# Patient Record
Sex: MALE | Race: ASIAN | NOT HISPANIC OR LATINO | URBAN - METROPOLITAN AREA
[De-identification: names, ages, dates, MRNs, and addresses within clinical notes are randomized per-mention and may not be internally consistent; named-entity substitution may affect disease eponyms.]

---

## 2022-02-19 ENCOUNTER — INPATIENT (INPATIENT)
Facility: HOSPITAL | Age: 72
LOS: 4 days | Discharge: HOME | DRG: 637 | End: 2022-02-24
Attending: INTERNAL MEDICINE | Admitting: STUDENT IN AN ORGANIZED HEALTH CARE EDUCATION/TRAINING PROGRAM
Payer: MEDICARE

## 2022-02-19 VITALS
RESPIRATION RATE: 20 BRPM | HEIGHT: 67 IN | HEART RATE: 91 BPM | SYSTOLIC BLOOD PRESSURE: 155 MMHG | TEMPERATURE: 98 F | DIASTOLIC BLOOD PRESSURE: 96 MMHG | WEIGHT: 145.06 LBS | OXYGEN SATURATION: 99 %

## 2022-02-19 LAB
ALBUMIN SERPL ELPH-MCNC: 4.3 G/DL — SIGNIFICANT CHANGE UP (ref 3.3–5)
ALP SERPL-CCNC: 88 U/L — SIGNIFICANT CHANGE UP (ref 40–120)
ALT FLD-CCNC: 20 U/L — SIGNIFICANT CHANGE UP (ref 10–45)
ANION GAP SERPL CALC-SCNC: 13 MMOL/L — SIGNIFICANT CHANGE UP (ref 5–17)
ANION GAP SERPL CALC-SCNC: 14 MMOL/L — SIGNIFICANT CHANGE UP (ref 5–17)
APPEARANCE UR: CLEAR — SIGNIFICANT CHANGE UP
APTT BLD: 43.1 SEC — HIGH (ref 27.5–35.5)
AST SERPL-CCNC: 18 U/L — SIGNIFICANT CHANGE UP (ref 10–40)
B-OH-BUTYR SERPL-SCNC: 0.8 MMOL/L — HIGH
B-OH-BUTYR SERPL-SCNC: 0.9 MMOL/L — HIGH
BASE EXCESS BLDV CALC-SCNC: 7.2 MMOL/L — HIGH (ref -2–2)
BASOPHILS # BLD AUTO: 0.05 K/UL — SIGNIFICANT CHANGE UP (ref 0–0.2)
BASOPHILS NFR BLD AUTO: 0.6 % — SIGNIFICANT CHANGE UP (ref 0–2)
BILIRUB SERPL-MCNC: 0.9 MG/DL — SIGNIFICANT CHANGE UP (ref 0.2–1.2)
BILIRUB UR-MCNC: NEGATIVE — SIGNIFICANT CHANGE UP
BUN SERPL-MCNC: 14 MG/DL — SIGNIFICANT CHANGE UP (ref 7–23)
BUN SERPL-MCNC: 14 MG/DL — SIGNIFICANT CHANGE UP (ref 7–23)
CA-I SERPL-SCNC: 1.2 MMOL/L — SIGNIFICANT CHANGE UP (ref 1.15–1.33)
CALCIUM SERPL-MCNC: 9.2 MG/DL — SIGNIFICANT CHANGE UP (ref 8.4–10.5)
CALCIUM SERPL-MCNC: 9.9 MG/DL — SIGNIFICANT CHANGE UP (ref 8.4–10.5)
CHLORIDE BLDV-SCNC: 107 MMOL/L — SIGNIFICANT CHANGE UP (ref 96–108)
CHLORIDE SERPL-SCNC: 108 MMOL/L — SIGNIFICANT CHANGE UP (ref 96–108)
CHLORIDE SERPL-SCNC: 110 MMOL/L — HIGH (ref 96–108)
CO2 BLDV-SCNC: 34 MMOL/L — HIGH (ref 22–26)
CO2 SERPL-SCNC: 26 MMOL/L — SIGNIFICANT CHANGE UP (ref 22–31)
CO2 SERPL-SCNC: 27 MMOL/L — SIGNIFICANT CHANGE UP (ref 22–31)
COLOR SPEC: SIGNIFICANT CHANGE UP
CREAT SERPL-MCNC: 0.99 MG/DL — SIGNIFICANT CHANGE UP (ref 0.5–1.3)
CREAT SERPL-MCNC: 1.14 MG/DL — SIGNIFICANT CHANGE UP (ref 0.5–1.3)
DIFF PNL FLD: NEGATIVE — SIGNIFICANT CHANGE UP
EOSINOPHIL # BLD AUTO: 0.06 K/UL — SIGNIFICANT CHANGE UP (ref 0–0.5)
EOSINOPHIL NFR BLD AUTO: 0.8 % — SIGNIFICANT CHANGE UP (ref 0–6)
GAS PNL BLDV: 145 MMOL/L — SIGNIFICANT CHANGE UP (ref 136–145)
GAS PNL BLDV: SIGNIFICANT CHANGE UP
GAS PNL BLDV: SIGNIFICANT CHANGE UP
GLUCOSE BLDV-MCNC: 239 MG/DL — HIGH (ref 70–99)
GLUCOSE SERPL-MCNC: 232 MG/DL — HIGH (ref 70–99)
GLUCOSE SERPL-MCNC: 234 MG/DL — HIGH (ref 70–99)
GLUCOSE UR QL: ABNORMAL
HCO3 BLDV-SCNC: 33 MMOL/L — HIGH (ref 22–29)
HCT VFR BLD CALC: 51.8 % — HIGH (ref 39–50)
HCT VFR BLDA CALC: 51 % — SIGNIFICANT CHANGE UP (ref 39–51)
HGB BLD CALC-MCNC: 16.9 G/DL — SIGNIFICANT CHANGE UP (ref 12.6–17.4)
HGB BLD-MCNC: 16.5 G/DL — SIGNIFICANT CHANGE UP (ref 13–17)
IMM GRANULOCYTES NFR BLD AUTO: 0.4 % — SIGNIFICANT CHANGE UP (ref 0–1.5)
INR BLD: 1.05 RATIO — SIGNIFICANT CHANGE UP (ref 0.88–1.16)
KETONES UR-MCNC: ABNORMAL
LACTATE BLDV-MCNC: 1.6 MMOL/L — SIGNIFICANT CHANGE UP (ref 0.7–2)
LEUKOCYTE ESTERASE UR-ACNC: NEGATIVE — SIGNIFICANT CHANGE UP
LYMPHOCYTES # BLD AUTO: 2.05 K/UL — SIGNIFICANT CHANGE UP (ref 1–3.3)
LYMPHOCYTES # BLD AUTO: 25.9 % — SIGNIFICANT CHANGE UP (ref 13–44)
MAGNESIUM SERPL-MCNC: 2.2 MG/DL — SIGNIFICANT CHANGE UP (ref 1.6–2.6)
MCHC RBC-ENTMCNC: 28.3 PG — SIGNIFICANT CHANGE UP (ref 27–34)
MCHC RBC-ENTMCNC: 31.9 GM/DL — LOW (ref 32–36)
MCV RBC AUTO: 88.7 FL — SIGNIFICANT CHANGE UP (ref 80–100)
MONOCYTES # BLD AUTO: 0.62 K/UL — SIGNIFICANT CHANGE UP (ref 0–0.9)
MONOCYTES NFR BLD AUTO: 7.8 % — SIGNIFICANT CHANGE UP (ref 2–14)
NEUTROPHILS # BLD AUTO: 5.12 K/UL — SIGNIFICANT CHANGE UP (ref 1.8–7.4)
NEUTROPHILS NFR BLD AUTO: 64.5 % — SIGNIFICANT CHANGE UP (ref 43–77)
NITRITE UR-MCNC: NEGATIVE — SIGNIFICANT CHANGE UP
NRBC # BLD: 0 /100 WBCS — SIGNIFICANT CHANGE UP (ref 0–0)
PCO2 BLDV: 47 MMHG — SIGNIFICANT CHANGE UP (ref 42–55)
PH BLDV: 7.45 — HIGH (ref 7.32–7.43)
PH UR: 6 — SIGNIFICANT CHANGE UP (ref 5–8)
PHOSPHATE SERPL-MCNC: 3.2 MG/DL — SIGNIFICANT CHANGE UP (ref 2.5–4.5)
PLATELET # BLD AUTO: 201 K/UL — SIGNIFICANT CHANGE UP (ref 150–400)
PO2 BLDV: 32 MMHG — SIGNIFICANT CHANGE UP (ref 25–45)
POTASSIUM BLDV-SCNC: 3.2 MMOL/L — LOW (ref 3.5–5.1)
POTASSIUM SERPL-MCNC: 2.9 MMOL/L — CRITICAL LOW (ref 3.5–5.3)
POTASSIUM SERPL-MCNC: 3.4 MMOL/L — LOW (ref 3.5–5.3)
POTASSIUM SERPL-SCNC: 2.9 MMOL/L — CRITICAL LOW (ref 3.5–5.3)
POTASSIUM SERPL-SCNC: 3.4 MMOL/L — LOW (ref 3.5–5.3)
PROT SERPL-MCNC: 7.7 G/DL — SIGNIFICANT CHANGE UP (ref 6–8.3)
PROT UR-MCNC: NEGATIVE — SIGNIFICANT CHANGE UP
PROTHROM AB SERPL-ACNC: 12.6 SEC — SIGNIFICANT CHANGE UP (ref 10.6–13.6)
RBC # BLD: 5.84 M/UL — HIGH (ref 4.2–5.8)
RBC # FLD: 12.7 % — SIGNIFICANT CHANGE UP (ref 10.3–14.5)
SAO2 % BLDV: 56.9 % — LOW (ref 67–88)
SODIUM SERPL-SCNC: 149 MMOL/L — HIGH (ref 135–145)
SODIUM SERPL-SCNC: 149 MMOL/L — HIGH (ref 135–145)
SP GR SPEC: 1.01 — SIGNIFICANT CHANGE UP (ref 1.01–1.02)
TROPONIN T, HIGH SENSITIVITY RESULT: 19 NG/L — SIGNIFICANT CHANGE UP (ref 0–51)
TROPONIN T, HIGH SENSITIVITY RESULT: 19 NG/L — SIGNIFICANT CHANGE UP (ref 0–51)
UROBILINOGEN FLD QL: NEGATIVE — SIGNIFICANT CHANGE UP
WBC # BLD: 7.93 K/UL — SIGNIFICANT CHANGE UP (ref 3.8–10.5)
WBC # FLD AUTO: 7.93 K/UL — SIGNIFICANT CHANGE UP (ref 3.8–10.5)

## 2022-02-19 PROCEDURE — 93010 ELECTROCARDIOGRAM REPORT: CPT

## 2022-02-19 PROCEDURE — 99285 EMERGENCY DEPT VISIT HI MDM: CPT

## 2022-02-19 PROCEDURE — 71045 X-RAY EXAM CHEST 1 VIEW: CPT | Mod: 26

## 2022-02-19 RX ORDER — SODIUM CHLORIDE 9 MG/ML
1000 INJECTION, SOLUTION INTRAVENOUS ONCE
Refills: 0 | Status: COMPLETED | OUTPATIENT
Start: 2022-02-19 | End: 2022-02-19

## 2022-02-19 RX ADMIN — SODIUM CHLORIDE 1000 MILLILITER(S): 9 INJECTION, SOLUTION INTRAVENOUS at 21:18

## 2022-02-19 NOTE — ED PROVIDER NOTE - ATTENDING CONTRIBUTION TO CARE
I performed a history and physical exam of the patient and discussed their management with the resident and /or advanced care provider. I reviewed the resident and /or ACP's note and agree with the documented findings and plan of care except where otherwise noted. My medical decision making and observations are found below     72 yo M no PMH with 1 week lethargy, weakness, fatugue, polyuria, polydipsia. Went to PMD who diagnosed with htn and DM. Rx'd him with medication which he verdugo snot know name of and has not started yet. Symptoms worsened today so sent here.  . No abdominal pain, no fever, no chest pain, no sob. Endorses chills, transient blurred vision yesterday and chronic b/l LE paresthesias. COVID vaccinated    ddx includes but not limited to new onset DM, less likley HHS vs DKA. will eval for underlying ischemic. infectious or metabolic etiology, although low concern based on history. Labs, xray, UA, IVF to help decrease sugar and reassess for dispo I performed a history and physical exam of the patient and discussed their management with the resident and /or advanced care provider. I reviewed the resident and /or ACP's note and agree with the documented findings and plan of care except where otherwise noted. My medical decision making and observations are found below     72 yo M no PMH with 1 week lethargy, weakness, fatugue, polyuria, polydipsia. Went to PMD who diagnosed with htn and DM. Rx'd him with medication which he verdugo snot know name of and has not started yet. Symptoms worsened today so sent here.  . No abdominal pain, no fever, no chest pain, no sob. Endorses chills, transient blurred vision yesterday and chronic b/l LE paresthesias. COVID vaccinated    Exam nonfocal. hemodynamically stable. awake, alert, ocnversant. respirations nonlabored. abdomen soft, nontender    ddx includes but not limited to new onset DM, less likley HHS vs DKA. will eval for underlying ischemic. infectious or metabolic etiology, although low concern based on history. Labs, xray, UA, IVF to help decrease sugar and reassess for dispo    EKG: NSR 64, otherwise normal intervals, no overt ischemic changes Home

## 2022-02-19 NOTE — ED PROVIDER NOTE - WR ORDER ID 1
[id number] Star Wedge Flap Text: The defect edges were debeveled with a #15 scalpel blade.  Given the location of the defect, shape of the defect and the proximity to free margins a star wedge flap was deemed most appropriate.  Using a sterile surgical marker, an appropriate rotation flap was drawn incorporating the defect and placing the expected incisions within the relaxed skin tension lines where possible. The area thus outlined was incised deep to adipose tissue with a #15 scalpel blade.  The skin margins were undermined to an appropriate distance in all directions utilizing iris scissors.

## 2022-02-19 NOTE — ED ADULT NURSE NOTE - OBJECTIVE STATEMENT
70 yo male presents to ED c/o lethargy, weakness, polyuria, and polydipsia x 1 week. Pt reports he went to PMD regarding symptoms and was told he has DM and HTN, but reports no medication started yet. Pt also endorsing b/l "shocking tingles" to LE, chills, transient blurred vision, and decreased "taste." Pt reports symptoms worsened today so he came for further evaluation. Pt denies cp, sob, palpitations, h/a, dizziness, lightheadedness, difficulty ambulating, abdominal pain, n/v/d, burning/pain with urination, fevers. Upon assessment, pt a&ox3, nad, breathing spontaneous and nonlabored, able to move all extremities and follow commands, skin warm and appropriate color, neuro intact. Pt NSR on CM. MD at bedside. Pt safety and comfort provided.

## 2022-02-19 NOTE — ED PROVIDER NOTE - CLINICAL SUMMARY MEDICAL DECISION MAKING FREE TEXT BOX
72 yo M new dx of DM has not taken meds p/w 3 days of polyuria polydipsia weakness and fatigue. VSS.  at triage. a+o x3. no abd pain. Concern for hyperglycemia. lower concern for hhs dka infection. Will get labs ua coags vbg trop bhb ekg covid give ivf and r/a

## 2022-02-19 NOTE — ED PROVIDER NOTE - CARE PLAN
Principal Discharge DX:	Hypokalemia   1 Principal Discharge DX:	Hypokalemia  Secondary Diagnosis:	Hyperglycemia due to diabetes mellitus

## 2022-02-19 NOTE — ED PROVIDER NOTE - OBJECTIVE STATEMENT
70 yo M no PMH with 1 week lethargy, weakness, fatigue, polyuria, polydipsia. Went to PMD who diagnosed with htn and DM. Rx'd him with medication which  not started yet. Symptoms worsened today so sent here.  . No abdominal pain, no fever, no chest pain, no sob no dysuria. Endorses chills, transient blurred vision yesterday and chronic b/l LE paresthesias. COVID vaccinated no sick contacts. spoke to daughter who corroborated story.

## 2022-02-20 DIAGNOSIS — E78.5 HYPERLIPIDEMIA, UNSPECIFIED: ICD-10-CM

## 2022-02-20 DIAGNOSIS — E87.6 HYPOKALEMIA: ICD-10-CM

## 2022-02-20 DIAGNOSIS — E11.9 TYPE 2 DIABETES MELLITUS WITHOUT COMPLICATIONS: ICD-10-CM

## 2022-02-20 DIAGNOSIS — I10 ESSENTIAL (PRIMARY) HYPERTENSION: ICD-10-CM

## 2022-02-20 LAB
ANION GAP SERPL CALC-SCNC: 15 MMOL/L — SIGNIFICANT CHANGE UP (ref 5–17)
ANION GAP SERPL CALC-SCNC: 15 MMOL/L — SIGNIFICANT CHANGE UP (ref 5–17)
BUN SERPL-MCNC: 14 MG/DL — SIGNIFICANT CHANGE UP (ref 7–23)
BUN SERPL-MCNC: 14 MG/DL — SIGNIFICANT CHANGE UP (ref 7–23)
CALCIUM SERPL-MCNC: 9.1 MG/DL — SIGNIFICANT CHANGE UP (ref 8.4–10.5)
CALCIUM SERPL-MCNC: 9.3 MG/DL — SIGNIFICANT CHANGE UP (ref 8.4–10.5)
CHLORIDE SERPL-SCNC: 109 MMOL/L — HIGH (ref 96–108)
CHLORIDE SERPL-SCNC: 113 MMOL/L — HIGH (ref 96–108)
CO2 SERPL-SCNC: 23 MMOL/L — SIGNIFICANT CHANGE UP (ref 22–31)
CO2 SERPL-SCNC: 26 MMOL/L — SIGNIFICANT CHANGE UP (ref 22–31)
CREAT SERPL-MCNC: 1.01 MG/DL — SIGNIFICANT CHANGE UP (ref 0.5–1.3)
CREAT SERPL-MCNC: 1.07 MG/DL — SIGNIFICANT CHANGE UP (ref 0.5–1.3)
GLUCOSE BLDC GLUCOMTR-MCNC: 141 MG/DL — HIGH (ref 70–99)
GLUCOSE BLDC GLUCOMTR-MCNC: 200 MG/DL — HIGH (ref 70–99)
GLUCOSE BLDC GLUCOMTR-MCNC: 240 MG/DL — HIGH (ref 70–99)
GLUCOSE SERPL-MCNC: 222 MG/DL — HIGH (ref 70–99)
GLUCOSE SERPL-MCNC: 236 MG/DL — HIGH (ref 70–99)
HCT VFR BLD CALC: 46.7 % — SIGNIFICANT CHANGE UP (ref 39–50)
HGB BLD-MCNC: 15.3 G/DL — SIGNIFICANT CHANGE UP (ref 13–17)
MAGNESIUM SERPL-MCNC: 2.1 MG/DL — SIGNIFICANT CHANGE UP (ref 1.6–2.6)
MCHC RBC-ENTMCNC: 28.8 PG — SIGNIFICANT CHANGE UP (ref 27–34)
MCHC RBC-ENTMCNC: 32.8 GM/DL — SIGNIFICANT CHANGE UP (ref 32–36)
MCV RBC AUTO: 87.9 FL — SIGNIFICANT CHANGE UP (ref 80–100)
NRBC # BLD: 0 /100 WBCS — SIGNIFICANT CHANGE UP (ref 0–0)
PHOSPHATE SERPL-MCNC: 2.8 MG/DL — SIGNIFICANT CHANGE UP (ref 2.5–4.5)
PLATELET # BLD AUTO: 183 K/UL — SIGNIFICANT CHANGE UP (ref 150–400)
POTASSIUM SERPL-MCNC: 3 MMOL/L — LOW (ref 3.5–5.3)
POTASSIUM SERPL-MCNC: 3.6 MMOL/L — SIGNIFICANT CHANGE UP (ref 3.5–5.3)
POTASSIUM SERPL-SCNC: 3 MMOL/L — LOW (ref 3.5–5.3)
POTASSIUM SERPL-SCNC: 3.6 MMOL/L — SIGNIFICANT CHANGE UP (ref 3.5–5.3)
RBC # BLD: 5.31 M/UL — SIGNIFICANT CHANGE UP (ref 4.2–5.8)
RBC # FLD: 12.6 % — SIGNIFICANT CHANGE UP (ref 10.3–14.5)
SARS-COV-2 RNA SPEC QL NAA+PROBE: SIGNIFICANT CHANGE UP
SODIUM SERPL-SCNC: 150 MMOL/L — HIGH (ref 135–145)
SODIUM SERPL-SCNC: 151 MMOL/L — HIGH (ref 135–145)
WBC # BLD: 7.84 K/UL — SIGNIFICANT CHANGE UP (ref 3.8–10.5)
WBC # FLD AUTO: 7.84 K/UL — SIGNIFICANT CHANGE UP (ref 3.8–10.5)

## 2022-02-20 RX ORDER — HYDRALAZINE HCL 50 MG
5 TABLET ORAL ONCE
Refills: 0 | Status: COMPLETED | OUTPATIENT
Start: 2022-02-20 | End: 2022-02-20

## 2022-02-20 RX ORDER — DEXTROSE 50 % IN WATER 50 %
15 SYRINGE (ML) INTRAVENOUS ONCE
Refills: 0 | Status: DISCONTINUED | OUTPATIENT
Start: 2022-02-20 | End: 2022-02-24

## 2022-02-20 RX ORDER — ASPIRIN/CALCIUM CARB/MAGNESIUM 324 MG
81 TABLET ORAL DAILY
Refills: 0 | Status: DISCONTINUED | OUTPATIENT
Start: 2022-02-20 | End: 2022-02-24

## 2022-02-20 RX ORDER — DEXTROSE 50 % IN WATER 50 %
12.5 SYRINGE (ML) INTRAVENOUS ONCE
Refills: 0 | Status: DISCONTINUED | OUTPATIENT
Start: 2022-02-20 | End: 2022-02-24

## 2022-02-20 RX ORDER — SODIUM CHLORIDE 9 MG/ML
1000 INJECTION, SOLUTION INTRAVENOUS
Refills: 0 | Status: DISCONTINUED | OUTPATIENT
Start: 2022-02-20 | End: 2022-02-20

## 2022-02-20 RX ORDER — DEXTROSE 50 % IN WATER 50 %
25 SYRINGE (ML) INTRAVENOUS ONCE
Refills: 0 | Status: DISCONTINUED | OUTPATIENT
Start: 2022-02-20 | End: 2022-02-24

## 2022-02-20 RX ORDER — INSULIN LISPRO 100/ML
4 VIAL (ML) SUBCUTANEOUS
Refills: 0 | Status: DISCONTINUED | OUTPATIENT
Start: 2022-02-20 | End: 2022-02-24

## 2022-02-20 RX ORDER — INSULIN GLARGINE 100 [IU]/ML
14 INJECTION, SOLUTION SUBCUTANEOUS AT BEDTIME
Refills: 0 | Status: DISCONTINUED | OUTPATIENT
Start: 2022-02-20 | End: 2022-02-21

## 2022-02-20 RX ORDER — LOSARTAN POTASSIUM 100 MG/1
25 TABLET, FILM COATED ORAL EVERY 12 HOURS
Refills: 0 | Status: DISCONTINUED | OUTPATIENT
Start: 2022-02-20 | End: 2022-02-21

## 2022-02-20 RX ORDER — HEPARIN SODIUM 5000 [USP'U]/ML
5000 INJECTION INTRAVENOUS; SUBCUTANEOUS EVERY 12 HOURS
Refills: 0 | Status: DISCONTINUED | OUTPATIENT
Start: 2022-02-20 | End: 2022-02-24

## 2022-02-20 RX ORDER — POTASSIUM CHLORIDE 20 MEQ
40 PACKET (EA) ORAL ONCE
Refills: 0 | Status: COMPLETED | OUTPATIENT
Start: 2022-02-20 | End: 2022-02-20

## 2022-02-20 RX ORDER — ATORVASTATIN CALCIUM 80 MG/1
10 TABLET, FILM COATED ORAL AT BEDTIME
Refills: 0 | Status: DISCONTINUED | OUTPATIENT
Start: 2022-02-20 | End: 2022-02-24

## 2022-02-20 RX ORDER — INSULIN LISPRO 100/ML
VIAL (ML) SUBCUTANEOUS
Refills: 0 | Status: DISCONTINUED | OUTPATIENT
Start: 2022-02-20 | End: 2022-02-24

## 2022-02-20 RX ORDER — HYDRALAZINE HCL 50 MG
25 TABLET ORAL ONCE
Refills: 0 | Status: COMPLETED | OUTPATIENT
Start: 2022-02-20 | End: 2022-02-20

## 2022-02-20 RX ORDER — SODIUM CHLORIDE 9 MG/ML
1000 INJECTION, SOLUTION INTRAVENOUS
Refills: 0 | Status: DISCONTINUED | OUTPATIENT
Start: 2022-02-20 | End: 2022-02-24

## 2022-02-20 RX ORDER — GLUCAGON INJECTION, SOLUTION 0.5 MG/.1ML
1 INJECTION, SOLUTION SUBCUTANEOUS ONCE
Refills: 0 | Status: DISCONTINUED | OUTPATIENT
Start: 2022-02-20 | End: 2022-02-24

## 2022-02-20 RX ORDER — INSULIN LISPRO 100/ML
VIAL (ML) SUBCUTANEOUS AT BEDTIME
Refills: 0 | Status: DISCONTINUED | OUTPATIENT
Start: 2022-02-20 | End: 2022-02-24

## 2022-02-20 RX ORDER — POTASSIUM CHLORIDE 20 MEQ
10 PACKET (EA) ORAL
Refills: 0 | Status: COMPLETED | OUTPATIENT
Start: 2022-02-20 | End: 2022-02-20

## 2022-02-20 RX ORDER — INSULIN GLARGINE 100 [IU]/ML
8 INJECTION, SOLUTION SUBCUTANEOUS AT BEDTIME
Refills: 0 | Status: DISCONTINUED | OUTPATIENT
Start: 2022-02-20 | End: 2022-02-20

## 2022-02-20 RX ORDER — SODIUM CHLORIDE 9 MG/ML
1000 INJECTION, SOLUTION INTRAVENOUS
Refills: 0 | Status: DISCONTINUED | OUTPATIENT
Start: 2022-02-20 | End: 2022-02-23

## 2022-02-20 RX ADMIN — Medication 100 MILLIEQUIVALENT(S): at 03:02

## 2022-02-20 RX ADMIN — Medication 25 MILLIGRAM(S): at 09:11

## 2022-02-20 RX ADMIN — Medication 100 MILLIEQUIVALENT(S): at 01:55

## 2022-02-20 RX ADMIN — SODIUM CHLORIDE 70 MILLILITER(S): 9 INJECTION, SOLUTION INTRAVENOUS at 13:20

## 2022-02-20 RX ADMIN — Medication 100 MILLIEQUIVALENT(S): at 04:14

## 2022-02-20 RX ADMIN — ATORVASTATIN CALCIUM 10 MILLIGRAM(S): 80 TABLET, FILM COATED ORAL at 21:47

## 2022-02-20 RX ADMIN — Medication 2: at 10:23

## 2022-02-20 RX ADMIN — Medication 4 UNIT(S): at 17:13

## 2022-02-20 RX ADMIN — SODIUM CHLORIDE 100 MILLILITER(S): 9 INJECTION, SOLUTION INTRAVENOUS at 06:19

## 2022-02-20 RX ADMIN — Medication 1: at 17:13

## 2022-02-20 RX ADMIN — INSULIN GLARGINE 14 UNIT(S): 100 INJECTION, SOLUTION SUBCUTANEOUS at 21:48

## 2022-02-20 RX ADMIN — LOSARTAN POTASSIUM 25 MILLIGRAM(S): 100 TABLET, FILM COATED ORAL at 17:12

## 2022-02-20 RX ADMIN — Medication 81 MILLIGRAM(S): at 13:21

## 2022-02-20 RX ADMIN — HEPARIN SODIUM 5000 UNIT(S): 5000 INJECTION INTRAVENOUS; SUBCUTANEOUS at 17:14

## 2022-02-20 RX ADMIN — Medication 40 MILLIEQUIVALENT(S): at 01:55

## 2022-02-20 RX ADMIN — Medication 5 MILLIGRAM(S): at 15:14

## 2022-02-20 NOTE — H&P ADULT - NSHPPHYSICALEXAM_GEN_ALL_CORE
PHYSICAL EXAMINATION:  Vital Signs Last 24 Hrs  T(C): 36.6 (20 Feb 2022 08:53), Max: 36.7 (19 Feb 2022 16:43)  T(F): 97.8 (20 Feb 2022 08:53), Max: 98.1 (19 Feb 2022 16:43)  HR: 63 (20 Feb 2022 09:46) (61 - 91)  BP: 155/84 (20 Feb 2022 09:46) (150/88 - 171/94)  BP(mean): 112 (20 Feb 2022 08:53) (112 - 112)  RR: 18 (20 Feb 2022 08:53) (14 - 20)  SpO2: 100% (20 Feb 2022 08:53) (98% - 100%)  CAPILLARY BLOOD GLUCOSE      POCT Blood Glucose.: 240 mg/dL (20 Feb 2022 10:17)  POCT Blood Glucose.: 226 mg/dL (19 Feb 2022 16:44)        GENERAL: NAD, well-groomed,  HEAD:  atraumatic, normocephalic  EYES: sclera anicteric  ENMT: mucous membranes moist  NECK: supple, No JVD  CHEST/LUNG: clear to auscultation bilaterally;    no      rales   ,   no rhonchi,   HEART: normal S1, S2  ABDOMEN: BS+, soft, ND, NT   EXTREMITIES:    no    edema    b/l LEs  NEURO: awake, ,     moves all extremities  SKIN: no     rash

## 2022-02-20 NOTE — H&P ADULT - ASSESSMENT
70 yo M   no PMH with 1 week lethargy, weakness, fatigue, polyuria, polydipsia.   Went to PMD who diagnosed with htn and DM. Rx'd him with medication which  not started yet.   Symptoms worsened today so sent here.  .   No abdominal pain, no fever, no chest pain, no sob no dysuria.   Endorses chills, transient blurred vision yesterday and chronic b/l LE paresthesias.   COVID vaccinated no sick contacts.      pt with weakness,  polyuria  DM,  follow  fs  on lantus/    endo d r hkan   on iv fluids on  dvt ppx  echo pending             Go for blood tests as directed. Your doctor will do lab tests at regular visits to monitor the effects of this medicine. Please follow up with your doctor and keep your health care provider appointments.

## 2022-02-20 NOTE — CONSULT NOTE ADULT - SUBJECTIVE AND OBJECTIVE BOX
CHIEF COMPLAINT:Patient is a 71y old  Male who presents with a chief complaint of weakness (20 Feb 2022 12:14)      HPI:  72 yo M no PMH with 1 week lethargy, weakness, fatigue, polyuria, polydipsia.Went to PMD who diagnosed with htn and DM. Rx'd him with medication which  not started yet.   Symptoms worsened today so sent here.  .   No abdominal pain, no fever, no chest pain, no sob no dysuria. Endorses chills, transient blurred vision yesterday and chronic b/l LE paresthesias.         PAST MEDICAL & SURGICAL HISTORY:      MEDICATIONS  (STANDING):  aspirin enteric coated 81 milliGRAM(s) Oral daily  dextrose 40% Gel 15 Gram(s) Oral once  dextrose 5% + sodium chloride 0.45%. 1000 milliLiter(s) (70 mL/Hr) IV Continuous <Continuous>  dextrose 5%. 1000 milliLiter(s) (100 mL/Hr) IV Continuous <Continuous>  dextrose 5%. 1000 milliLiter(s) (50 mL/Hr) IV Continuous <Continuous>  dextrose 50% Injectable 25 Gram(s) IV Push once  dextrose 50% Injectable 12.5 Gram(s) IV Push once  dextrose 50% Injectable 25 Gram(s) IV Push once  glucagon  Injectable 1 milliGRAM(s) IntraMuscular once  heparin   Injectable 5000 Unit(s) SubCutaneous every 12 hours  insulin glargine Injectable (LANTUS) 8 Unit(s) SubCutaneous at bedtime  insulin lispro (ADMELOG) corrective regimen sliding scale   SubCutaneous three times a day before meals  insulin lispro (ADMELOG) corrective regimen sliding scale   SubCutaneous at bedtime    MEDICATIONS  (PRN):      FAMILY HISTORY:      SOCIAL HISTORY:    [x ] Non-smoker  [ ] Smoker  [ ] Alcohol    Allergies    No Known Allergies    Intolerances    	    REVIEW OF SYSTEMS:  CONSTITUTIONAL: No fever, weight loss, or fatigue  EYES: No eye pain, visual disturbances, or discharge, +blurry vision  ENT:  No difficulty hearing, tinnitus, vertigo; No sinus or throat pain  NECK: No pain or stiffness  RESPIRATORY: No cough, wheezing, chills or hemoptysis; No Shortness of Breath  CARDIOVASCULAR: No chest pain, palpitations, passing out, dizziness, or leg swelling  GASTROINTESTINAL: No abdominal or epigastric pain. No nausea, vomiting, or hematemesis; No diarrhea or constipation. No melena or hematochezia.  GENITOURINARY: No dysuria, frequency, hematuria, or incontinence  NEUROLOGICAL: No headaches, memory loss, loss of strength, numbness, or tremors  SKIN: No itching, burning, rashes, or lesions   LYMPH Nodes: No enlarged glands  ENDOCRINE: No heat or cold intolerance; No hair loss  MUSCULOSKELETAL: No joint pain or swelling; No muscle, back, or extremity pain  PSYCHIATRIC: No depression, anxiety, mood swings, or difficulty sleeping  HEME/LYMPH: No easy bruising, or bleeding gums  ALLERGY AND IMMUNOLOGIC: No hives or eczema	    [ ] All others negative	  [ ] Unable to obtain    PHYSICAL EXAM:  T(C): 36.6 (02-20-22 @ 08:53), Max: 36.7 (02-19-22 @ 16:43)  HR: 63 (02-20-22 @ 09:46) (61 - 91)  BP: 155/84 (02-20-22 @ 09:46) (150/88 - 171/94)  RR: 18 (02-20-22 @ 08:53) (14 - 20)  SpO2: 100% (02-20-22 @ 08:53) (98% - 100%)  Wt(kg): --  I&O's Summary      Appearance: Normal	  HEENT:   Normal oral mucosa, PERRL, EOMI	  Lymphatic: No lymphadenopathy  Cardiovascular: Normal S1 S2, No JVD, + murmurs, No edema  Respiratory: Lungs clear to auscultation	  Psychiatry: A & O x 3, Mood & affect appropriate  Gastrointestinal:  Soft, Non-tender, + BS	  Skin: No rashes, No ecchymoses, No cyanosis	  Neurologic: Non-focal  Extremities: Normal range of motion, No clubbing, cyanosis or edema  Vascular: Peripheral pulses palpable 2+ bilaterally    TELEMETRY: 	    ECG:  	  RADIOLOGY:  OTHER: 	  	  LABS:	 	    CARDIAC MARKERS:                              15.3   7.84  )-----------( 183      ( 20 Feb 2022 05:11 )             46.7     02-20    151<H>  |  113<H>  |  14  ----------------------------<  222<H>  3.6   |  23  |  1.07    Ca    9.1      20 Feb 2022 05:11  Phos  2.8     02-20  Mg     2.1     02-20    TPro  7.7  /  Alb  4.3  /  TBili  0.9  /  DBili  x   /  AST  18  /  ALT  20  /  AlkPhos  88  02-19    proBNP:   Lipid Profile:   HgA1c:   TSH:   PT/INR - ( 19 Feb 2022 20:44 )   PT: 12.6 sec;   INR: 1.05 ratio         PTT - ( 19 Feb 2022 20:44 )  PTT:43.1 sec    PREVIOUS DIAGNOSTIC TESTING:    < from: Xray Chest 1 View AP/PA (02.19.22 @ 21:47) >  The lungs are clear.  No pleural effusion or pneumothorax.  Heart size is within normal limits.  No acute abnormality within visible osseous structures.      IMPRESSION:    Clear lungs.    Troponin T, High Sensitivity (02.19.22 @ 21:40)    Troponin T, High Sensitivity Result: 19: Specimen not hemolyzed  *  *  Rapid upward or downward changes in high-sensitivity troponin levels  suggest acute myocardial injury. Renal impairment may cause sustained  troponin elevations.  Normal: <6 - 14 ng/L  Indeterminate: 15-51 ng/L  Elevated: > 51 ng/L  See [email or web address]/[email or web address]/[email or web address] on the [facility] for more  information ng/L    Troponin T, High Sensitivity (02.19.22 @ 20:44)    Troponin T, High Sensitivity Result: 19: Specimen not hemolyzed  *  *  Rapid upward or downward changes in high-sensitivity troponin levels  suggest acute myocardial injury. Renal impairment may cause sustained  troponin elevations.  Normal: <6 - 14 ng/L  Indeterminate: 15-51 ng/L  Elevated: > 51 ng/L  See [email or web address] on the [facility] [place] for more  information ng/L            
      HPI:  72 yo M   no PMH with 1 week lethargy, weakness, fatigue, polyuria, polydipsia.   Went to PMD who diagnosed with htn and DM. Rx'd him with medication which  not started yet.   Symptoms worsened today so sent here.  .   No abdominal pain, no fever, no chest pain, no sob no dysuria. Endorses chills, transient blurred vision yesterday and chronic b/l LE paresthesias.   COVID vaccinated no sick contacts. (20 Feb 2022 12:14)    Patient with newly diagnosed diabetes, A1C pending, was not taking any hypoglycemic agents, had polyuria polydipsia. Patient follows up with PCP for health management.  Endo was consulted for glycemic control.    PAST MEDICAL & SURGICAL HISTORY:      FAMILY HISTORY:      Social History:    Outpatient Medications:    MEDICATIONS  (STANDING):  aspirin enteric coated 81 milliGRAM(s) Oral daily  atorvastatin 10 milliGRAM(s) Oral at bedtime  dextrose 40% Gel 15 Gram(s) Oral once  dextrose 5% + sodium chloride 0.45%. 1000 milliLiter(s) (70 mL/Hr) IV Continuous <Continuous>  dextrose 5%. 1000 milliLiter(s) (100 mL/Hr) IV Continuous <Continuous>  dextrose 5%. 1000 milliLiter(s) (50 mL/Hr) IV Continuous <Continuous>  dextrose 50% Injectable 25 Gram(s) IV Push once  dextrose 50% Injectable 12.5 Gram(s) IV Push once  dextrose 50% Injectable 25 Gram(s) IV Push once  glucagon  Injectable 1 milliGRAM(s) IntraMuscular once  heparin   Injectable 5000 Unit(s) SubCutaneous every 12 hours  insulin glargine Injectable (LANTUS) 8 Unit(s) SubCutaneous at bedtime  insulin lispro (ADMELOG) corrective regimen sliding scale   SubCutaneous three times a day before meals  insulin lispro (ADMELOG) corrective regimen sliding scale   SubCutaneous at bedtime  losartan 25 milliGRAM(s) Oral every 12 hours    MEDICATIONS  (PRN):      Allergies    No Known Allergies    Intolerances      Review of Systems:  Constitutional: No fever, no chills  Eyes: No blurry vision  Neuro: No tremors  HEENT: No pain, no neck swelling  Cardiovascular: No chest pain, no palpitations  Respiratory: Has SOB, no cough  GI: No nausea, vomiting, abdominal pain  : No dysuria  Skin: no rash  MSK: Has leg swelling.  Psych: no depression  Endocrine: no polyuria, polydipsia    ALL OTHER SYSTEMS REVIEWED AND NEGATIVE    UNABLE TO OBTAIN    PHYSICAL EXAM:  VITALS: T(C): 36.8 (02-20-22 @ 15:46)  T(F): 98.2 (02-20-22 @ 15:46), Max: 98.3 (02-20-22 @ 15:07)  HR: 78 (02-20-22 @ 15:46) (61 - 91)  BP: 169/87 (02-20-22 @ 15:46) (150/88 - 175/83)  RR:  (14 - 20)  SpO2:  (98% - 100%)  Wt(kg): --  GENERAL: NAD, well-groomed, well-developed  EYES: No proptosis, no lid lag  HEENT:  Atraumatic, Normocephalic  THYROID: Normal size, no palpable nodules  RESPIRATORY: Clear to auscultation bilaterally; No rales, rhonchi, wheezing  CARDIOVASCULAR: Si S2, No murmurs;  GI: Soft, non distended, normal bowel sounds  SKIN: Dry, intact, No rashes or lesions  MUSCULOSKELETAL: Has BL lower extremity edema.  NEURO:  no tremor, sensation decreased in feet BL,    POCT Blood Glucose.: 240 mg/dL (02-20-22 @ 10:17)  POCT Blood Glucose.: 226 mg/dL (02-19-22 @ 16:44)                            15.3   7.84  )-----------( 183      ( 20 Feb 2022 05:11 )             46.7       02-20    151<H>  |  113<H>  |  14  ----------------------------<  222<H>  3.6   |  23  |  1.07    EGFR if : 80  EGFR if non : 69    Ca    9.1      02-20  Mg     2.1     02-20  Phos  2.8     02-20    TPro  7.7  /  Alb  4.3  /  TBili  0.9  /  DBili  x   /  AST  18  /  ALT  20  /  AlkPhos  88  02-19      Thyroid Function Tests:              Radiology:

## 2022-02-20 NOTE — PATIENT PROFILE ADULT - FALL HARM RISK - FALLEN IN PAST
Pt attempted to contact his spouse 3 time while I was in the  Room with no success     Ludwig Hardin  02/10/20 1621     No

## 2022-02-20 NOTE — PATIENT PROFILE ADULT - FALL HARM RISK - HARM RISK INTERVENTIONS

## 2022-02-20 NOTE — PATIENT PROFILE ADULT - INTERPRETATION SERVICES DECLINED
Pt states he feels comfortable communicating in english at this time but when discussing medical information would like to use /Patient/Caregiver requests family/friend to interpret.

## 2022-02-20 NOTE — H&P ADULT - HISTORY OF PRESENT ILLNESS
72 yo M   no PMH with 1 week lethargy, weakness, fatigue, polyuria, polydipsia.   Went to PMD who diagnosed with htn and DM. Rx'd him with medication which  not started yet.   Symptoms worsened today so sent here.  .   No abdominal pain, no fever, no chest pain, no sob no dysuria. Endorses chills, transient blurred vision yesterday and chronic b/l LE paresthesias.   COVID vaccinated no sick contacts.

## 2022-02-20 NOTE — CONSULT NOTE ADULT - ASSESSMENT
Assessment  DMT2: 71y Male with newly dx diabetes with hyperglycemia, A1C pending, was not taking any hypoglycemic agents, admitted with lethargy/weakness/hyperglycemia and elevated bhb, started on basal insulin and coverage (first dose Lantus tonight), blood sugars running high and not at target, no hypoglycemic episodes, eating meals, NAD.  HTN: Controlled,  on antihypertensive medications.  HLD: On statin        Nini Carpenter MD  Cell: [phone]  Office: [phone]     Assessment  DMT2: 71y Male with newly dx diabetes with hyperglycemia, A1C pending, was not taking any hypoglycemic agents, admitted with lethargy/weakness/hyperglycemia and elevated bhb, started on basal insulin and coverage (first dose Lantus tonight), blood sugars running high and not at target, no hypoglycemic episodes, eating meals, NAD.  HTN: Controlled,  on antihypertensive medications.  HLD: On statin        feng rowley MD  [phone]

## 2022-02-20 NOTE — CONSULT NOTE ADULT - ASSESSMENT
72 yo M no PMH with 1 week lethargy, weakness, fatigue, polyuria, polydipsia.Went to PMD who diagnosed with htn and DM. Rx'd him with medication which  not started yet.   Symptoms worsened today so sent here.  .   No abdominal pain, no fever, no chest pain, no sob no dysuria. Endorses chills, transient blurred vision yesterday and chronic b/l LE paresthesias.   pt with new onset of DM with trop of 19, ?cause of sudden increase blood sugar  check cultures  fu cardiac enzymes  awaiting ecg  agree with echo  BL parasthenia ,cs spine x ray  hypernatremia, hydration  check lipid panel  increase bp/ dm add ace or ARB

## 2022-02-20 NOTE — CONSULT NOTE ADULT - PROBLEM SELECTOR RECOMMENDATION 9
Suggest to continue IV hydration as tolerated.  Will increase Lantus to 14u at bedtime.  Will start Admelog 4u before each meal and continue Admelog correction scale coverage. Will continue monitoring FS and FU. Will FU DC recommendations.  Patient counseled for compliance with consistent low carb diet and exercise as tolerated outpatient.

## 2022-02-20 NOTE — H&P ADULT - NSHPLABSRESULTS_GEN_ALL_CORE
LABS:                        15.3   7.84  )-----------( 183      ( 2022 05:11 )             46.7     -    151<H>  |  113<H>  |  14  ----------------------------<  222<H>  3.6   |  23  |  1.07    Ca    9.1      2022 05:11  Phos  2.8       Mg     2.1         TPro  7.7  /  Alb  4.3  /  TBili  0.9  /  DBili  x   /  AST  18  /  ALT  20  /  AlkPhos  88      PT/INR - ( 2022 20:44 )   PT: 12.6 sec;   INR: 1.05 ratio         PTT - ( 2022 20:44 )  PTT:43.1 sec      Urinalysis Basic - ( 2022 21:40 )    Color: Light Yellow / Appearance: Clear / S.011 / pH: x  Gluc: x / Ketone: Small  / Bili: Negative / Urobili: Negative   Blood: x / Protein: Negative / Nitrite: Negative   Leuk Esterase: Negative / RBC: x / WBC x   Sq Epi: x / Non Sq Epi: x / Bacteria: x           @ 20:43  3.2  32

## 2022-02-21 LAB
A1C WITH ESTIMATED AVERAGE GLUCOSE RESULT: 11.5 % — HIGH (ref 4–5.6)
ANION GAP SERPL CALC-SCNC: 15 MMOL/L — SIGNIFICANT CHANGE UP (ref 5–17)
BUN SERPL-MCNC: 13 MG/DL — SIGNIFICANT CHANGE UP (ref 7–23)
CALCIUM SERPL-MCNC: 9.5 MG/DL — SIGNIFICANT CHANGE UP (ref 8.4–10.5)
CHLORIDE SERPL-SCNC: 110 MMOL/L — HIGH (ref 96–108)
CHOLEST SERPL-MCNC: 217 MG/DL — HIGH
CO2 SERPL-SCNC: 25 MMOL/L — SIGNIFICANT CHANGE UP (ref 22–31)
CREAT SERPL-MCNC: 0.95 MG/DL — SIGNIFICANT CHANGE UP (ref 0.5–1.3)
ESTIMATED AVERAGE GLUCOSE: 283 MG/DL — HIGH (ref 68–114)
GLUCOSE BLDC GLUCOMTR-MCNC: 108 MG/DL — HIGH (ref 70–99)
GLUCOSE BLDC GLUCOMTR-MCNC: 130 MG/DL — HIGH (ref 70–99)
GLUCOSE BLDC GLUCOMTR-MCNC: 190 MG/DL — HIGH (ref 70–99)
GLUCOSE BLDC GLUCOMTR-MCNC: 193 MG/DL — HIGH (ref 70–99)
GLUCOSE SERPL-MCNC: 209 MG/DL — HIGH (ref 70–99)
HCT VFR BLD CALC: 51.5 % — HIGH (ref 39–50)
HCV AB S/CO SERPL IA: 0.12 S/CO — SIGNIFICANT CHANGE UP (ref 0–0.99)
HCV AB SERPL-IMP: SIGNIFICANT CHANGE UP
HDLC SERPL-MCNC: 76 MG/DL — SIGNIFICANT CHANGE UP
HGB BLD-MCNC: 16.6 G/DL — SIGNIFICANT CHANGE UP (ref 13–17)
LIPID PNL WITH DIRECT LDL SERPL: 123 MG/DL — HIGH
MCHC RBC-ENTMCNC: 28.1 PG — SIGNIFICANT CHANGE UP (ref 27–34)
MCHC RBC-ENTMCNC: 32.2 GM/DL — SIGNIFICANT CHANGE UP (ref 32–36)
MCV RBC AUTO: 87.3 FL — SIGNIFICANT CHANGE UP (ref 80–100)
NON HDL CHOLESTEROL: 141 MG/DL — HIGH
NRBC # BLD: 0 /100 WBCS — SIGNIFICANT CHANGE UP (ref 0–0)
PLATELET # BLD AUTO: 203 K/UL — SIGNIFICANT CHANGE UP (ref 150–400)
POTASSIUM SERPL-MCNC: 3.1 MMOL/L — LOW (ref 3.5–5.3)
POTASSIUM SERPL-SCNC: 3.1 MMOL/L — LOW (ref 3.5–5.3)
RBC # BLD: 5.9 M/UL — HIGH (ref 4.2–5.8)
RBC # FLD: 12.7 % — SIGNIFICANT CHANGE UP (ref 10.3–14.5)
SODIUM SERPL-SCNC: 150 MMOL/L — HIGH (ref 135–145)
TRIGL SERPL-MCNC: 92 MG/DL — SIGNIFICANT CHANGE UP
TSH SERPL-MCNC: 0.76 UIU/ML — SIGNIFICANT CHANGE UP (ref 0.27–4.2)
WBC # BLD: 7.51 K/UL — SIGNIFICANT CHANGE UP (ref 3.8–10.5)
WBC # FLD AUTO: 7.51 K/UL — SIGNIFICANT CHANGE UP (ref 3.8–10.5)

## 2022-02-21 PROCEDURE — 93010 ELECTROCARDIOGRAM REPORT: CPT

## 2022-02-21 RX ORDER — LANOLIN ALCOHOL/MO/W.PET/CERES
3 CREAM (GRAM) TOPICAL AT BEDTIME
Refills: 0 | Status: DISCONTINUED | OUTPATIENT
Start: 2022-02-21 | End: 2022-02-24

## 2022-02-21 RX ORDER — ACETAMINOPHEN 500 MG
650 TABLET ORAL EVERY 6 HOURS
Refills: 0 | Status: DISCONTINUED | OUTPATIENT
Start: 2022-02-21 | End: 2022-02-24

## 2022-02-21 RX ORDER — POTASSIUM CHLORIDE 20 MEQ
40 PACKET (EA) ORAL EVERY 4 HOURS
Refills: 0 | Status: COMPLETED | OUTPATIENT
Start: 2022-02-21 | End: 2022-02-21

## 2022-02-21 RX ORDER — INSULIN GLARGINE 100 [IU]/ML
18 INJECTION, SOLUTION SUBCUTANEOUS AT BEDTIME
Refills: 0 | Status: DISCONTINUED | OUTPATIENT
Start: 2022-02-21 | End: 2022-02-24

## 2022-02-21 RX ORDER — LOSARTAN POTASSIUM 100 MG/1
50 TABLET, FILM COATED ORAL DAILY
Refills: 0 | Status: DISCONTINUED | OUTPATIENT
Start: 2022-02-21 | End: 2022-02-24

## 2022-02-21 RX ORDER — LOSARTAN POTASSIUM 100 MG/1
25 TABLET, FILM COATED ORAL ONCE
Refills: 0 | Status: COMPLETED | OUTPATIENT
Start: 2022-02-21 | End: 2022-02-21

## 2022-02-21 RX ADMIN — LOSARTAN POTASSIUM 50 MILLIGRAM(S): 100 TABLET, FILM COATED ORAL at 11:58

## 2022-02-21 RX ADMIN — HEPARIN SODIUM 5000 UNIT(S): 5000 INJECTION INTRAVENOUS; SUBCUTANEOUS at 05:59

## 2022-02-21 RX ADMIN — Medication 4 UNIT(S): at 08:06

## 2022-02-21 RX ADMIN — Medication 4 UNIT(S): at 16:46

## 2022-02-21 RX ADMIN — INSULIN GLARGINE 18 UNIT(S): 100 INJECTION, SOLUTION SUBCUTANEOUS at 21:26

## 2022-02-21 RX ADMIN — Medication 4 UNIT(S): at 11:58

## 2022-02-21 RX ADMIN — Medication 40 MILLIEQUIVALENT(S): at 09:31

## 2022-02-21 RX ADMIN — Medication 81 MILLIGRAM(S): at 11:56

## 2022-02-21 RX ADMIN — Medication 1: at 16:45

## 2022-02-21 RX ADMIN — LOSARTAN POTASSIUM 25 MILLIGRAM(S): 100 TABLET, FILM COATED ORAL at 01:57

## 2022-02-21 RX ADMIN — LOSARTAN POTASSIUM 25 MILLIGRAM(S): 100 TABLET, FILM COATED ORAL at 05:55

## 2022-02-21 RX ADMIN — ATORVASTATIN CALCIUM 10 MILLIGRAM(S): 80 TABLET, FILM COATED ORAL at 21:26

## 2022-02-21 RX ADMIN — Medication 40 MILLIEQUIVALENT(S): at 16:21

## 2022-02-21 RX ADMIN — HEPARIN SODIUM 5000 UNIT(S): 5000 INJECTION INTRAVENOUS; SUBCUTANEOUS at 17:23

## 2022-02-21 RX ADMIN — Medication 1: at 08:06

## 2022-02-21 NOTE — PROVIDER CONTACT NOTE (OTHER) - BACKGROUND
Pt is a 71 year old male who presnted with 1 week of lethargy, weakness, fatigue, polyuria and polydipsia with newly diagnosed HTN and diabetes found to hypokalemia

## 2022-02-21 NOTE — PROGRESS NOTE ADULT - PROBLEM SELECTOR PLAN 1
Will increase Lantus to 18u at bedtime.  Will continue Admelog 4u before each meal and Admelog correction scale coverage. Will continue monitoring FS and FU.  Patient needs glucometer teaching, will FU DC recommendations.  Patient counseled for compliance with consistent low carb diet and exercise as tolerated outpatient.  Suggest to have RD eval for additional recommendations.

## 2022-02-22 LAB
ANION GAP SERPL CALC-SCNC: 12 MMOL/L — SIGNIFICANT CHANGE UP (ref 5–17)
BUN SERPL-MCNC: 13 MG/DL — SIGNIFICANT CHANGE UP (ref 7–23)
CALCIUM SERPL-MCNC: 9.3 MG/DL — SIGNIFICANT CHANGE UP (ref 8.4–10.5)
CHLORIDE SERPL-SCNC: 110 MMOL/L — HIGH (ref 96–108)
CHOLEST SERPL-MCNC: 196 MG/DL — SIGNIFICANT CHANGE UP
CO2 SERPL-SCNC: 25 MMOL/L — SIGNIFICANT CHANGE UP (ref 22–31)
CREAT SERPL-MCNC: 1.07 MG/DL — SIGNIFICANT CHANGE UP (ref 0.5–1.3)
GLUCOSE BLDC GLUCOMTR-MCNC: 132 MG/DL — HIGH (ref 70–99)
GLUCOSE BLDC GLUCOMTR-MCNC: 133 MG/DL — HIGH (ref 70–99)
GLUCOSE BLDC GLUCOMTR-MCNC: 140 MG/DL — HIGH (ref 70–99)
GLUCOSE BLDC GLUCOMTR-MCNC: 87 MG/DL — SIGNIFICANT CHANGE UP (ref 70–99)
GLUCOSE SERPL-MCNC: 126 MG/DL — HIGH (ref 70–99)
HDLC SERPL-MCNC: 69 MG/DL — SIGNIFICANT CHANGE UP
LIPID PNL WITH DIRECT LDL SERPL: 111 MG/DL — HIGH
NON HDL CHOLESTEROL: 127 MG/DL — SIGNIFICANT CHANGE UP
POTASSIUM SERPL-MCNC: 3.4 MMOL/L — LOW (ref 3.5–5.3)
POTASSIUM SERPL-SCNC: 3.4 MMOL/L — LOW (ref 3.5–5.3)
SODIUM SERPL-SCNC: 147 MMOL/L — HIGH (ref 135–145)
TRIGL SERPL-MCNC: 84 MG/DL — SIGNIFICANT CHANGE UP

## 2022-02-22 PROCEDURE — 93306 TTE W/DOPPLER COMPLETE: CPT | Mod: 26

## 2022-02-22 RX ORDER — POTASSIUM CHLORIDE 20 MEQ
20 PACKET (EA) ORAL ONCE
Refills: 0 | Status: COMPLETED | OUTPATIENT
Start: 2022-02-22 | End: 2022-02-22

## 2022-02-22 RX ADMIN — HEPARIN SODIUM 5000 UNIT(S): 5000 INJECTION INTRAVENOUS; SUBCUTANEOUS at 17:08

## 2022-02-22 RX ADMIN — Medication 20 MILLIEQUIVALENT(S): at 11:51

## 2022-02-22 RX ADMIN — Medication 81 MILLIGRAM(S): at 11:50

## 2022-02-22 RX ADMIN — ATORVASTATIN CALCIUM 10 MILLIGRAM(S): 80 TABLET, FILM COATED ORAL at 22:02

## 2022-02-22 RX ADMIN — INSULIN GLARGINE 18 UNIT(S): 100 INJECTION, SOLUTION SUBCUTANEOUS at 22:02

## 2022-02-22 RX ADMIN — SODIUM CHLORIDE 70 MILLILITER(S): 9 INJECTION, SOLUTION INTRAVENOUS at 18:40

## 2022-02-22 RX ADMIN — Medication 4 UNIT(S): at 09:05

## 2022-02-22 RX ADMIN — LOSARTAN POTASSIUM 50 MILLIGRAM(S): 100 TABLET, FILM COATED ORAL at 05:19

## 2022-02-22 RX ADMIN — Medication 4 UNIT(S): at 11:51

## 2022-02-22 RX ADMIN — HEPARIN SODIUM 5000 UNIT(S): 5000 INJECTION INTRAVENOUS; SUBCUTANEOUS at 05:08

## 2022-02-22 NOTE — DISCHARGE NOTE PROVIDER - NSDCCPCAREPLAN_GEN_ALL_CORE_FT
PRINCIPAL DISCHARGE DIAGNOSIS  Diagnosis: Hypokalemia  Assessment and Plan of Treatment:       SECONDARY DISCHARGE DIAGNOSES  Diagnosis: Hyperglycemia due to diabetes mellitus  Assessment and Plan of Treatment: Follow up with Dr Carpenter within 1 week for continued Diabetes management. Call office for appointment.  HgA1C this admission 11.5  Make sure you get your HgA1c checked every three months.  If you take oral diabetes medications, check your blood glucose two times a day.  If you take insulin, check your blood glucose before meals and at bedtime.  It's important not to skip any meals.  Keep a log of your blood glucose results and always take it with you to your doctor appointments.  Keep a list of your current medications including injectables and over the counter medications and bring this medication list with you to all your doctor appointments.  If you have not seen your ophthalmologist this year call for appointment.  Check your feet daily for redness, sores, or openings. Do not self treat. If no improvement in two days call your primary care physician for an appointment.  Low blood sugar (hypoglycemia) is a blood sugar below 70mg/dl. Check your blood sugar if you feel signs/symptoms of hypoglycemia. If your blood sugar is below 70 take 15 grams of carbohydrates (ex 4 oz of apple juice, 3-4 glucose tablets, or 4-6 oz of regular soda) wait 15 minutes and repeat blood sugar to make sure it comes up above 70.  If your blood sugar is above 70 and you are due for a meal, have a meal.  If you are not due for a meal have a snack.  This snack helps keeps your blood sugar at a safe range.       PRINCIPAL DISCHARGE DIAGNOSIS  Diagnosis: Hypokalemia  Assessment and Plan of Treatment: supplemented.   stable.      SECONDARY DISCHARGE DIAGNOSES  Diagnosis: Hyperglycemia due to diabetes mellitus  Assessment and Plan of Treatment: Follow up with Dr Carpenter within 1 week for continued Diabetes management. Call office for appointment.  HgA1C this admission 11.5  Make sure you get your HgA1c checked every three months.  If you take oral diabetes medications, check your blood glucose two times a day.  If you take insulin, check your blood glucose before meals and at bedtime.  It's important not to skip any meals.  Keep a log of your blood glucose results and always take it with you to your doctor appointments.  Keep a list of your current medications including injectables and over the counter medications and bring this medication list with you to all your doctor appointments.  If you have not seen your ophthalmologist this year call for appointment.  Check your feet daily for redness, sores, or openings. Do not self treat. If no improvement in two days call your primary care physician for an appointment.  Low blood sugar (hypoglycemia) is a blood sugar below 70mg/dl. Check your blood sugar if you feel signs/symptoms of hypoglycemia. If your blood sugar is below 70 take 15 grams of carbohydrates (ex 4 oz of apple juice, 3-4 glucose tablets, or 4-6 oz of regular soda) wait 15 minutes and repeat blood sugar to make sure it comes up above 70.  If your blood sugar is above 70 and you are due for a meal, have a meal.  If you are not due for a meal have a snack.  This snack helps keeps your blood sugar at a safe range.      Diagnosis: HTN (hypertension)  Assessment and Plan of Treatment: Low salt diet  Activity as tolerated.  Take all medication as prescribed.  Follow up with your medical doctor for routine blood pressure monitoring at your next visit.  Notify your doctor if you have any of the following symptoms:   Dizziness, Lightheadedness, Blurry vision, Headache, Chest pain, Shortness of breath      Diagnosis: Hypernatremia  Assessment and Plan of Treatment: s/p iv fluid.   improving.    Diagnosis: Polyuria  Assessment and Plan of Treatment: and weakness.   likely from newly diagnosed DM2.  please continue insulin as ordered.     PRINCIPAL DISCHARGE DIAGNOSIS  Diagnosis: Hypokalemia  Assessment and Plan of Treatment: supplemented.   stable.      SECONDARY DISCHARGE DIAGNOSES  Diagnosis: Hyperglycemia due to diabetes mellitus  Assessment and Plan of Treatment: Follow up with Dr Carpenter within 1 week for continued Diabetes management. Call office for appointment.  HgA1C this admission 11.5  Make sure you get your HgA1c checked every three months.  If you take oral diabetes medications, check your blood glucose two times a day.  If you take insulin, check your blood glucose before meals and at bedtime.  It's important not to skip any meals.  Keep a log of your blood glucose results and always take it with you to your doctor appointments.  Keep a list of your current medications including injectables and over the counter medications and bring this medication list with you to all your doctor appointments.  If you have not seen your ophthalmologist this year call for appointment.  Check your feet daily for redness, sores, or openings. Do not self treat. If no improvement in two days call your primary care physician for an appointment.  Low blood sugar (hypoglycemia) is a blood sugar below 70mg/dl. Check your blood sugar if you feel signs/symptoms of hypoglycemia. If your blood sugar is below 70 take 15 grams of carbohydrates (ex 4 oz of apple juice, 3-4 glucose tablets, or 4-6 oz of regular soda) wait 15 minutes and repeat blood sugar to make sure it comes up above 70.  If your blood sugar is above 70 and you are due for a meal, have a meal.  If you are not due for a meal have a snack.  This snack helps keeps your blood sugar at a safe range.    Diagnosis: HLD (hyperlipidemia)  Assessment and Plan of Treatment: Continue your Lipitor.  Eat a heart healthy diet that is low in saturated fats and salt, and includes whole grains, fruits, vegetables and lean protein; exercise regularly (consult with your physician or cardiologist first); maintain a heart healthy weight. Continue to follow with your primary physician or cardiologist.  Seek medical help for dizziness, Lightheadedness, Blurry vision, Headache, Chest pain, Shortness of breath    Diagnosis: HTN (hypertension)  Assessment and Plan of Treatment: Low salt diet.  Please continue your Losartan medication.  Activity as tolerated.  Take all medication as prescribed.  Follow up with your medical doctor for routine blood pressure monitoring at your next visit.  Notify your doctor if you have any of the following symptoms:   Dizziness, Lightheadedness, Blurry vision, Headache, Chest pain, Shortness of breath.    Diagnosis: Hypernatremia  Assessment and Plan of Treatment: s/p iv fluid.   improving.    Diagnosis: Polyuria  Assessment and Plan of Treatment: and weakness.   likely from newly diagnosed DM2.  please continue insulin as ordered.

## 2022-02-22 NOTE — DISCHARGE NOTE PROVIDER - HOSPITAL COURSE
72 yo M with no PMH with 1 week lethargy, weakness, fatigue, polyuria, polydipsia. Went to PMD who diagnosed with htn and DM. Rx'd him with medication which  not started yet.   Symptoms worsened today so sent here.  . No abdominal pain, no fever, no chest pain, no sob no dysuria. Endorses chills, transient blurred vision yesterday and chronic b/l LE paresthesias.   COVID vaccinated no sick contacts.    pt with weakness,  polyuria   had  no prior h/o dm  DM,  follow  fs    rx plan per sher littlejohn   on iv fluids on  dvt ppx  echo pending  Hypernatremia, defer fluids to endo  sodium has improved , is  147   needs   diabetic teaching  education/  per  rn, wife  was  educated  on asa, lipitor, cozaar/ lantus  and  admelog  sodium still at 147. on iv fluids  start   d/c  planning,  when cleaved    by  sher       72 yo M no PMHx with 1 week lethargy, weakness, fatigue, polyuria, polydipsia. Went to PMD who diagnosed with htn and DM. Rx'd him with medication which not started yet. . No abdominal pain, no fever, no chest pain, no sob no dysuria. Endorsed chills, transient blurred vision yesterday and chronic b/l LE paresthesias. COVID vaccinated no sick contacts.  pt with weakness,  polyuria   had  no prior h/o dm  DM, rx plan per endo dr littlejohn s/p IVF  Hypernatremia, deferred fluids to endo. Sodium was up to 151. 145 on day of discharge.  Diabetic teaching education provided. Per RN, wife  was  educated  on asa, lipitor, cozaar/ lantus  and  admelog. Endo recs upon discharge: Lantus 18 U qhs, Metformin 1000 mg BID, and diabetes supplies sent.     Pt medically cleared for discharge home with diabetes teaching and education. Discharge discussed with medical attending.

## 2022-02-22 NOTE — DISCHARGE NOTE PROVIDER - NSDCFUADDAPPT_GEN_ALL_CORE_FT
APPTS ARE READY TO BE MADE: [x ] YES    Best Family or Patient Contact (if needed):    Additional Information about above appointments (if needed):    1:   2:   3:     Other comments or requests:    APPTS ARE READY TO BE MADE: [X] YES    Best Family or Patient Contact (if needed):    Additional Information about above appointments (if needed):    1: Primary care  2: Endo     Other comments or requests:    APPTS ARE READY TO BE MADE: [X] YES    Best Family or Patient Contact (if needed):    Additional Information about above appointments (if needed):    1: Primary care  2: Endo     Other comments or requests:     Left 3 voicemails for pt. Awaiting callback if they need scheduling assistance.

## 2022-02-22 NOTE — DISCHARGE NOTE PROVIDER - NSDCMRMEDTOKEN_GEN_ALL_CORE_FT
alcohol swabs : Apply topically to affected area 4 times a day   aspirin 81 mg oral delayed release tablet: 1 tab(s) orally once a day  atorvastatin 20 mg oral tablet: 1 tab(s) orally once a day (at bedtime)  glucometer (per patient&#x27;s insurance): Test blood sugars four times a day. Dispense #1 glucometer.  Insulin Pen Needles, 4mm: 1 application subcutaneously 4 times a day. ** Use with insulin pen **   lancets: 1 application subcutaneously 4 times a day   Lantus Solostar Pen 100 units/mL subcutaneous solution: 18 unit(s) subcutaneous once a day   losartan 50 mg oral tablet: 1 tab(s) orally once a day  metFORMIN 1000 mg oral tablet: 1 tab(s) orally 2 times a day    alcohol swabs : Apply topically to affected area 4 times a day   aspirin 81 mg oral delayed release tablet: 1 tab(s) orally once a day  atorvastatin 20 mg oral tablet: 1 tab(s) orally once a day (at bedtime)  Basaglar KwikPen 100 units/mL subcutaneous solution: 18 unit(s) subcutaneous once a day (at bedtime)   glucometer (per patient&#x27;s insurance): Test blood sugars four times a day. Dispense #1 glucometer.  Insulin Pen Needles, 4mm: 1 application subcutaneously 4 times a day. ** Use with insulin pen **   lancets: 1 application subcutaneously 4 times a day   losartan 50 mg oral tablet: 1 tab(s) orally once a day  metFORMIN 1000 mg oral tablet: 1 tab(s) orally 2 times a day

## 2022-02-22 NOTE — DIETITIAN INITIAL EVALUATION ADULT. - ADD RECOMMEND
1) Continue with consistent carbohydrate diet w/snacks. 2) Recommended Glucerna 1 x daily to optimize protein and energy intake. 3) Will continue to monitor PO intake, diet, weight, GI status, and labs. 4) provided education on the DM/ well balanced meals, and nutrition dense snacks between meals. 5) Made aware RD and Dietetic Intern remain available. 6) Malnutrition sticker placed in chart

## 2022-02-22 NOTE — PROGRESS NOTE ADULT - PROBLEM SELECTOR PLAN 1
Will continue current insulin regimen for now. Will continue monitoring FS and FU.  Patient needs glucometer teaching, will continue monitoring and FU DC recommendations.  Patient counseled for compliance with consistent low carb diet and exercise as tolerated outpatient.  Suggest to have RD ariel for additional recommendations.

## 2022-02-22 NOTE — DIETITIAN INITIAL EVALUATION ADULT. - OTHER INFO
This admission: pt reports good appetite, but poor PO intake in the setting of dislike of hospital food. Pt states consuming 35% of all meals.    Pt confirms NKFA. Denies N/V/D/C, and swallowing and chewing difficulty.    Pt reports weight change PTA, reports UBW 154lbs. Weight as per flow sheet/ dosing weight 145lbs(2/20) indicates 9lbs in 1 x week.     Education: reviewed education on DM/ well balanced meals with healthy nutrient dense snacks between meals to optimize PO intake. Verbally explained the importance/ benefits of consistent carbohydrate intake/potion sizes. Encouraged to implement protein, carb, and vegetables in each meal. Pt advised to avoid concentrated sweets/beverages. This admission: pt reports good appetite, but poor PO intake in the setting of dislike of hospital food. Pt states consuming 35% of all meals.    Pt confirms NKFA. Denies N/V/D/C, and swallowing and chewing difficulty.    Pt reports weight change PTA, reports UBW 154lbs. Weight as per flow sheet/ dosing weight 145lbs(2/20) indicates 9lbs weight loss in 1 x week.     Education: reviewed education on DM/ well balanced meals with healthy nutrient dense snacks between meals to optimize PO intake. Verbally explained the importance/ benefits of consistent carbohydrate intake/potion sizes. Encouraged to implement protein, and non-starchy vegetables in each meal. Pt advised to avoid concentrated sweets/beverages. Provided written material. This admission: pt reports good appetite, but poor PO intake in the setting of dislike of hospital food. Pt states consuming 35% of all meals. Pt is amenable to Glucerna 1 x daily (220 kcal and 10 gram of protein)    Pt confirms NKFA. Denies N/V/D/C, and swallowing and chewing difficulty. Last BM unclear at this time.    Pt reports weight change PTA, reports UBW 154lbs. Weight as per flow sheet/ dosing weight 145lbs(2/20) indicates 9lbs weight loss in 1 x week.     Education: reviewed education on DM/ well balanced meals with healthy nutrient dense snacks between meals to optimize PO intake. Verbally explained the importance/ benefits of consistent carbohydrate intake/potion sizes. Encouraged to implement protein, and non-starchy vegetables in each meal. Pt advised to avoid concentrated sweets/beverages. Provided written material.

## 2022-02-22 NOTE — DISCHARGE NOTE PROVIDER - DISCHARGE DIET
Consistent Carbohydrate Diabetic Diets Consistent Carbohydrate Diabetic Diets/Other Diet Instructions

## 2022-02-22 NOTE — DISCHARGE NOTE PROVIDER - CARE PROVIDER_API CALL
richard guevara  [address] [address]  Phone: [phone]  Fax: (   )    -  Scheduled Appointment: 03/03/2022 01:00 PM   richard guevara  [address] [place] [address]  Phone: [phone]  Fax: (   )    -  Scheduled Appointment: 03/03/2022 01:00 PM    Nini Carpenter)  EndocrinologyMetabDiabetes; Internal Medicine  [address] [place] [address]  Phone: [phone]  Fax: [phone]  Follow Up Time: 1 week

## 2022-02-22 NOTE — DIETITIAN INITIAL EVALUATION ADULT. - CHIEF COMPLAINT
Per chart note, patient is a 71y Male with PMHx: HTN, and HLD. Pt presented with lethargy/weakness/hyperglycemia, and admitted for DM, with A1C 11.5%, started on basal bolus insulin.

## 2022-02-22 NOTE — DIETITIAN INITIAL EVALUATION ADULT. - LITERATURE/VIDEOS GIVEN
Diabetes nutrition therapy by [employer] Carbohydrate Counting for People with Diabetes by [employer]

## 2022-02-22 NOTE — DIETITIAN INITIAL EVALUATION ADULT. - ORAL INTAKE PTA/DIET HISTORY
Pt is alert and oriented. Pt reports fair appetite and PO intake PTA, follows regular diet, eats 1-2 meals/day. Per pt, has tried to eat more food in the last 2 x weeks, due to feeling weak and believes that eating more will help. Pt reports daughter does the cooking/ shopping. Pt denies taking any supplements and vitamins. Pt newly diagnosed with MD, HbA1c:11.5%(2/21) indicates poor control. Pt is amenable to Glucerna 1 x daily ( 220 kcal and 9.9 gram of protein) Pt is alert and oriented. Pt reports fair appetite and PO intake PTA, follows regular diet, eats 1-2 meals/day. Per pt, has tried to eat more food in the last 2 x weeks, due to feeling weak and believes that eating more will help. Pt reports daughter does the cooking/ shopping. Pt denies taking any supplements and vitamins. Pt newly diagnosed with DM, HbA1c: 11.5%(2/21) indicates poor control.

## 2022-02-22 NOTE — DIETITIAN INITIAL EVALUATION ADULT. - SIGNS/SYMPTOMS
decreased PO intake <50% for >5 days, 5.8% weight loss 1 xweek 24 hours recall, HbA1C 11.5% decreased PO intake <50% for >5 days, 5.8% weight loss 1x week 24 hours diet recall, pt with new DM diagnosis with HbA1C 11.5%

## 2022-02-22 NOTE — DISCHARGE NOTE PROVIDER - PROVIDER TOKENS
FREE:[LAST:[paola],FIRST:[richard],PHONE:[phone]],FAX:[(   )    -],ADDRESS:[[address] [place] [address]],SCHEDULEDAPPT:[03/03/2022],SCHEDULEDAPPTTIME:[01:00 PM]] FREE:[LAST:[paola],FIRST:[richard],PHONE:[phone]],FAX:[(   )    -],ADDRESS:[[address] [place] [address]],SCHEDULEDAPPT:[03/03/2022],SCHEDULEDAPPTTIME:[01:00 PM]],PROVIDER:[TOKEN:[[id number]],FOLLOWUP:[1 week]]

## 2022-02-23 DIAGNOSIS — E87.0 HYPEROSMOLALITY AND HYPERNATREMIA: ICD-10-CM

## 2022-02-23 LAB
ANION GAP SERPL CALC-SCNC: 13 MMOL/L — SIGNIFICANT CHANGE UP (ref 5–17)
BUN SERPL-MCNC: 13 MG/DL — SIGNIFICANT CHANGE UP (ref 7–23)
CALCIUM SERPL-MCNC: 9.1 MG/DL — SIGNIFICANT CHANGE UP (ref 8.4–10.5)
CHLORIDE SERPL-SCNC: 109 MMOL/L — HIGH (ref 96–108)
CO2 SERPL-SCNC: 25 MMOL/L — SIGNIFICANT CHANGE UP (ref 22–31)
CREAT SERPL-MCNC: 1.05 MG/DL — SIGNIFICANT CHANGE UP (ref 0.5–1.3)
GLUCOSE BLDC GLUCOMTR-MCNC: 103 MG/DL — HIGH (ref 70–99)
GLUCOSE BLDC GLUCOMTR-MCNC: 106 MG/DL — HIGH (ref 70–99)
GLUCOSE BLDC GLUCOMTR-MCNC: 218 MG/DL — HIGH (ref 70–99)
GLUCOSE BLDC GLUCOMTR-MCNC: 87 MG/DL — SIGNIFICANT CHANGE UP (ref 70–99)
GLUCOSE SERPL-MCNC: 136 MG/DL — HIGH (ref 70–99)
POTASSIUM SERPL-MCNC: 3.2 MMOL/L — LOW (ref 3.5–5.3)
POTASSIUM SERPL-SCNC: 3.2 MMOL/L — LOW (ref 3.5–5.3)
SODIUM SERPL-SCNC: 147 MMOL/L — HIGH (ref 135–145)

## 2022-02-23 RX ORDER — POTASSIUM CHLORIDE 20 MEQ
40 PACKET (EA) ORAL EVERY 4 HOURS
Refills: 0 | Status: COMPLETED | OUTPATIENT
Start: 2022-02-23 | End: 2022-02-23

## 2022-02-23 RX ORDER — SODIUM CHLORIDE 9 MG/ML
1000 INJECTION, SOLUTION INTRAVENOUS
Refills: 0 | Status: DISCONTINUED | OUTPATIENT
Start: 2022-02-23 | End: 2022-02-24

## 2022-02-23 RX ADMIN — HEPARIN SODIUM 5000 UNIT(S): 5000 INJECTION INTRAVENOUS; SUBCUTANEOUS at 17:06

## 2022-02-23 RX ADMIN — Medication 4 UNIT(S): at 12:08

## 2022-02-23 RX ADMIN — LOSARTAN POTASSIUM 50 MILLIGRAM(S): 100 TABLET, FILM COATED ORAL at 05:14

## 2022-02-23 RX ADMIN — Medication 81 MILLIGRAM(S): at 12:08

## 2022-02-23 RX ADMIN — HEPARIN SODIUM 5000 UNIT(S): 5000 INJECTION INTRAVENOUS; SUBCUTANEOUS at 05:15

## 2022-02-23 RX ADMIN — Medication 2: at 08:34

## 2022-02-23 RX ADMIN — Medication 4 UNIT(S): at 08:35

## 2022-02-23 RX ADMIN — SODIUM CHLORIDE 70 MILLILITER(S): 9 INJECTION, SOLUTION INTRAVENOUS at 05:14

## 2022-02-23 RX ADMIN — Medication 4 UNIT(S): at 17:06

## 2022-02-23 RX ADMIN — ATORVASTATIN CALCIUM 10 MILLIGRAM(S): 80 TABLET, FILM COATED ORAL at 22:44

## 2022-02-23 RX ADMIN — SODIUM CHLORIDE 60 MILLILITER(S): 9 INJECTION, SOLUTION INTRAVENOUS at 17:11

## 2022-02-23 RX ADMIN — Medication 40 MILLIEQUIVALENT(S): at 14:27

## 2022-02-23 RX ADMIN — Medication 40 MILLIEQUIVALENT(S): at 10:44

## 2022-02-23 NOTE — PROGRESS NOTE ADULT - PROBLEM SELECTOR PLAN 1
Will repeat bhb tomorrow.  Will continue current insulin regimen for now. Will continue monitoring FS and FU.  Insulin and glucometer teaching completed; Suggest DC on the following DM regimen:  -Lantus 18u at bedtime  -Metformin 1000mg BID  -Needs glucometer and supplies  -FU endo 4 weeks  Discussed plan with patient. Counseled on adjusting insulin dose based on blood sugars, counseled for compliance with consistent low carb diet and exercise as tolerated outpatient.  S/P RD eval for additional recommendations. Will repeat BMp tomorrow.  Will continue current insulin regimen for now. Will continue monitoring FS and FU.  Insulin and glucometer teaching completed; Suggest DC on the following DM regimen:  -Lantus 18u at bedtime  -Metformin 1000mg BID  -Needs glucometer and supplies  -FU endo 4 weeks  Discussed plan with patient. Counseled on adjusting insulin dose based on blood sugars, counseled for compliance with consistent low carb diet and exercise as tolerated outpatient.  S/P RD eval for additional recommendations.

## 2022-02-23 NOTE — PROGRESS NOTE ADULT - NUTRITIONAL ASSESSMENT
This patient has been assessed with a concern for Malnutrition and has been determined to have a diagnosis/diagnoses of Severe protein-calorie malnutrition.    This patient is being managed with:   Diet Consistent Carbohydrate w/Evening Snack-  Supplement Feeding Modality:  Oral  Glucerna Shake Cans or Servings Per Day:  1       Frequency:  Daily  Entered: Feb 22 2022  9:04PM

## 2022-02-23 NOTE — PROVIDER CONTACT NOTE (OTHER) - RECOMMENDATIONS
clarify [facility] admin. for BS 87 at bedtime clarify lantus admin. for BS 87 at bedtime

## 2022-02-23 NOTE — PROVIDER CONTACT NOTE (OTHER) - REASON
Pt wi\th BS 87 symptomatic  18u lantus due Pt reports poor p.o. intake at dinner Pt with BS 87 asymptomatic - 18u lantus due at bedtime . Pt reports poor p.o. intake at dinner

## 2022-02-23 NOTE — PROVIDER CONTACT NOTE (OTHER) - BACKGROUND
Pt wi\th BS 87 symptomatic  18u lantus due Pt reports poor p.o. intake at dinner Pt with BS 87 symptomatic  -18u lantus due.  Pt reports poor p.o. intake at dinner

## 2022-02-24 VITALS
TEMPERATURE: 98 F | SYSTOLIC BLOOD PRESSURE: 142 MMHG | OXYGEN SATURATION: 99 % | DIASTOLIC BLOOD PRESSURE: 79 MMHG | HEART RATE: 67 BPM | RESPIRATION RATE: 18 BRPM

## 2022-02-24 LAB
ANION GAP SERPL CALC-SCNC: 13 MMOL/L — SIGNIFICANT CHANGE UP (ref 5–17)
BUN SERPL-MCNC: 12 MG/DL — SIGNIFICANT CHANGE UP (ref 7–23)
CALCIUM SERPL-MCNC: 9.2 MG/DL — SIGNIFICANT CHANGE UP (ref 8.4–10.5)
CHLORIDE SERPL-SCNC: 108 MMOL/L — SIGNIFICANT CHANGE UP (ref 96–108)
CO2 SERPL-SCNC: 24 MMOL/L — SIGNIFICANT CHANGE UP (ref 22–31)
CREAT SERPL-MCNC: 1.08 MG/DL — SIGNIFICANT CHANGE UP (ref 0.5–1.3)
GLUCOSE BLDC GLUCOMTR-MCNC: 127 MG/DL — HIGH (ref 70–99)
GLUCOSE BLDC GLUCOMTR-MCNC: 128 MG/DL — HIGH (ref 70–99)
GLUCOSE BLDC GLUCOMTR-MCNC: 153 MG/DL — HIGH (ref 70–99)
GLUCOSE BLDC GLUCOMTR-MCNC: 173 MG/DL — HIGH (ref 70–99)
GLUCOSE BLDC GLUCOMTR-MCNC: 78 MG/DL — SIGNIFICANT CHANGE UP (ref 70–99)
GLUCOSE SERPL-MCNC: 193 MG/DL — HIGH (ref 70–99)
POTASSIUM SERPL-MCNC: 3.6 MMOL/L — SIGNIFICANT CHANGE UP (ref 3.5–5.3)
POTASSIUM SERPL-SCNC: 3.6 MMOL/L — SIGNIFICANT CHANGE UP (ref 3.5–5.3)
SODIUM SERPL-SCNC: 145 MMOL/L — SIGNIFICANT CHANGE UP (ref 135–145)

## 2022-02-24 PROCEDURE — 82435 ASSAY OF BLOOD CHLORIDE: CPT

## 2022-02-24 PROCEDURE — U0003: CPT

## 2022-02-24 PROCEDURE — 82010 KETONE BODYS QUAN: CPT

## 2022-02-24 PROCEDURE — 80061 LIPID PANEL: CPT

## 2022-02-24 PROCEDURE — 71045 X-RAY EXAM CHEST 1 VIEW: CPT

## 2022-02-24 PROCEDURE — 84100 ASSAY OF PHOSPHORUS: CPT

## 2022-02-24 PROCEDURE — 84132 ASSAY OF SERUM POTASSIUM: CPT

## 2022-02-24 PROCEDURE — 84484 ASSAY OF TROPONIN QUANT: CPT

## 2022-02-24 PROCEDURE — 80053 COMPREHEN METABOLIC PANEL: CPT

## 2022-02-24 PROCEDURE — 93005 ELECTROCARDIOGRAM TRACING: CPT

## 2022-02-24 PROCEDURE — 85027 COMPLETE CBC AUTOMATED: CPT

## 2022-02-24 PROCEDURE — 36415 COLL VENOUS BLD VENIPUNCTURE: CPT

## 2022-02-24 PROCEDURE — 85610 PROTHROMBIN TIME: CPT

## 2022-02-24 PROCEDURE — 83036 HEMOGLOBIN GLYCOSYLATED A1C: CPT

## 2022-02-24 PROCEDURE — 82330 ASSAY OF CALCIUM: CPT

## 2022-02-24 PROCEDURE — 85014 HEMATOCRIT: CPT

## 2022-02-24 PROCEDURE — 82947 ASSAY GLUCOSE BLOOD QUANT: CPT

## 2022-02-24 PROCEDURE — 99285 EMERGENCY DEPT VISIT HI MDM: CPT

## 2022-02-24 PROCEDURE — 93306 TTE W/DOPPLER COMPLETE: CPT

## 2022-02-24 PROCEDURE — 82962 GLUCOSE BLOOD TEST: CPT

## 2022-02-24 PROCEDURE — 85730 THROMBOPLASTIN TIME PARTIAL: CPT

## 2022-02-24 PROCEDURE — U0005: CPT

## 2022-02-24 PROCEDURE — 85025 COMPLETE CBC W/AUTO DIFF WBC: CPT

## 2022-02-24 PROCEDURE — 86803 HEPATITIS C AB TEST: CPT

## 2022-02-24 PROCEDURE — 83735 ASSAY OF MAGNESIUM: CPT

## 2022-02-24 PROCEDURE — 85018 HEMOGLOBIN: CPT

## 2022-02-24 PROCEDURE — 81003 URINALYSIS AUTO W/O SCOPE: CPT

## 2022-02-24 PROCEDURE — 84443 ASSAY THYROID STIM HORMONE: CPT

## 2022-02-24 PROCEDURE — 83605 ASSAY OF LACTIC ACID: CPT

## 2022-02-24 PROCEDURE — 84295 ASSAY OF SERUM SODIUM: CPT

## 2022-02-24 PROCEDURE — 82803 BLOOD GASES ANY COMBINATION: CPT

## 2022-02-24 PROCEDURE — 80048 BASIC METABOLIC PNL TOTAL CA: CPT

## 2022-02-24 RX ORDER — INSULIN GLARGINE 100 [IU]/ML
14 INJECTION, SOLUTION SUBCUTANEOUS ONCE
Refills: 0 | Status: COMPLETED | OUTPATIENT
Start: 2022-02-24 | End: 2022-02-24

## 2022-02-24 RX ORDER — ATORVASTATIN CALCIUM 80 MG/1
1 TABLET, FILM COATED ORAL
Qty: 30 | Refills: 0
Start: 2022-02-24 | End: 2022-03-25

## 2022-02-24 RX ORDER — LOSARTAN POTASSIUM 100 MG/1
1 TABLET, FILM COATED ORAL
Qty: 30 | Refills: 0
Start: 2022-02-24 | End: 2022-03-25

## 2022-02-24 RX ORDER — ASPIRIN/CALCIUM CARB/MAGNESIUM 324 MG
1 TABLET ORAL
Qty: 30 | Refills: 0
Start: 2022-02-24 | End: 2022-03-25

## 2022-02-24 RX ORDER — INSULIN GLARGINE 100 [IU]/ML
18 INJECTION, SOLUTION SUBCUTANEOUS
Qty: 1 | Refills: 0
Start: 2022-02-24 | End: 2022-03-25

## 2022-02-24 RX ORDER — ATORVASTATIN CALCIUM 80 MG/1
20 TABLET, FILM COATED ORAL AT BEDTIME
Refills: 0 | Status: DISCONTINUED | OUTPATIENT
Start: 2022-02-24 | End: 2022-02-24

## 2022-02-24 RX ORDER — ENOXAPARIN SODIUM 100 MG/ML
18 INJECTION SUBCUTANEOUS
Qty: 1 | Refills: 0
Start: 2022-02-24 | End: 2022-03-25

## 2022-02-24 RX ORDER — ISOPROPYL ALCOHOL, BENZOCAINE .7; .06 ML/ML; ML/ML
1 SWAB TOPICAL
Qty: 100 | Refills: 1
Start: 2022-02-24 | End: 2022-04-14

## 2022-02-24 RX ORDER — METFORMIN HYDROCHLORIDE 850 MG/1
1 TABLET ORAL
Qty: 60 | Refills: 0
Start: 2022-02-24 | End: 2022-03-25

## 2022-02-24 RX ADMIN — Medication 1: at 11:27

## 2022-02-24 RX ADMIN — Medication 4 UNIT(S): at 08:24

## 2022-02-24 RX ADMIN — Medication 1: at 08:21

## 2022-02-24 RX ADMIN — INSULIN GLARGINE 14 UNIT(S): 100 INJECTION, SOLUTION SUBCUTANEOUS at 01:55

## 2022-02-24 RX ADMIN — Medication 81 MILLIGRAM(S): at 11:27

## 2022-02-24 RX ADMIN — Medication 4 UNIT(S): at 11:28

## 2022-02-24 NOTE — PROGRESS NOTE ADULT - PROBLEM SELECTOR PLAN 3
Suggest to continue medications, monitoring, FU primary team recommendations.
Will continue statin, primary team FU.
Will continue statin, primary team FU.
Suggest to continue medications, monitoring, FU primary team recommendations.

## 2022-02-24 NOTE — DISCHARGE NOTE NURSING/CASE MANAGEMENT/SOCIAL WORK - NSDCPEFALRISK_GEN_ALL_CORE
For information on Fall & Injury Prevention, visit: [email or web address] OR  [email or web address] OR  https://www.cdc.gov/steadi/patient.html

## 2022-02-24 NOTE — PROGRESS NOTE ADULT - PROVIDER SPECIALTY LIST ADULT
Cardiology
Internal Medicine
Cardiology
Internal Medicine
Endocrinology

## 2022-02-24 NOTE — PROGRESS NOTE ADULT - ASSESSMENT
Assessment  DMT2: 71y Male with newly dx diabetes with hyperglycemia, A1C 11.5%, was not taking any hypoglycemic agents, admitted with lethargy/weakness/hyperglycemia and elevated bhb, now on basal bolus insulin, received partial-dose Lantus last night resulting in elevated blood sugar this morning, blood sugars in otherwise acceptable range, no hypoglycemic episodes. Patient appears alert and comfortable, eating meals with improved appetite, well-appearing and eager for DC.  Hypernatremia: likely from dehydration, s/p IV fluids and free water 250mL/6hr, Na labs improved now, asymptomatic.  HTN: Controlled,  on antihypertensive medications.  HLD: On statin        feng rowley MD  [phone]  
  Assessment  DMT2: 71y Male with newly dx diabetes with hyperglycemia, A1C 11.5%, was not taking any hypoglycemic agents, admitted with lethargy/weakness/hyperglycemia and elevated bhb, started on basal bolus insulin, blood sugars improved and trending within overall acceptable range, no hypoglycemic episodes. Patient appears alert and comfortable, eating meals with improving appetite, NAD.  Hypernatremia: likely from dehydration, receiving IV fluids and free water 250mL/8hr, Na remains elevated 147 today, asymptomatic.  HTN: Controlled,  on antihypertensive medications.  HLD: On statin        feng rowley MD  [id number]  
  Assessment  DMT2: 71y Male with newly dx diabetes with hyperglycemia, A1C 11.5%, was not taking any hypoglycemic agents, admitted with lethargy/weakness/hyperglycemia and elevated bhb, started on basal bolus insulin, increased dose yesterday, blood sugars improving though still not at target, no hypoglycemic episodes. Patient appears alert and comfortable, eating meals, NAD.  HTN: Controlled,  on antihypertensive medications.  HLD: On statin        feng rowley MD  [phone]  
72 yo M   no PMH with 1 week lethargy, weakness, fatigue, polyuria, polydipsia.   Went to PMD who diagnosed with htn and DM. Rx'd him with medication which  not started yet.   Symptoms worsened today so sent here.  .   No abdominal pain, no fever, no chest pain, no sob no dysuria.   Endorses chills, transient blurred vision yesterday and chronic b/l LE paresthesias.   COVID vaccinated no sick contacts.      pt with weakness,  polyuria   had  no prior h/o dm  DM,  follow  fs    rx plan per sher littlejohn   on iv fluids on  dvt ppx  echo pending  Hypernatremia, defer fluids to endo  sodium has improved , is  147   needs   diabetic teaching  education/  per  rn, wife  was  educated  on asa, lipitor, cozaar/ lantus  and  admelog  sodium still at 147. on iv fluids  start   d/c  planning,  when cleaved    by  sher            
70 yo M   no PMH with 1 week lethargy, weakness, fatigue, polyuria, polydipsia.   Went to PMD who diagnosed with htn and DM. Rx'd him with medication which  not started yet.   Symptoms worsened today so sent here.  .   No abdominal pain, no fever, no chest pain, no sob no dysuria.   Endorses chills, transient blurred vision yesterday and chronic b/l LE paresthesias.   COVID vaccinated no sick contacts.      pt with weakness,  polyuria   had  no prior h/o dm  DM,  follow  fs    rx plan per sher littlejohn   on iv fluids on  dvt ppx  Hypernatremia, defer fluids to endo  sodium has improved , is  147   needs   diabetic teaching  education/  per  rn, wife  was  educated  on asa, lipitor, cozaar/ lantus  and  admelog    bmp  pending,  follow sodium  hopefully   d/c today . on insulin regimen pe r endo            
72 yo M   no PMH with 1 week lethargy, weakness, fatigue, polyuria, polydipsia.   Went to PMD who diagnosed with htn and DM. Rx'd him with medication which  not started yet.   Symptoms worsened today so sent here.  .   No abdominal pain, no fever, no chest pain, no sob no dysuria.   Endorses chills, transient blurred vision yesterday and chronic b/l LE paresthesias.   COVID vaccinated no sick contacts.      pt with weakness,  polyuria   had  noprior h/o dm  DM,  follow  fs  on lantus/    endo dr littlejohn   on iv fluids on  dvt ppx  echo pending  Hypernatremia, defer fluids to endo   team aware   need s  diabetic teaching  education            
  Assessment  DMT2: 71y Male with newly dx diabetes with hyperglycemia, A1C 11.5%, was not taking any hypoglycemic agents, admitted with lethargy/weakness/hyperglycemia and elevated bhb, started on basal bolus insulin, increased Lantus dose yesterday, blood sugars improving and trending within acceptable range today, no hypoglycemic episodes. Patient appears alert and comfortable, eating partial meals, NAD.  HTN: Controlled,  on antihypertensive medications.  HLD: On statin        feng rowley MD  [phone]  
70 yo M   no PMH with 1 week lethargy, weakness, fatigue, polyuria, polydipsia.   Went to PMD who diagnosed with htn and DM. Rx'd him with medication which  not started yet.   Symptoms worsened today so sent here.  .   No abdominal pain, no fever, no chest pain, no sob no dysuria.   Endorses chills, transient blurred vision yesterday and chronic b/l LE paresthesias.   COVID vaccinated no sick contacts.      pt with weakness,  polyuria   had  no prior h/o dm  DM,  follow  fs    rx plan per sher littlejohn   on iv fluids on  dvt ppx  echo pending  Hypernatremia, defer fluids to endo  sodium has improved , is  147   needs   diabetic teaching  education/  per  rn, wife  was  educated  bmp in am  on asa, lipitor, cozaar/ lantus  and  admelog

## 2022-02-24 NOTE — PROGRESS NOTE ADULT - PROBLEM SELECTOR PLAN 1
Will continue current insulin regimen for now. Will continue monitoring FS and FU.  Insulin and glucometer teaching completed; Suggest DC on the following DM regimen:  -Lantus 18u at bedtime  -Metformin 1000mg BID  -Needs glucometer and supplies  -FU endo 4 weeks  Discussed plan with patient. Counseled on adjusting insulin dose based on blood sugars, counseled for compliance with consistent low carb diet and exercise as tolerated outpatient.  S/P RD eval for additional recommendations.

## 2022-02-24 NOTE — DISCHARGE NOTE NURSING/CASE MANAGEMENT/SOCIAL WORK - PATIENT PORTAL LINK FT
You can access the FollowMyHealth Patient Portal offered by [facility] by registering at the following website: [email or web address]. By joining [email or web address]’s FollowMyHealth portal, you will also be able to view your health information using other applications (apps) compatible with our system.

## 2022-02-24 NOTE — PROGRESS NOTE ADULT - PROBLEM SELECTOR PLAN 2
Suggest to continue medications, monitoring, FU primary team recommendations.
Suggest to continue fluids, will increase free water to 250mL q6.  Will continue monitoring labs and FU.
Suggest to continue medications, monitoring, FU primary team recommendations.
Improved with fluids;   Will continue monitoring labs and FU.

## 2022-02-24 NOTE — PROVIDER CONTACT NOTE (OTHER) - ACTION/TREATMENT ORDERED:
As per NP repeat BS now then provide pt with snack and repeat BS post 15min call provider to make aware . Pt initially refused meds/snack  and fingerstick provider made aware .
Losartan 25mg oral to be given stat.
As per NP no new orders at this time

## 2022-02-24 NOTE — PROGRESS NOTE ADULT - SUBJECTIVE AND OBJECTIVE BOX
CARDIOLOGY     PROGRESS  NOTE   ________________________________________________    CHIEF COMPLAINT:Patient is a 71y old  Male who presents with a chief complaint of weakness (22 Feb 2022 21:07)  no complain.  	  REVIEW OF SYSTEMS:  CONSTITUTIONAL: No fever, weight loss, or fatigue  EYES: No eye pain, visual disturbances, or discharge  ENT:  No difficulty hearing, tinnitus, vertigo; No sinus or throat pain  NECK: No pain or stiffness  RESPIRATORY: No cough, wheezing, chills or hemoptysis; No Shortness of Breath  CARDIOVASCULAR: No chest pain, palpitations, passing out, dizziness, or leg swelling  GASTROINTESTINAL: No abdominal or epigastric pain. No nausea, vomiting, or hematemesis; No diarrhea or constipation. No melena or hematochezia.  GENITOURINARY: No dysuria, frequency, hematuria, or incontinence  NEUROLOGICAL: No headaches, memory loss, loss of strength, numbness, or tremors  SKIN: No itching, burning, rashes, or lesions   LYMPH Nodes: No enlarged glands  ENDOCRINE: No heat or cold intolerance; No hair loss  MUSCULOSKELETAL: No joint pain or swelling; No muscle, back, or extremity pain  PSYCHIATRIC: No depression, anxiety, mood swings, or difficulty sleeping  HEME/LYMPH: No easy bruising, or bleeding gums  ALLERGY AND IMMUNOLOGIC: No hives or eczema	    [ ] All others negative	  [ ] Unable to obtain    PHYSICAL EXAM:  T(C): 36.4 (02-23-22 @ 04:34), Max: 36.9 (02-22-22 @ 19:50)  HR: 57 (02-23-22 @ 04:34) (57 - 69)  BP: 150/85 (02-23-22 @ 04:34) (129/85 - 163/93)  RR: 18 (02-23-22 @ 04:34) (18 - 18)  SpO2: 99% (02-23-22 @ 04:34) (98% - 99%)  Wt(kg): --  I&O's Summary    22 Feb 2022 07:01  -  23 Feb 2022 07:00  --------------------------------------------------------  IN: 1560 mL / OUT: 1925 mL / NET: -365 mL        Appearance: Normal	  HEENT:   Normal oral mucosa, PERRL, EOMI	  Lymphatic: No lymphadenopathy  Cardiovascular: Normal S1 S2, No JVD, + murmurs, No edema  Respiratory: Lungs clear to auscultation	  Psychiatry: A & O x 3, Mood & affect appropriate  Gastrointestinal:  Soft, Non-tender, + BS	  Skin: No rashes, No ecchymoses, No cyanosis	  Neurologic: Non-focal  Extremities: Normal range of motion, No clubbing, cyanosis or edema  Vascular: Peripheral pulses palpable 2+ bilaterally    MEDICATIONS  (STANDING):  aspirin enteric coated 81 milliGRAM(s) Oral daily  atorvastatin 10 milliGRAM(s) Oral at bedtime  dextrose 40% Gel 15 Gram(s) Oral once  dextrose 5% + sodium chloride 0.45%. 1000 milliLiter(s) (70 mL/Hr) IV Continuous <Continuous>  dextrose 5%. 1000 milliLiter(s) (100 mL/Hr) IV Continuous <Continuous>  dextrose 5%. 1000 milliLiter(s) (50 mL/Hr) IV Continuous <Continuous>  dextrose 50% Injectable 25 Gram(s) IV Push once  dextrose 50% Injectable 12.5 Gram(s) IV Push once  dextrose 50% Injectable 25 Gram(s) IV Push once  glucagon  Injectable 1 milliGRAM(s) IntraMuscular once  heparin   Injectable 5000 Unit(s) SubCutaneous every 12 hours  insulin glargine Injectable (LANTUS) 18 Unit(s) SubCutaneous at bedtime  insulin lispro (ADMELOG) corrective regimen sliding scale   SubCutaneous three times a day before meals  insulin lispro (ADMELOG) corrective regimen sliding scale   SubCutaneous at bedtime  insulin lispro Injectable (ADMELOG) 4 Unit(s) SubCutaneous three times a day before meals  losartan 50 milliGRAM(s) Oral daily  potassium chloride    Tablet ER 40 milliEquivalent(s) Oral every 4 hours      TELEMETRY: 	    ECG:  	  RADIOLOGY:  OTHER: 	  	  LABS:	 	    CARDIAC MARKERS:            02-23    147<H>  |  109<H>  |  13  ----------------------------<  136<H>  3.2<L>   |  25  |  1.05    Ca    9.1      23 Feb 2022 05:47      proBNP:   Lipid Profile: Cholesterol 196  LDL --  HDL 69  TG 84  Cholesterol 217  LDL --  HDL 76  TG 92    HgA1c:   TSH: Thyroid Stimulating Hormone, Serum: 0.76 uIU/mL (02-21 @ 09:06)    < from: Transthoracic Echocardiogram (02.22.22 @ 06:55) >  Mitral Valve: Normal mitral valve.There is redundant  chordae. Minimal mitral regurgitation.  Aortic Valve/Aorta: Normal trileaflet aortic valve. No  aortic valve regurgitation seen.  Normal aortic root size. (Ao: 3.3 cm at the sinuses of  Valsalva).  Left Atrium: Normal left atrium.  LA volume index = 13  cc/m2.  Left Ventricle: Normal left ventricular systolic function.  LVEF using biplane Marroquin's is 71%. No segmental wall  motion abnormalities. There is a sigmoid septum measuring  1.7cm. Normal left ventricular internal dimensions and wall  thicknesses. Normal diastolic function  Right Heart: Normal right atrium. Normal right ventricular  size and function. Normal tricuspid valve. Mild tricuspid  regurgitation. Normal pulmonic valve. Minimal pulmonic  regurgitation.  Pericardium/Pleura: No pericardial effusion seen.  Hemodynamic: Estimated right ventricular systolic pressure  equals 28 mm Hg, assuming right atrial pressure equals 3 mm  Hg, consistent with normal pulmonary pressures.  ------------------------------------------------------------------------  Conclusions:  1. Normal left ventricular systolic function. LVEF using  biplane Marroquin's is 71%. No segmental wall motion  abnormalities. There is a sigmoid septum measuring  1.7cm.Normal diastolic function  2. Normal right atrium. Normal right ventricular size and  function.Normal tricuspid valve. Mild tricuspid  regurgitation.Estimated pulmonary artery systolic pressure  equals 28 mm Hg, assuming right atrial pressure equals 3  mm Hg, consistent with normal pulmonary pressures.  3. No pericardial effusion seen.        < from: 12 Lead ECG (02.21.22 @ 09:09) >  Diagnosis Line NORMAL SINUS RHYTHM  NONSPECIFIC ST AND T WAVE ABNORMALITY  ABNORMAL ECG  WHEN COMPARED WITH ECG OF 19-FEB-2022 20:06, (UNCONFIRMED)  NO SIGNIFICANT CHANGE WAS FOUND        Assessment and plan  ---------------------------  72 yo M no PMH with 1 week lethargy, weakness, fatigue, polyuria, polydipsia.Went to PMD who diagnosed with htn and DM. Rx'd him with medication which  not started yet.   Symptoms worsened today so sent here.  .   No abdominal pain, no fever, no chest pain, no sob no dysuria. Endorses chills, transient blurred vision yesterday and chronic b/l LE paresthesias.   pt with new onset of DM with trop of 19, ?cause of sudden increase blood sugar  check cultures  fu cardiac enzymes  ecg noted no sig changes  agree with echo  BL parasthenia ,cs spine x ray  hypernatremia, hydration  check lipid panel  increase bp/ dm add ace or ARB, change losartan to 100 mg daily if bp remains elevated  echo results noted  dvt prophylaxis  plan for  stress test as out pt    	        
    afberile  REVIEW OF SYSTEMS:  GEN: no fever,    no chills  RESP: no SOB,   no cough  CVS: no chest pain,   no palpitations  GI: no abdominal pain,   no nausea,   no vomiting,   no constipation,   no diarrhea  : no dysuria,   no frequency  NEURO: no headache,   no dizziness  PSYCH: no depression,   not anxious  Derm : no rash    MEDICATIONS  (STANDING):  aspirin enteric coated 81 milliGRAM(s) Oral daily  atorvastatin 10 milliGRAM(s) Oral at bedtime  dextrose 40% Gel 15 Gram(s) Oral once  dextrose 5% + sodium chloride 0.45%. 1000 milliLiter(s) (70 mL/Hr) IV Continuous <Continuous>  dextrose 5%. 1000 milliLiter(s) (100 mL/Hr) IV Continuous <Continuous>  dextrose 5%. 1000 milliLiter(s) (50 mL/Hr) IV Continuous <Continuous>  dextrose 50% Injectable 25 Gram(s) IV Push once  dextrose 50% Injectable 12.5 Gram(s) IV Push once  dextrose 50% Injectable 25 Gram(s) IV Push once  glucagon  Injectable 1 milliGRAM(s) IntraMuscular once  heparin   Injectable 5000 Unit(s) SubCutaneous every 12 hours  insulin glargine Injectable (LANTUS) 18 Unit(s) SubCutaneous at bedtime  insulin lispro (ADMELOG) corrective regimen sliding scale   SubCutaneous three times a day before meals  insulin lispro (ADMELOG) corrective regimen sliding scale   SubCutaneous at bedtime  insulin lispro Injectable (ADMELOG) 4 Unit(s) SubCutaneous three times a day before meals  losartan 50 milliGRAM(s) Oral daily  potassium chloride    Tablet ER 40 milliEquivalent(s) Oral every 4 hours    MEDICATIONS  (PRN):  acetaminophen     Tablet .. 650 milliGRAM(s) Oral every 6 hours PRN Temp greater or equal to 38C (100.4F), Mild Pain (1 - 3)  melatonin 3 milliGRAM(s) Oral at bedtime PRN Insomnia      Vital Signs Last 24 Hrs  T(C): 36.6 (2022 08:19), Max: 36.8 (2022 15:07)  T(F): 97.8 (2022 08:19), Max: 98.3 (2022 15:07)  HR: 70 (2022 08:19) (61 - 78)  BP: 161/93 (2022 08:19) (154/84 - 178/92)  BP(mean): --  RR: 18 (2022 08:19) (18 - 19)  SpO2: 98% (2022 08:19) (98% - 100%)  CAPILLARY BLOOD GLUCOSE      POCT Blood Glucose.: 193 mg/dL (2022 07:56)  POCT Blood Glucose.: 141 mg/dL (2022 21:06)  POCT Blood Glucose.: 200 mg/dL (2022 17:05)    I&O's Summary    2022 07:01  -  2022 07:00  --------------------------------------------------------  IN: 150 mL / OUT: 1250 mL / NET: -1100 mL    2022 07:01  -  2022 11:21  --------------------------------------------------------  IN: 0 mL / OUT: 300 mL / NET: -300 mL        PHYSICAL EXAM:  HEAD:  Atraumatic, Normocephalic  NECK: Supple, No   JVD  CHEST/LUNG:   no     rales,     no,    rhonchi  HEART: Regular rate and rhythm;         murmur  ABDOMEN: Soft, Nontender, ;   EXTREMITIES:   no     edema  NEUROLOGY:  alert    LABS:                        16.6   7.51  )-----------( 203      ( 2022 06:23 )             51.5     02-21    150<H>  |  110<H>  |  13  ----------------------------<  209<H>  3.1<L>   |  25  |  0.95    Ca    9.5      2022 06:21  Phos  2.8     02-20  Mg     2.1     02-20    TPro  7.7  /  Alb  4.3  /  TBili  0.9  /  DBili  x   /  AST  18  /  ALT  20  /  AlkPhos  88      PT/INR - ( 2022 20:44 )   PT: 12.6 sec;   INR: 1.05 ratio         PTT - ( 2022 20:44 )  PTT:43.1 sec      Urinalysis Basic - ( 2022 21:40 )    Color: Light Yellow / Appearance: Clear / S.011 / pH: x  Gluc: x / Ketone: Small  / Bili: Negative / Urobili: Negative   Blood: x / Protein: Negative / Nitrite: Negative   Leuk Esterase: Negative / RBC: x / WBC x   Sq Epi: x / Non Sq Epi: x / Bacteria: x           @ 20:43  3.2  32      Thyroid Stimulating Hormone, Serum: 0.76 uIU/mL ( @ 09:06)          Consultant(s) Notes Reviewed:      Care Discussed with Consultants/Other Providers:    
           CARDIOLOGY     PROGRESS  NOTE   ________________________________________________    CHIEF COMPLAINT:Patient is a 71y old  Male who presents with a chief complaint of weakness (24 Feb 2022 08:02)  doing well.  	  REVIEW OF SYSTEMS:  CONSTITUTIONAL: No fever, weight loss, or fatigue  EYES: No eye pain, visual disturbances, or discharge  ENT:  No difficulty hearing, tinnitus, vertigo; No sinus or throat pain  NECK: No pain or stiffness  RESPIRATORY: No cough, wheezing, chills or hemoptysis; No Shortness of Breath  CARDIOVASCULAR: No chest pain, palpitations, passing out, dizziness, or leg swelling  GASTROINTESTINAL: No abdominal or epigastric pain. No nausea, vomiting, or hematemesis; No diarrhea or constipation. No melena or hematochezia.  GENITOURINARY: No dysuria, frequency, hematuria, or incontinence  NEUROLOGICAL: No headaches, memory loss, loss of strength, numbness, or tremors  SKIN: No itching, burning, rashes, or lesions   LYMPH Nodes: No enlarged glands  ENDOCRINE: No heat or cold intolerance; No hair loss  MUSCULOSKELETAL: No joint pain or swelling; No muscle, back, or extremity pain  PSYCHIATRIC: No depression, anxiety, mood swings, or difficulty sleeping  HEME/LYMPH: No easy bruising, or bleeding gums  ALLERGY AND IMMUNOLOGIC: No hives or eczema	    [ ] All others negative	  [ ] Unable to obtain    PHYSICAL EXAM:  T(C): 36.6 (02-24-22 @ 08:47), Max: 36.7 (02-23-22 @ 23:24)  HR: 60 (02-24-22 @ 08:47) (59 - 62)  BP: 147/85 (02-24-22 @ 08:47) (125/71 - 158/85)  RR: 18 (02-24-22 @ 08:47) (18 - 18)  SpO2: 97% (02-24-22 @ 08:47) (97% - 98%)  Wt(kg): --  I&O's Summary    23 Feb 2022 07:01  -  24 Feb 2022 07:00  --------------------------------------------------------  IN: 0 mL / OUT: 600 mL / NET: -600 mL        Appearance: Normal	  HEENT:   Normal oral mucosa, PERRL, EOMI	  Lymphatic: No lymphadenopathy  Cardiovascular: Normal S1 S2, No JVD, + murmurs, No edema  Respiratory: Lungs clear to auscultation	  Psychiatry: A & O x 3, Mood & affect appropriate  Gastrointestinal:  Soft, Non-tender, + BS	  Skin: No rashes, No ecchymoses, No cyanosis	  Neurologic: Non-focal  Extremities: Normal range of motion, No clubbing, cyanosis or edema  Vascular: Peripheral pulses palpable 2+ bilaterally    MEDICATIONS  (STANDING):  aspirin enteric coated 81 milliGRAM(s) Oral daily  atorvastatin 10 milliGRAM(s) Oral at bedtime  dextrose 40% Gel 15 Gram(s) Oral once  dextrose 5%. 1000 milliLiter(s) (100 mL/Hr) IV Continuous <Continuous>  dextrose 5%. 1000 milliLiter(s) (50 mL/Hr) IV Continuous <Continuous>  dextrose 5%. 1000 milliLiter(s) (60 mL/Hr) IV Continuous <Continuous>  dextrose 50% Injectable 25 Gram(s) IV Push once  dextrose 50% Injectable 12.5 Gram(s) IV Push once  dextrose 50% Injectable 25 Gram(s) IV Push once  glucagon  Injectable 1 milliGRAM(s) IntraMuscular once  heparin   Injectable 5000 Unit(s) SubCutaneous every 12 hours  insulin glargine Injectable (LANTUS) 18 Unit(s) SubCutaneous at bedtime  insulin lispro (ADMELOG) corrective regimen sliding scale   SubCutaneous three times a day before meals  insulin lispro (ADMELOG) corrective regimen sliding scale   SubCutaneous at bedtime  insulin lispro Injectable (ADMELOG) 4 Unit(s) SubCutaneous three times a day before meals  losartan 50 milliGRAM(s) Oral daily      TELEMETRY: 	    ECG:  	  RADIOLOGY:  OTHER: 	  	  LABS:	 	    CARDIAC MARKERS:            02-23    147<H>  |  109<H>  |  13  ----------------------------<  136<H>  3.2<L>   |  25  |  1.05    Ca    9.1      23 Feb 2022 05:47      proBNP:   Lipid Profile: Cholesterol 196  LDL --  HDL 69  TG 84  Cholesterol 217  LDL --  HDL 76  TG 92    HgA1c:   TSH: Thyroid Stimulating Hormone, Serum: 0.76 uIU/mL (02-21 @ 09:06)          Assessment and plan  ---------------------------  70 yo M no PMH with 1 week lethargy, weakness, fatigue, polyuria, polydipsia.Went to PMD who diagnosed with htn and DM. Rx'd him with medication which  not started yet.   Symptoms worsened today so sent here.  .   No abdominal pain, no fever, no chest pain, no sob no dysuria. Endorses chills, transient blurred vision yesterday and chronic b/l LE paresthesias.   pt with new onset of DM with trop of 19, ?cause of sudden increase blood sugar  check cultures  fu cardiac enzymes  ecg noted no sig changes  agree with echo  BL parasthenia ,cs spine x ray  hypernatremia, hydration  check lipid panel  increase bp/ dm add ace or ARB, change losartan to 100 mg daily if bp remains elevated  echo results noted  dvt prophylaxis  plan for  stress test as out pt  will increase losartan if bp elevated  increase lipitor to 20 mg qhs, fu lft    	        
           CARDIOLOGY     PROGRESS  NOTE   ________________________________________________    CHIEF COMPLAINT:Patient is a 71y old  Male who presents with a chief complaint of weakness (20 Feb 2022 15:49)  doing bettter.  	  REVIEW OF SYSTEMS:  CONSTITUTIONAL: No fever, weight loss, or fatigue  EYES: No eye pain, visual disturbances, or discharge  ENT:  No difficulty hearing, tinnitus, vertigo; No sinus or throat pain  NECK: No pain or stiffness  RESPIRATORY: No cough, wheezing, chills or hemoptysis; No Shortness of Breath  CARDIOVASCULAR: No chest pain, palpitations, passing out, dizziness, or leg swelling  GASTROINTESTINAL: No abdominal or epigastric pain. No nausea, vomiting, or hematemesis; No diarrhea or constipation. No melena or hematochezia.  GENITOURINARY: No dysuria, frequency, hematuria, or incontinence  NEUROLOGICAL: No headaches, memory loss, loss of strength, numbness, or tremors  SKIN: No itching, burning, rashes, or lesions   LYMPH Nodes: No enlarged glands  ENDOCRINE: No heat or cold intolerance; No hair loss  MUSCULOSKELETAL: No joint pain or swelling; No muscle, back, or extremity pain  PSYCHIATRIC: No depression, anxiety, mood swings, or difficulty sleeping  HEME/LYMPH: No easy bruising, or bleeding gums  ALLERGY AND IMMUNOLOGIC: No hives or eczema	    [ ] All others negative	  [x ] Unable to obtain    PHYSICAL EXAM:  T(C): 36.6 (02-21-22 @ 08:19), Max: 36.8 (02-20-22 @ 15:07)  HR: 70 (02-21-22 @ 08:19) (61 - 78)  BP: 161/93 (02-21-22 @ 08:19) (154/84 - 178/92)  RR: 18 (02-21-22 @ 08:19) (18 - 19)  SpO2: 98% (02-21-22 @ 08:19) (98% - 100%)  Wt(kg): --  I&O's Summary    20 Feb 2022 07:01  -  21 Feb 2022 07:00  --------------------------------------------------------  IN: 150 mL / OUT: 1250 mL / NET: -1100 mL        Appearance: Normal	  HEENT:   Normal oral mucosa, PERRL, EOMI	  Lymphatic: No lymphadenopathy  Cardiovascular: Normal S1 S2, No JVD, + murmurs, No edema  Respiratory: rhonchi  Gastrointestinal:  Soft, Non-tender, + BS	  Skin: No rashes, No ecchymoses, No cyanosis	  Neurologic: Non-focal  Extremities: Normal range of motion, No clubbing, cyanosis or edema  Vascular: Peripheral pulses palpable 2+ bilaterally    MEDICATIONS  (STANDING):  aspirin enteric coated 81 milliGRAM(s) Oral daily  atorvastatin 10 milliGRAM(s) Oral at bedtime  dextrose 40% Gel 15 Gram(s) Oral once  dextrose 5% + sodium chloride 0.45%. 1000 milliLiter(s) (70 mL/Hr) IV Continuous <Continuous>  dextrose 5%. 1000 milliLiter(s) (100 mL/Hr) IV Continuous <Continuous>  dextrose 5%. 1000 milliLiter(s) (50 mL/Hr) IV Continuous <Continuous>  dextrose 50% Injectable 25 Gram(s) IV Push once  dextrose 50% Injectable 12.5 Gram(s) IV Push once  dextrose 50% Injectable 25 Gram(s) IV Push once  glucagon  Injectable 1 milliGRAM(s) IntraMuscular once  heparin   Injectable 5000 Unit(s) SubCutaneous every 12 hours  insulin glargine Injectable (LANTUS) 14 Unit(s) SubCutaneous at bedtime  insulin lispro (ADMELOG) corrective regimen sliding scale   SubCutaneous three times a day before meals  insulin lispro (ADMELOG) corrective regimen sliding scale   SubCutaneous at bedtime  insulin lispro Injectable (ADMELOG) 4 Unit(s) SubCutaneous three times a day before meals  losartan 25 milliGRAM(s) Oral every 12 hours  potassium chloride    Tablet ER 40 milliEquivalent(s) Oral every 4 hours      TELEMETRY: 	    ECG:  	  RADIOLOGY:  OTHER: 	  	  LABS:	 	    CARDIAC MARKERS:                                16.6   7.51  )-----------( 203      ( 21 Feb 2022 06:23 )             51.5     02-21    150<H>  |  110<H>  |  13  ----------------------------<  209<H>  3.1<L>   |  25  |  0.95    Ca    9.5      21 Feb 2022 06:21  Phos  2.8     02-20  Mg     2.1     02-20    TPro  7.7  /  Alb  4.3  /  TBili  0.9  /  DBili  x   /  AST  18  /  ALT  20  /  AlkPhos  88  02-19    proBNP:   Lipid Profile:   HgA1c:   TSH:   PT/INR - ( 19 Feb 2022 20:44 )   PT: 12.6 sec;   INR: 1.05 ratio         PTT - ( 19 Feb 2022 20:44 )  PTT:43.1 sec  Will increase Lantus to 14u at bedtime.  Will start Admelog 4u before each meal and continue Admelog correction scale coverage. Will continue monitoring FS and FU. Will FU DC recommendations.  Patient counseled for compliance with consistent low carb diet and exercise as tolerated outpatient.    Assessment and plan  ---------------------------  72 yo M no PMH with 1 week lethargy, weakness, fatigue, polyuria, polydipsia.Went to PMD who diagnosed with htn and DM. Rx'd him with medication which  not started yet.   Symptoms worsened today so sent here.  .   No abdominal pain, no fever, no chest pain, no sob no dysuria. Endorses chills, transient blurred vision yesterday and chronic b/l LE paresthesias.   pt with new onset of DM with trop of 19, ?cause of sudden increase blood sugar  check cultures  fu cardiac enzymes  awaiting ecg  agree with echo  BL parasthenia ,cs spine x ray  hypernatremia, hydration  check lipid panel  increase bp/ dm add ace or ARB, change losartan to 100 mg daily, sig elevated bp  echo  dvt prophylaxis    	        
           CARDIOLOGY     PROGRESS  NOTE   ________________________________________________    CHIEF COMPLAINT:Patient is a 71y old  Male who presents with a chief complaint of weakness (21 Feb 2022 15:23)  no complai+  	  REVIEW OF SYSTEMS:  CONSTITUTIONAL: No fever, weight loss, or fatigue  EYES: No eye pain, visual disturbances, or discharge  ENT:  No difficulty hearing, tinnitus, vertigo; No sinus or throat pain  NECK: No pain or stiffness  RESPIRATORY: No cough, wheezing, chills or hemoptysis; No Shortness of Breath  CARDIOVASCULAR: No chest pain, palpitations, passing out, dizziness, or leg swelling  GASTROINTESTINAL: No abdominal or epigastric pain. No nausea, vomiting, or hematemesis; No diarrhea or constipation. No melena or hematochezia.  GENITOURINARY: No dysuria, frequency, hematuria, or incontinence  NEUROLOGICAL: No headaches, memory loss, loss of strength, numbness, or tremors  SKIN: No itching, burning, rashes, or lesions   LYMPH Nodes: No enlarged glands  ENDOCRINE: No heat or cold intolerance; No hair loss  MUSCULOSKELETAL: No joint pain or swelling; No muscle, back, or extremity pain  PSYCHIATRIC: No depression, anxiety, mood swings, or difficulty sleeping  HEME/LYMPH: No easy bruising, or bleeding gums  ALLERGY AND IMMUNOLOGIC: No hives or eczema	    [ ] All others negative	  [ ] Unable to obtain    PHYSICAL EXAM:  T(C): 36.8 (02-22-22 @ 07:56), Max: 36.9 (02-21-22 @ 20:13)  HR: 64 (02-22-22 @ 07:56) (59 - 74)  BP: 133/80 (02-22-22 @ 07:56) (133/80 - 161/90)  RR: 18 (02-22-22 @ 07:56) (18 - 18)  SpO2: 99% (02-22-22 @ 07:56) (96% - 100%)  Wt(kg): --  I&O's Summary    21 Feb 2022 07:01  -  22 Feb 2022 07:00  --------------------------------------------------------  IN: 1160 mL / OUT: 2225 mL / NET: -1065 mL    22 Feb 2022 07:01  -  22 Feb 2022 08:49  --------------------------------------------------------  IN: 0 mL / OUT: 200 mL / NET: -200 mL        Appearance: Normal	  HEENT:   Normal oral mucosa, PERRL, EOMI	  Lymphatic: No lymphadenopathy  Cardiovascular: Normal S1 S2, No JVD, + murmurs, No edema  Respiratory: rhonchi  Psychiatry: A & O x 3, Mood & affect appropriate  Gastrointestinal:  Soft, Non-tender, + BS	  Skin: No rashes, No ecchymoses, No cyanosis	  Neurologic: Non-focal  Extremities: Normal range of motion, No clubbing, cyanosis or edema  Vascular: Peripheral pulses palpable 2+ bilaterally    MEDICATIONS  (STANDING):  aspirin enteric coated 81 milliGRAM(s) Oral daily  atorvastatin 10 milliGRAM(s) Oral at bedtime  dextrose 40% Gel 15 Gram(s) Oral once  dextrose 5% + sodium chloride 0.45%. 1000 milliLiter(s) (70 mL/Hr) IV Continuous <Continuous>  dextrose 5%. 1000 milliLiter(s) (100 mL/Hr) IV Continuous <Continuous>  dextrose 5%. 1000 milliLiter(s) (50 mL/Hr) IV Continuous <Continuous>  dextrose 50% Injectable 25 Gram(s) IV Push once  dextrose 50% Injectable 12.5 Gram(s) IV Push once  dextrose 50% Injectable 25 Gram(s) IV Push once  glucagon  Injectable 1 milliGRAM(s) IntraMuscular once  heparin   Injectable 5000 Unit(s) SubCutaneous every 12 hours  insulin glargine Injectable (LANTUS) 18 Unit(s) SubCutaneous at bedtime  insulin lispro (ADMELOG) corrective regimen sliding scale   SubCutaneous three times a day before meals  insulin lispro (ADMELOG) corrective regimen sliding scale   SubCutaneous at bedtime  insulin lispro Injectable (ADMELOG) 4 Unit(s) SubCutaneous three times a day before meals  losartan 50 milliGRAM(s) Oral daily      TELEMETRY: 	    ECG:  	  RADIOLOGY:  OTHER: 	  	  LABS:	 	    CARDIAC MARKERS:                                16.6   7.51  )-----------( 203      ( 21 Feb 2022 06:23 )             51.5     02-22    147<H>  |  110<H>  |  13  ----------------------------<  126<H>  3.4<L>   |  25  |  1.07    Ca    9.3      22 Feb 2022 06:06      proBNP:   Lipid Profile: Cholesterol 217  LDL --  HDL 76  TG 92    HgA1c:   TSH: Thyroid Stimulating Hormone, Serum: 0.76 uIU/mL (02-21 @ 09:06)    < from: 12 Lead ECG (02.21.22 @ 09:09) >  Diagnosis Line NORMAL SINUS RHYTHM  NONSPECIFIC ST AND T WAVE ABNORMALITY  ABNORMAL ECG  WHEN COMPARED WITH ECG OF 19-FEB-2022 20:06, (UNCONFIRMED)  NO SIGNIFICANT CHANGE WAS FOUND      Assessment and plan  ---------------------------  70 yo M no PMH with 1 week lethargy, weakness, fatigue, polyuria, polydipsia.Went to PMD who diagnosed with htn and DM. Rx'd him with medication which  not started yet.   Symptoms worsened today so sent here.  .   No abdominal pain, no fever, no chest pain, no sob no dysuria. Endorses chills, transient blurred vision yesterday and chronic b/l LE paresthesias.   pt with new onset of DM with trop of 19, ?cause of sudden increase blood sugar  check cultures  fu cardiac enzymes  ecg noted no sig changes  agree with echo  BL parasthenia ,cs spine x ray  hypernatremia, hydration  check lipid panel  increase bp/ dm add ace or ARB, change losartan to 100 mg daily if bp remains elevated  echo  dvt prophylaxis    	        
    afberile  REVIEW OF SYSTEMS:  GEN: no fever,    no chills  RESP: no SOB,   no cough  CVS: no chest pain,   no palpitations  GI: no abdominal pain,   no nausea,   no vomiting,   no constipation,   no diarrhea  : no dysuria,   no frequency  NEURO: no headache,   no dizziness  PSYCH: no depression,   not anxious  Derm : no rash    MEDICATIONS  (STANDING):  aspirin enteric coated 81 milliGRAM(s) Oral daily  atorvastatin 10 milliGRAM(s) Oral at bedtime  dextrose 40% Gel 15 Gram(s) Oral once  dextrose 5% + sodium chloride 0.45%. 1000 milliLiter(s) (70 mL/Hr) IV Continuous <Continuous>  dextrose 5%. 1000 milliLiter(s) (100 mL/Hr) IV Continuous <Continuous>  dextrose 5%. 1000 milliLiter(s) (50 mL/Hr) IV Continuous <Continuous>  dextrose 50% Injectable 25 Gram(s) IV Push once  dextrose 50% Injectable 12.5 Gram(s) IV Push once  dextrose 50% Injectable 25 Gram(s) IV Push once  glucagon  Injectable 1 milliGRAM(s) IntraMuscular once  heparin   Injectable 5000 Unit(s) SubCutaneous every 12 hours  insulin glargine Injectable (LANTUS) 18 Unit(s) SubCutaneous at bedtime  insulin lispro (ADMELOG) corrective regimen sliding scale   SubCutaneous three times a day before meals  insulin lispro (ADMELOG) corrective regimen sliding scale   SubCutaneous at bedtime  insulin lispro Injectable (ADMELOG) 4 Unit(s) SubCutaneous three times a day before meals  losartan 50 milliGRAM(s) Oral daily  potassium chloride    Tablet ER 20 milliEquivalent(s) Oral once    MEDICATIONS  (PRN):  acetaminophen     Tablet .. 650 milliGRAM(s) Oral every 6 hours PRN Temp greater or equal to 38C (100.4F), Mild Pain (1 - 3)  melatonin 3 milliGRAM(s) Oral at bedtime PRN Insomnia      Vital Signs Last 24 Hrs  T(C): 36.8 (22 Feb 2022 07:56), Max: 36.9 (21 Feb 2022 20:13)  T(F): 98.2 (22 Feb 2022 07:56), Max: 98.4 (21 Feb 2022 20:13)  HR: 64 (22 Feb 2022 07:56) (59 - 74)  BP: 133/80 (22 Feb 2022 07:56) (133/80 - 161/90)  BP(mean): --  RR: 18 (22 Feb 2022 07:56) (18 - 18)  SpO2: 99% (22 Feb 2022 07:56) (96% - 100%)  CAPILLARY BLOOD GLUCOSE      POCT Blood Glucose.: 132 mg/dL (22 Feb 2022 08:58)  POCT Blood Glucose.: 108 mg/dL (21 Feb 2022 21:21)  POCT Blood Glucose.: 190 mg/dL (21 Feb 2022 16:18)  POCT Blood Glucose.: 130 mg/dL (21 Feb 2022 11:27)    I&O's Summary    21 Feb 2022 07:01  -  22 Feb 2022 07:00  --------------------------------------------------------  IN: 1160 mL / OUT: 2225 mL / NET: -1065 mL    22 Feb 2022 07:01  -  22 Feb 2022 10:25  --------------------------------------------------------  IN: 0 mL / OUT: 200 mL / NET: -200 mL        PHYSICAL EXAM:  HEAD:  Atraumatic, Normocephalic  NECK: Supple, No   JVD  CHEST/LUNG:   no     rales,     no,    rhonchi  HEART: Regular rate and rhythm;         murmur  ABDOMEN: Soft, Nontender, ;   EXTREMITIES:     no   edema  NEUROLOGY:  alert    LABS:                        16.6   7.51  )-----------( 203      ( 21 Feb 2022 06:23 )             51.5     02-22    147<H>  |  110<H>  |  13  ----------------------------<  126<H>  3.4<L>   |  25  |  1.07    Ca    9.3      22 Feb 2022 06:06                      Thyroid Stimulating Hormone, Serum: 0.76 uIU/mL (02-21 @ 09:06)          Consultant(s) Notes Reviewed:      Care Discussed with Consultants/Other Providers:    
  afebriel    REVIEW OF SYSTEMS:  GEN: no fever,    no chills  RESP: no SOB,   no cough  CVS: no chest pain,   no palpitations  GI: no abdominal pain,   no nausea,   no vomiting,   no constipation,   no diarrhea  : no dysuria,   no frequency  NEURO: no headache,   no dizziness  PSYCH: no depression,   not anxious  Derm : no rash    MEDICATIONS  (STANDING):  aspirin enteric coated 81 milliGRAM(s) Oral daily  atorvastatin 10 milliGRAM(s) Oral at bedtime  dextrose 40% Gel 15 Gram(s) Oral once  dextrose 5%. 1000 milliLiter(s) (100 mL/Hr) IV Continuous <Continuous>  dextrose 5%. 1000 milliLiter(s) (50 mL/Hr) IV Continuous <Continuous>  dextrose 5%. 1000 milliLiter(s) (60 mL/Hr) IV Continuous <Continuous>  dextrose 50% Injectable 25 Gram(s) IV Push once  dextrose 50% Injectable 12.5 Gram(s) IV Push once  dextrose 50% Injectable 25 Gram(s) IV Push once  glucagon  Injectable 1 milliGRAM(s) IntraMuscular once  heparin   Injectable 5000 Unit(s) SubCutaneous every 12 hours  insulin glargine Injectable (LANTUS) 18 Unit(s) SubCutaneous at bedtime  insulin lispro (ADMELOG) corrective regimen sliding scale   SubCutaneous three times a day before meals  insulin lispro (ADMELOG) corrective regimen sliding scale   SubCutaneous at bedtime  insulin lispro Injectable (ADMELOG) 4 Unit(s) SubCutaneous three times a day before meals  losartan 50 milliGRAM(s) Oral daily  potassium chloride    Tablet ER 40 milliEquivalent(s) Oral every 4 hours    MEDICATIONS  (PRN):  acetaminophen     Tablet .. 650 milliGRAM(s) Oral every 6 hours PRN Temp greater or equal to 38C (100.4F), Mild Pain (1 - 3)  melatonin 3 milliGRAM(s) Oral at bedtime PRN Insomnia      Vital Signs Last 24 Hrs  T(C): 36.8 (23 Feb 2022 08:48), Max: 36.9 (22 Feb 2022 19:50)  T(F): 98.3 (23 Feb 2022 08:48), Max: 98.4 (22 Feb 2022 19:50)  HR: 60 (23 Feb 2022 08:48) (57 - 69)  BP: 142/75 (23 Feb 2022 08:48) (129/85 - 163/93)  BP(mean): --  RR: 18 (23 Feb 2022 08:48) (18 - 18)  SpO2: 98% (23 Feb 2022 08:48) (98% - 99%)  CAPILLARY BLOOD GLUCOSE      POCT Blood Glucose.: 218 mg/dL (23 Feb 2022 07:57)  POCT Blood Glucose.: 133 mg/dL (22 Feb 2022 21:44)  POCT Blood Glucose.: 87 mg/dL (22 Feb 2022 16:17)  POCT Blood Glucose.: 140 mg/dL (22 Feb 2022 11:41)    I&O's Summary    22 Feb 2022 07:01  -  23 Feb 2022 07:00  --------------------------------------------------------  IN: 1560 mL / OUT: 1925 mL / NET: -365 mL        PHYSICAL EXAM:  HEAD:  Atraumatic, Normocephalic  NECK: Supple, No   JVD  CHEST/LUNG:   no     rales,     no,    rhonchi  HEART: Regular rate and rhythm;         murmur  ABDOMEN: Soft, Nontender, ;   EXTREMITIES:   no     edema  NEUROLOGY:  alert    LABS:    02-23    147<H>  |  109<H>  |  13  ----------------------------<  136<H>  3.2<L>   |  25  |  1.05    Ca    9.1      23 Feb 2022 05:47                      Thyroid Stimulating Hormone, Serum: 0.76 uIU/mL (02-21 @ 09:06)          Consultant(s) Notes Reviewed:      Care Discussed with Consultants/Other Providers:    
  afebrile    REVIEW OF SYSTEMS:  GEN: no fever,    no chills  RESP: no SOB,   no cough  CVS: no chest pain,   no palpitations  GI: no abdominal pain,   no nausea,   no vomiting,   no constipation,   no diarrhea  : no dysuria,   no frequency  NEURO: no headache,   no dizziness  PSYCH: no depression,   not anxious  Derm : no rash    MEDICATIONS  (STANDING):  aspirin enteric coated 81 milliGRAM(s) Oral daily  atorvastatin 10 milliGRAM(s) Oral at bedtime  dextrose 40% Gel 15 Gram(s) Oral once  dextrose 5%. 1000 milliLiter(s) (100 mL/Hr) IV Continuous <Continuous>  dextrose 5%. 1000 milliLiter(s) (50 mL/Hr) IV Continuous <Continuous>  dextrose 5%. 1000 milliLiter(s) (60 mL/Hr) IV Continuous <Continuous>  dextrose 50% Injectable 25 Gram(s) IV Push once  dextrose 50% Injectable 12.5 Gram(s) IV Push once  dextrose 50% Injectable 25 Gram(s) IV Push once  glucagon  Injectable 1 milliGRAM(s) IntraMuscular once  heparin   Injectable 5000 Unit(s) SubCutaneous every 12 hours  insulin glargine Injectable (LANTUS) 18 Unit(s) SubCutaneous at bedtime  insulin lispro (ADMELOG) corrective regimen sliding scale   SubCutaneous three times a day before meals  insulin lispro (ADMELOG) corrective regimen sliding scale   SubCutaneous at bedtime  insulin lispro Injectable (ADMELOG) 4 Unit(s) SubCutaneous three times a day before meals  losartan 50 milliGRAM(s) Oral daily    MEDICATIONS  (PRN):  acetaminophen     Tablet .. 650 milliGRAM(s) Oral every 6 hours PRN Temp greater or equal to 38C (100.4F), Mild Pain (1 - 3)  melatonin 3 milliGRAM(s) Oral at bedtime PRN Insomnia      Vital Signs Last 24 Hrs  T(C): 36.7 (23 Feb 2022 23:24), Max: 36.8 (23 Feb 2022 08:48)  T(F): 98.1 (23 Feb 2022 23:24), Max: 98.3 (23 Feb 2022 08:48)  HR: 62 (23 Feb 2022 23:24) (59 - 62)  BP: 125/71 (23 Feb 2022 23:24) (125/71 - 158/85)  BP(mean): --  RR: 18 (23 Feb 2022 23:24) (18 - 18)  SpO2: 97% (23 Feb 2022 23:24) (97% - 98%)  CAPILLARY BLOOD GLUCOSE      POCT Blood Glucose.: 153 mg/dL (24 Feb 2022 07:46)  POCT Blood Glucose.: 127 mg/dL (24 Feb 2022 05:11)  POCT Blood Glucose.: 128 mg/dL (24 Feb 2022 00:26)  POCT Blood Glucose.: 87 mg/dL (23 Feb 2022 22:18)  POCT Blood Glucose.: 103 mg/dL (23 Feb 2022 16:41)  POCT Blood Glucose.: 106 mg/dL (23 Feb 2022 11:34)    I&O's Summary    23 Feb 2022 07:01  -  24 Feb 2022 07:00  --------------------------------------------------------  IN: 0 mL / OUT: 600 mL / NET: -600 mL        PHYSICAL EXAM:  HEAD:  Atraumatic, Normocephalic  NECK: Supple, No   JVD  CHEST/LUNG:   no     rales,     no,    rhonchi  HEART: Regular rate and rhythm;         murmur  ABDOMEN: Soft, Nontender, ;   EXTREMITIES:  no      edema  NEUROLOGY:  alert    LABS:    02-23    147<H>  |  109<H>  |  13  ----------------------------<  136<H>  3.2<L>   |  25  |  1.05    Ca    9.1      23 Feb 2022 05:47                      Thyroid Stimulating Hormone, Serum: 0.76 uIU/mL (02-21 @ 09:06)          Consultant(s) Notes Reviewed:      Care Discussed with Consultants/Other Providers:    
    Chief complaint  Patient is a 71y old  Male who presents with a chief complaint of weakness (21 Feb 2022 11:21)   Review of systems  Patient awake in chair, well-appearing, no hypoglycemic episodes.    Labs and Fingersticks  CAPILLARY BLOOD GLUCOSE      POCT Blood Glucose.: 130 mg/dL (21 Feb 2022 11:27)  POCT Blood Glucose.: 193 mg/dL (21 Feb 2022 07:56)  POCT Blood Glucose.: 141 mg/dL (20 Feb 2022 21:06)  POCT Blood Glucose.: 200 mg/dL (20 Feb 2022 17:05)      Anion Gap, Serum: 15 (02-21 @ 06:21)  Anion Gap, Serum: 15 (02-20 @ 05:11)  Anion Gap, Serum: 15 (02-20 @ 00:37)  Anion Gap, Serum: 13 (02-19 @ 23:12)  Anion Gap, Serum: 14 (02-19 @ 20:44)      Calcium, Total Serum: 9.5 (02-21 @ 06:21)  Calcium, Total Serum: 9.1 (02-20 @ 05:11)  Calcium, Total Serum: 9.3 (02-20 @ 00:37)  Calcium, Total Serum: 9.2 (02-19 @ 23:12)  Calcium, Total Serum: 9.9 (02-19 @ 20:44)  Albumin, Serum: 4.3 (02-19 @ 20:44)    Alanine Aminotransferase (ALT/SGPT): 20 (02-19 @ 20:44)  Alkaline Phosphatase, Serum: 88 (02-19 @ 20:44)  Aspartate Aminotransferase (AST/SGOT): 18 (02-19 @ 20:44)        02-21    150<H>  |  110<H>  |  13  ----------------------------<  209<H>  3.1<L>   |  25  |  0.95    Ca    9.5      21 Feb 2022 06:21  Phos  2.8     02-20  Mg     2.1     02-20    TPro  7.7  /  Alb  4.3  /  TBili  0.9  /  DBili  x   /  AST  18  /  ALT  20  /  AlkPhos  88  02-19                        16.6   7.51  )-----------( 203      ( 21 Feb 2022 06:23 )             51.5     Medications  MEDICATIONS  (STANDING):  aspirin enteric coated 81 milliGRAM(s) Oral daily  atorvastatin 10 milliGRAM(s) Oral at bedtime  dextrose 40% Gel 15 Gram(s) Oral once  dextrose 5% + sodium chloride 0.45%. 1000 milliLiter(s) (70 mL/Hr) IV Continuous <Continuous>  dextrose 5%. 1000 milliLiter(s) (100 mL/Hr) IV Continuous <Continuous>  dextrose 5%. 1000 milliLiter(s) (50 mL/Hr) IV Continuous <Continuous>  dextrose 50% Injectable 25 Gram(s) IV Push once  dextrose 50% Injectable 12.5 Gram(s) IV Push once  dextrose 50% Injectable 25 Gram(s) IV Push once  glucagon  Injectable 1 milliGRAM(s) IntraMuscular once  heparin   Injectable 5000 Unit(s) SubCutaneous every 12 hours  insulin glargine Injectable (LANTUS) 18 Unit(s) SubCutaneous at bedtime  insulin lispro (ADMELOG) corrective regimen sliding scale   SubCutaneous three times a day before meals  insulin lispro (ADMELOG) corrective regimen sliding scale   SubCutaneous at bedtime  insulin lispro Injectable (ADMELOG) 4 Unit(s) SubCutaneous three times a day before meals  losartan 50 milliGRAM(s) Oral daily  potassium chloride    Tablet ER 40 milliEquivalent(s) Oral every 4 hours      Physical Exam  General: Patient comfortable in bed  Vital Signs Last 12 Hrs  T(F): 97.8 (02-21-22 @ 08:19), Max: 97.8 (02-21-22 @ 08:19)  HR: 70 (02-21-22 @ 08:19) (66 - 70)  BP: 161/93 (02-21-22 @ 08:19) (161/93 - 172/88)  BP(mean): --  RR: 18 (02-21-22 @ 08:19) (18 - 18)  SpO2: 98% (02-21-22 @ 08:19) (98% - 98%)  Neck: No palpable thyroid nodules.  CVS: S1S2, No murmurs  Respiratory: No wheezing, no crepitations  GI: Abdomen soft, bowel sounds positive  Musculoskeletal:  edema lower extremities.   Skin: No skin rashes, no ecchymosis    Diagnostics    A1C with Estimated Average Glucose: Routine (02-20 @ 15:52)        
    Chief complaint  Patient is a 71y old  Male who presents with a chief complaint of weakness (22 Feb 2022 12:33)   Review of systems  Patient in bed, looks comfortable, no hypoglycemic episodes.    Labs and Fingersticks  CAPILLARY BLOOD GLUCOSE      POCT Blood Glucose.: 140 mg/dL (22 Feb 2022 11:41)  POCT Blood Glucose.: 132 mg/dL (22 Feb 2022 08:58)  POCT Blood Glucose.: 108 mg/dL (21 Feb 2022 21:21)  POCT Blood Glucose.: 190 mg/dL (21 Feb 2022 16:18)      Anion Gap, Serum: 12 (02-22 @ 06:06)  Anion Gap, Serum: 15 (02-21 @ 06:21)      Calcium, Total Serum: 9.3 (02-22 @ 06:06)  Calcium, Total Serum: 9.5 (02-21 @ 06:21)          02-22    147<H>  |  110<H>  |  13  ----------------------------<  126<H>  3.4<L>   |  25  |  1.07    Ca    9.3      22 Feb 2022 06:06                          16.6   7.51  )-----------( 203      ( 21 Feb 2022 06:23 )             51.5     Medications  MEDICATIONS  (STANDING):  aspirin enteric coated 81 milliGRAM(s) Oral daily  atorvastatin 10 milliGRAM(s) Oral at bedtime  dextrose 40% Gel 15 Gram(s) Oral once  dextrose 5% + sodium chloride 0.45%. 1000 milliLiter(s) (70 mL/Hr) IV Continuous <Continuous>  dextrose 5%. 1000 milliLiter(s) (100 mL/Hr) IV Continuous <Continuous>  dextrose 5%. 1000 milliLiter(s) (50 mL/Hr) IV Continuous <Continuous>  dextrose 50% Injectable 25 Gram(s) IV Push once  dextrose 50% Injectable 12.5 Gram(s) IV Push once  dextrose 50% Injectable 25 Gram(s) IV Push once  glucagon  Injectable 1 milliGRAM(s) IntraMuscular once  heparin   Injectable 5000 Unit(s) SubCutaneous every 12 hours  insulin glargine Injectable (LANTUS) 18 Unit(s) SubCutaneous at bedtime  insulin lispro (ADMELOG) corrective regimen sliding scale   SubCutaneous three times a day before meals  insulin lispro (ADMELOG) corrective regimen sliding scale   SubCutaneous at bedtime  insulin lispro Injectable (ADMELOG) 4 Unit(s) SubCutaneous three times a day before meals  losartan 50 milliGRAM(s) Oral daily      Physical Exam  General: Patient comfortable in bed  Vital Signs Last 12 Hrs  T(F): 98.1 (02-22-22 @ 12:23), Max: 98.2 (02-22-22 @ 07:56)  HR: 59 (02-22-22 @ 12:23) (59 - 64)  BP: 153/79 (02-22-22 @ 12:23) (133/80 - 161/90)  BP(mean): --  RR: 18 (02-22-22 @ 12:23) (18 - 18)  SpO2: 98% (02-22-22 @ 12:23) (98% - 99%)  Neck: No palpable thyroid nodules.  CVS: S1S2, No murmurs  Respiratory: No wheezing, no crepitations  GI: Abdomen soft, bowel sounds positive  Musculoskeletal:  edema lower extremities.   Skin: No skin rashes, no ecchymosis    Diagnostics    A1C with Estimated Average Glucose: Routine (02-20 @ 15:52)        
    Chief complaint  Patient is a 71y old  Male who presents with a chief complaint of weakness (23 Feb 2022 09:33)   Review of systems  Patient awake in bed, well-appearing, no hypoglycemic episodes.    Labs and Fingersticks  CAPILLARY BLOOD GLUCOSE      POCT Blood Glucose.: 106 mg/dL (23 Feb 2022 11:34)  POCT Blood Glucose.: 218 mg/dL (23 Feb 2022 07:57)  POCT Blood Glucose.: 133 mg/dL (22 Feb 2022 21:44)  POCT Blood Glucose.: 87 mg/dL (22 Feb 2022 16:17)      Anion Gap, Serum: 13 (02-23 @ 05:47)  Anion Gap, Serum: 12 (02-22 @ 06:06)      Calcium, Total Serum: 9.1 (02-23 @ 05:47)  Calcium, Total Serum: 9.3 (02-22 @ 06:06)          02-23    147<H>  |  109<H>  |  13  ----------------------------<  136<H>  3.2<L>   |  25  |  1.05    Ca    9.1      23 Feb 2022 05:47      Medications  MEDICATIONS  (STANDING):  aspirin enteric coated 81 milliGRAM(s) Oral daily  atorvastatin 10 milliGRAM(s) Oral at bedtime  dextrose 40% Gel 15 Gram(s) Oral once  dextrose 5%. 1000 milliLiter(s) (100 mL/Hr) IV Continuous <Continuous>  dextrose 5%. 1000 milliLiter(s) (50 mL/Hr) IV Continuous <Continuous>  dextrose 5%. 1000 milliLiter(s) (60 mL/Hr) IV Continuous <Continuous>  dextrose 50% Injectable 25 Gram(s) IV Push once  dextrose 50% Injectable 12.5 Gram(s) IV Push once  dextrose 50% Injectable 25 Gram(s) IV Push once  glucagon  Injectable 1 milliGRAM(s) IntraMuscular once  heparin   Injectable 5000 Unit(s) SubCutaneous every 12 hours  insulin glargine Injectable (LANTUS) 18 Unit(s) SubCutaneous at bedtime  insulin lispro (ADMELOG) corrective regimen sliding scale   SubCutaneous three times a day before meals  insulin lispro (ADMELOG) corrective regimen sliding scale   SubCutaneous at bedtime  insulin lispro Injectable (ADMELOG) 4 Unit(s) SubCutaneous three times a day before meals  losartan 50 milliGRAM(s) Oral daily  potassium chloride    Tablet ER 40 milliEquivalent(s) Oral every 4 hours      Physical Exam  General: Patient comfortable in bed  Vital Signs Last 12 Hrs  T(F): 97.8 (02-23-22 @ 12:39), Max: 98.3 (02-23-22 @ 08:48)  HR: 59 (02-23-22 @ 12:39) (57 - 60)  BP: 158/85 (02-23-22 @ 12:39) (142/75 - 158/85)  BP(mean): --  RR: 18 (02-23-22 @ 12:39) (18 - 18)  SpO2: 98% (02-23-22 @ 12:39) (98% - 99%)  Neck: No palpable thyroid nodules.  CVS: S1S2, No murmurs  Respiratory: No wheezing, no crepitations  GI: Abdomen soft, bowel sounds positive  Musculoskeletal:  edema lower extremities.   Skin: No skin rashes, no ecchymosis    Diagnostics    A1C with Estimated Average Glucose: Routine (02-20 @ 15:52)        
    Chief complaint  Patient is a 71y old  Male who presents with a chief complaint of weakness (24 Feb 2022 08:51)   Review of systems  Patient ambulating in room, well-appearing and eager for DC, no hypoglycemic episodes.    Labs and Fingersticks  CAPILLARY BLOOD GLUCOSE      POCT Blood Glucose.: 173 mg/dL (24 Feb 2022 11:25)  POCT Blood Glucose.: 153 mg/dL (24 Feb 2022 07:46)  POCT Blood Glucose.: 127 mg/dL (24 Feb 2022 05:11)  POCT Blood Glucose.: 128 mg/dL (24 Feb 2022 00:26)  POCT Blood Glucose.: 87 mg/dL (23 Feb 2022 22:18)  POCT Blood Glucose.: 103 mg/dL (23 Feb 2022 16:41)      Anion Gap, Serum: 13 (02-24 @ 08:34)  Anion Gap, Serum: 13 (02-23 @ 05:47)      Calcium, Total Serum: 9.2 (02-24 @ 08:34)  Calcium, Total Serum: 9.1 (02-23 @ 05:47)          02-24    145  |  108  |  12  ----------------------------<  193<H>  3.6   |  24  |  1.08    Ca    9.2      24 Feb 2022 08:34      Medications  MEDICATIONS  (STANDING):  aspirin enteric coated 81 milliGRAM(s) Oral daily  atorvastatin 20 milliGRAM(s) Oral at bedtime  dextrose 40% Gel 15 Gram(s) Oral once  dextrose 5%. 1000 milliLiter(s) (100 mL/Hr) IV Continuous <Continuous>  dextrose 5%. 1000 milliLiter(s) (50 mL/Hr) IV Continuous <Continuous>  dextrose 50% Injectable 25 Gram(s) IV Push once  dextrose 50% Injectable 12.5 Gram(s) IV Push once  dextrose 50% Injectable 25 Gram(s) IV Push once  glucagon  Injectable 1 milliGRAM(s) IntraMuscular once  heparin   Injectable 5000 Unit(s) SubCutaneous every 12 hours  insulin glargine Injectable (LANTUS) 18 Unit(s) SubCutaneous at bedtime  insulin lispro (ADMELOG) corrective regimen sliding scale   SubCutaneous three times a day before meals  insulin lispro (ADMELOG) corrective regimen sliding scale   SubCutaneous at bedtime  insulin lispro Injectable (ADMELOG) 4 Unit(s) SubCutaneous three times a day before meals  losartan 50 milliGRAM(s) Oral daily      Physical Exam  General: Patient comfortable in bed  Vital Signs Last 12 Hrs  T(F): 97.8 (02-24-22 @ 08:47), Max: 97.8 (02-24-22 @ 08:47)  HR: 60 (02-24-22 @ 08:47) (60 - 60)  BP: 147/85 (02-24-22 @ 08:47) (147/85 - 147/85)  BP(mean): --  RR: 18 (02-24-22 @ 08:47) (18 - 18)  SpO2: 97% (02-24-22 @ 08:47) (97% - 97%)  Neck: No palpable thyroid nodules.  CVS: S1S2, No murmurs  Respiratory: No wheezing, no crepitations  GI: Abdomen soft, bowel sounds positive  Musculoskeletal:  edema lower extremities.   Skin: No skin rashes, no ecchymosis    Diagnostics    Beta Hydroxy-Butyrate: AM Sched. Collection: 24-Feb-2022 06:00 (02-23 @ 14:16)  A1C with Estimated Average Glucose: Routine (02-20 @ 15:52)

## 2022-02-24 NOTE — DISCHARGE NOTE NURSING/CASE MANAGEMENT/SOCIAL WORK - NSDCFUADDAPPT_GEN_ALL_CORE_FT
APPTS ARE READY TO BE MADE: [X] YES    Best Family or Patient Contact (if needed):    Additional Information about above appointments (if needed):    1: Primary care  2: Endo     Other comments or requests:

## 2022-02-24 NOTE — PROGRESS NOTE ADULT - ATTENDING COMMENTS
patient plan of care discussed with the ACP.
patient seen and plan of care  discussed with the ACP.
Plan of care discussed with the ACP
Plan of care discussed with the patient

## 2022-02-25 ENCOUNTER — FORM ENCOUNTER (OUTPATIENT)
Age: 72
End: 2022-02-25

## 2022-03-03 ENCOUNTER — INPATIENT (INPATIENT)
Facility: HOSPITAL | Age: 72
LOS: 3 days | Discharge: HOME HEALTH | DRG: 699 | End: 2022-03-07
Attending: INTERNAL MEDICINE | Admitting: INTERNAL MEDICINE
Payer: COMMERCIAL

## 2022-03-03 VITALS
RESPIRATION RATE: 19 BRPM | SYSTOLIC BLOOD PRESSURE: 129 MMHG | OXYGEN SATURATION: 97 % | HEART RATE: 105 BPM | HEIGHT: 67 IN | TEMPERATURE: 97 F | WEIGHT: 145.06 LBS | DIASTOLIC BLOOD PRESSURE: 83 MMHG

## 2022-03-03 DIAGNOSIS — R62.7 ADULT FAILURE TO THRIVE: ICD-10-CM

## 2022-03-03 DIAGNOSIS — N40.1 BENIGN PROSTATIC HYPERPLASIA WITH LOWER URINARY TRACT SYMPTOMS: ICD-10-CM

## 2022-03-03 DIAGNOSIS — N17.9 ACUTE KIDNEY FAILURE, UNSPECIFIED: ICD-10-CM

## 2022-03-03 LAB
ALBUMIN SERPL ELPH-MCNC: 4.5 G/DL — SIGNIFICANT CHANGE UP (ref 3.3–5)
ALP SERPL-CCNC: 73 U/L — SIGNIFICANT CHANGE UP (ref 40–120)
ALT FLD-CCNC: 21 U/L — SIGNIFICANT CHANGE UP (ref 10–45)
ANION GAP SERPL CALC-SCNC: 16 MMOL/L — SIGNIFICANT CHANGE UP (ref 5–17)
APPEARANCE UR: ABNORMAL
APPEARANCE UR: CLEAR — SIGNIFICANT CHANGE UP
AST SERPL-CCNC: 23 U/L — SIGNIFICANT CHANGE UP (ref 10–40)
BACTERIA # UR AUTO: NEGATIVE — SIGNIFICANT CHANGE UP
BACTERIA # UR AUTO: NEGATIVE — SIGNIFICANT CHANGE UP
BASOPHILS # BLD AUTO: 0.03 K/UL — SIGNIFICANT CHANGE UP (ref 0–0.2)
BASOPHILS NFR BLD AUTO: 0.3 % — SIGNIFICANT CHANGE UP (ref 0–2)
BILIRUB SERPL-MCNC: 0.9 MG/DL — SIGNIFICANT CHANGE UP (ref 0.2–1.2)
BILIRUB UR-MCNC: NEGATIVE — SIGNIFICANT CHANGE UP
BILIRUB UR-MCNC: NEGATIVE — SIGNIFICANT CHANGE UP
BUN SERPL-MCNC: 27 MG/DL — HIGH (ref 7–23)
CALCIUM SERPL-MCNC: 9.9 MG/DL — SIGNIFICANT CHANGE UP (ref 8.4–10.5)
CHLORIDE SERPL-SCNC: 103 MMOL/L — SIGNIFICANT CHANGE UP (ref 96–108)
CO2 SERPL-SCNC: 25 MMOL/L — SIGNIFICANT CHANGE UP (ref 22–31)
COLOR SPEC: ABNORMAL
COLOR SPEC: SIGNIFICANT CHANGE UP
CREAT ?TM UR-MCNC: 57 MG/DL — SIGNIFICANT CHANGE UP
CREAT SERPL-MCNC: 1.4 MG/DL — HIGH (ref 0.5–1.3)
DIFF PNL FLD: ABNORMAL
DIFF PNL FLD: NEGATIVE — SIGNIFICANT CHANGE UP
EGFR: 53 ML/MIN/1.73M2 — LOW
EOSINOPHIL # BLD AUTO: 0.04 K/UL — SIGNIFICANT CHANGE UP (ref 0–0.5)
EOSINOPHIL NFR BLD AUTO: 0.5 % — SIGNIFICANT CHANGE UP (ref 0–6)
EPI CELLS # UR: 1 /HPF — SIGNIFICANT CHANGE UP
GLUCOSE BLDC GLUCOMTR-MCNC: 76 MG/DL — SIGNIFICANT CHANGE UP (ref 70–99)
GLUCOSE SERPL-MCNC: 74 MG/DL — SIGNIFICANT CHANGE UP (ref 70–99)
GLUCOSE UR QL: NEGATIVE — SIGNIFICANT CHANGE UP
GLUCOSE UR QL: NEGATIVE — SIGNIFICANT CHANGE UP
HCT VFR BLD CALC: 51.7 % — HIGH (ref 39–50)
HGB BLD-MCNC: 16.7 G/DL — SIGNIFICANT CHANGE UP (ref 13–17)
HYALINE CASTS # UR AUTO: 2 /LPF — SIGNIFICANT CHANGE UP (ref 0–2)
IMM GRANULOCYTES NFR BLD AUTO: 0.5 % — SIGNIFICANT CHANGE UP (ref 0–1.5)
KETONES UR-MCNC: ABNORMAL
KETONES UR-MCNC: ABNORMAL
LEUKOCYTE ESTERASE UR-ACNC: ABNORMAL
LEUKOCYTE ESTERASE UR-ACNC: NEGATIVE — SIGNIFICANT CHANGE UP
LYMPHOCYTES # BLD AUTO: 1.41 K/UL — SIGNIFICANT CHANGE UP (ref 1–3.3)
LYMPHOCYTES # BLD AUTO: 16.2 % — SIGNIFICANT CHANGE UP (ref 13–44)
MAGNESIUM SERPL-MCNC: 2 MG/DL — SIGNIFICANT CHANGE UP (ref 1.6–2.6)
MCHC RBC-ENTMCNC: 28.7 PG — SIGNIFICANT CHANGE UP (ref 27–34)
MCHC RBC-ENTMCNC: 32.3 GM/DL — SIGNIFICANT CHANGE UP (ref 32–36)
MCV RBC AUTO: 88.8 FL — SIGNIFICANT CHANGE UP (ref 80–100)
MONOCYTES # BLD AUTO: 0.7 K/UL — SIGNIFICANT CHANGE UP (ref 0–0.9)
MONOCYTES NFR BLD AUTO: 8 % — SIGNIFICANT CHANGE UP (ref 2–14)
NEUTROPHILS # BLD AUTO: 6.51 K/UL — SIGNIFICANT CHANGE UP (ref 1.8–7.4)
NEUTROPHILS NFR BLD AUTO: 74.5 % — SIGNIFICANT CHANGE UP (ref 43–77)
NITRITE UR-MCNC: NEGATIVE — SIGNIFICANT CHANGE UP
NITRITE UR-MCNC: NEGATIVE — SIGNIFICANT CHANGE UP
NRBC # BLD: 0 /100 WBCS — SIGNIFICANT CHANGE UP (ref 0–0)
PH UR: 5 — SIGNIFICANT CHANGE UP (ref 5–8)
PH UR: 5.5 — SIGNIFICANT CHANGE UP (ref 5–8)
PHOSPHATE SERPL-MCNC: 3.7 MG/DL — SIGNIFICANT CHANGE UP (ref 2.5–4.5)
PLATELET # BLD AUTO: 208 K/UL — SIGNIFICANT CHANGE UP (ref 150–400)
POTASSIUM SERPL-MCNC: 3.7 MMOL/L — SIGNIFICANT CHANGE UP (ref 3.5–5.3)
POTASSIUM SERPL-SCNC: 3.7 MMOL/L — SIGNIFICANT CHANGE UP (ref 3.5–5.3)
PROT ?TM UR-MCNC: 91 MG/DL — HIGH (ref 0–12)
PROT SERPL-MCNC: 7.4 G/DL — SIGNIFICANT CHANGE UP (ref 6–8.3)
PROT UR-MCNC: 100 — SIGNIFICANT CHANGE UP
PROT UR-MCNC: NEGATIVE — SIGNIFICANT CHANGE UP
PROT/CREAT UR-RTO: 1.6 RATIO — HIGH (ref 0–0.2)
RBC # BLD: 5.82 M/UL — HIGH (ref 4.2–5.8)
RBC # FLD: 13.2 % — SIGNIFICANT CHANGE UP (ref 10.3–14.5)
RBC CASTS # UR COMP ASSIST: 1383 /HPF — HIGH (ref 0–4)
RBC CASTS # UR COMP ASSIST: 4 /HPF — SIGNIFICANT CHANGE UP (ref 0–4)
SARS-COV-2 RNA SPEC QL NAA+PROBE: SIGNIFICANT CHANGE UP
SODIUM SERPL-SCNC: 144 MMOL/L — SIGNIFICANT CHANGE UP (ref 135–145)
SP GR SPEC: 1.02 — SIGNIFICANT CHANGE UP (ref 1.01–1.02)
SP GR SPEC: 1.02 — SIGNIFICANT CHANGE UP (ref 1.01–1.02)
T4 FREE SERPL-MCNC: 1.2 NG/DL — SIGNIFICANT CHANGE UP (ref 0.9–1.8)
TSH SERPL-MCNC: 0.94 UIU/ML — SIGNIFICANT CHANGE UP (ref 0.27–4.2)
UROBILINOGEN FLD QL: NEGATIVE — SIGNIFICANT CHANGE UP
UROBILINOGEN FLD QL: NEGATIVE — SIGNIFICANT CHANGE UP
WBC # BLD: 8.73 K/UL — SIGNIFICANT CHANGE UP (ref 3.8–10.5)
WBC # FLD AUTO: 8.73 K/UL — SIGNIFICANT CHANGE UP (ref 3.8–10.5)
WBC UR QL: 4 /HPF — SIGNIFICANT CHANGE UP (ref 0–5)
WBC UR QL: 8 /HPF — HIGH (ref 0–5)

## 2022-03-03 PROCEDURE — 71260 CT THORAX DX C+: CPT | Mod: 26,MA

## 2022-03-03 PROCEDURE — 74177 CT ABD & PELVIS W/CONTRAST: CPT | Mod: 26,MA

## 2022-03-03 PROCEDURE — 70450 CT HEAD/BRAIN W/O DYE: CPT | Mod: 26,MA

## 2022-03-03 PROCEDURE — 93010 ELECTROCARDIOGRAM REPORT: CPT | Mod: NC

## 2022-03-03 PROCEDURE — 99285 EMERGENCY DEPT VISIT HI MDM: CPT

## 2022-03-03 RX ORDER — DEXTROSE MONOHYDRATE, SODIUM CHLORIDE, AND POTASSIUM CHLORIDE 50; .745; 4.5 G/1000ML; G/1000ML; G/1000ML
1000 INJECTION, SOLUTION INTRAVENOUS
Refills: 0 | Status: DISCONTINUED | OUTPATIENT
Start: 2022-03-03 | End: 2022-03-05

## 2022-03-03 RX ORDER — INSULIN GLARGINE 100 [IU]/ML
10 INJECTION, SOLUTION SUBCUTANEOUS AT BEDTIME
Refills: 0 | Status: DISCONTINUED | OUTPATIENT
Start: 2022-03-03 | End: 2022-03-03

## 2022-03-03 RX ORDER — ATORVASTATIN CALCIUM 80 MG/1
20 TABLET, FILM COATED ORAL AT BEDTIME
Refills: 0 | Status: DISCONTINUED | OUTPATIENT
Start: 2022-03-03 | End: 2022-03-07

## 2022-03-03 RX ORDER — GLUCAGON INJECTION, SOLUTION 0.5 MG/.1ML
1 INJECTION, SOLUTION SUBCUTANEOUS ONCE
Refills: 0 | Status: DISCONTINUED | OUTPATIENT
Start: 2022-03-03 | End: 2022-03-07

## 2022-03-03 RX ORDER — SODIUM CHLORIDE 9 MG/ML
1000 INJECTION, SOLUTION INTRAVENOUS
Refills: 0 | Status: DISCONTINUED | OUTPATIENT
Start: 2022-03-03 | End: 2022-03-07

## 2022-03-03 RX ORDER — DEXTROSE 50 % IN WATER 50 %
15 SYRINGE (ML) INTRAVENOUS ONCE
Refills: 0 | Status: DISCONTINUED | OUTPATIENT
Start: 2022-03-03 | End: 2022-03-07

## 2022-03-03 RX ORDER — INSULIN LISPRO 100/ML
VIAL (ML) SUBCUTANEOUS
Refills: 0 | Status: DISCONTINUED | OUTPATIENT
Start: 2022-03-03 | End: 2022-03-07

## 2022-03-03 RX ORDER — DEXTROSE 50 % IN WATER 50 %
25 SYRINGE (ML) INTRAVENOUS ONCE
Refills: 0 | Status: DISCONTINUED | OUTPATIENT
Start: 2022-03-03 | End: 2022-03-07

## 2022-03-03 RX ORDER — INSULIN LISPRO 100/ML
VIAL (ML) SUBCUTANEOUS
Refills: 0 | Status: DISCONTINUED | OUTPATIENT
Start: 2022-03-03 | End: 2022-03-03

## 2022-03-03 RX ORDER — DEXTROSE 50 % IN WATER 50 %
12.5 SYRINGE (ML) INTRAVENOUS ONCE
Refills: 0 | Status: DISCONTINUED | OUTPATIENT
Start: 2022-03-03 | End: 2022-03-07

## 2022-03-03 RX ORDER — ASPIRIN/CALCIUM CARB/MAGNESIUM 324 MG
81 TABLET ORAL DAILY
Refills: 0 | Status: DISCONTINUED | OUTPATIENT
Start: 2022-03-03 | End: 2022-03-07

## 2022-03-03 RX ORDER — RIVAROXABAN 15 MG-20MG
10 KIT ORAL DAILY
Refills: 0 | Status: DISCONTINUED | OUTPATIENT
Start: 2022-03-03 | End: 2022-03-03

## 2022-03-03 RX ORDER — HEPARIN SODIUM 5000 [USP'U]/ML
5000 INJECTION INTRAVENOUS; SUBCUTANEOUS EVERY 12 HOURS
Refills: 0 | Status: DISCONTINUED | OUTPATIENT
Start: 2022-03-03 | End: 2022-03-07

## 2022-03-03 RX ORDER — SODIUM CHLORIDE 9 MG/ML
1000 INJECTION INTRAMUSCULAR; INTRAVENOUS; SUBCUTANEOUS ONCE
Refills: 0 | Status: COMPLETED | OUTPATIENT
Start: 2022-03-03 | End: 2022-03-03

## 2022-03-03 RX ORDER — INSULIN GLARGINE 100 [IU]/ML
4 INJECTION, SOLUTION SUBCUTANEOUS AT BEDTIME
Refills: 0 | Status: DISCONTINUED | OUTPATIENT
Start: 2022-03-03 | End: 2022-03-04

## 2022-03-03 RX ADMIN — DEXTROSE MONOHYDRATE, SODIUM CHLORIDE, AND POTASSIUM CHLORIDE 50 MILLILITER(S): 50; .745; 4.5 INJECTION, SOLUTION INTRAVENOUS at 18:47

## 2022-03-03 RX ADMIN — DEXTROSE MONOHYDRATE, SODIUM CHLORIDE, AND POTASSIUM CHLORIDE 50 MILLILITER(S): 50; .745; 4.5 INJECTION, SOLUTION INTRAVENOUS at 11:47

## 2022-03-03 RX ADMIN — HEPARIN SODIUM 5000 UNIT(S): 5000 INJECTION INTRAVENOUS; SUBCUTANEOUS at 16:56

## 2022-03-03 RX ADMIN — SODIUM CHLORIDE 1000 MILLILITER(S): 9 INJECTION INTRAMUSCULAR; INTRAVENOUS; SUBCUTANEOUS at 04:17

## 2022-03-03 RX ADMIN — Medication 81 MILLIGRAM(S): at 16:55

## 2022-03-03 NOTE — CONSULT NOTE ADULT - SUBJECTIVE AND OBJECTIVE BOX
HPI  Patient denies prior gu history but with moderate irritative obstructive voiding symptoms presented to ns er with co chills, rigors and  nausea.  Cre was elevated to 1.4 and ct revealed enlarged prostate, distended bladder with bilateral hydro cw acute/ chronic retention.   A berry was placed without difficulty with pvr 600cc and he is now comfortable.  He is a 71y male presented to the ED ambulatory from home with c/o chills.  Reports having decreased appetite, loss of smell, a change in taste, nausea, weight loss and generalized fatigue x 2 weeks. Reports being unable to eat due to the change in taste, states anytime he eats he "can't swallow the food" because he gets nauseous and does not like the taste of the food. Patient also reports having intermittent "chills" for the past 2-3 days. Respirations spontaneous, even, and non-labored. Abdomen soft, non-distended and non-tender upon palpation. No swelling noted in bilateral lower extremities. Denies chest pain, SOB, headache, vomiting, diarrhea, abdominal pain, burning upon urination or difficulty urinating, hematuria.  He has experienced nocturia, frequency and incomplete emptying.    PAST MEDICAL & SURGICAL HISTORY:  None    MEDICATIONS  (STANDING):    FAMILY HISTORY: non contributory      Allergies    No Known Allergies    SOCIAL HISTORY: no tobacco etoh    REVIEW OF SYSTEMS: gen chills, margaret,fatigue  heent neg neck neg resp neg  gi nausea, cv neg   gu per hpi msk neg neuro neg  skin neg  endo neg all neg psych neg    Physical Exam  Vital signs  T(C): 36.8 (03-03-22 @ 07:43), Max: 36.8 (03-03-22 @ 07:43)  HR: 83 (03-03-22 @ 07:43)  BP: 130/78 (03-03-22 @ 07:43)  SpO2: 99% (03-03-22 @ 07:43)    Gen:  WDWN male in nad  heent ncat   neck symm supple  cor reg chest clear  abd soft ndnt no cvat  gu 3+ no mass indwelling berry urine clear  psych a a nd 0 x 3   neuro non focal ext no cce    LABS:      03-03 @ 04:07    WBC 8.73  / Hct 51.7  / SCr 1.40     03-03    144  |  103  |  27<H>  ----------------------------<  74  3.7   |  25  |  1.40<H>    Ca    9.9      03 Mar 2022 04:07  Phos  3.7     03-03  Mg     2.0     03-03    TPro  7.4  /  Alb  4.5  /  TBili  0.9  /  DBili  x   /  AST  23  /  ALT  21  /  AlkPhos  73  03-03        RADIOLOGY: moderate bilateral hydro with distended bladder enlarged prostate with large intravesical median lobe

## 2022-03-03 NOTE — PROGRESS NOTE ADULT - SUBJECTIVE AND OBJECTIVE BOX
afberile    REVIEW OF SYSTEMS:  GEN: no fever,    no chills  RESP: no SOB,   no cough  CVS: no chest pain,   no palpitations  GI: no abdominal pain,   no nausea,   no vomiting,   no constipation,   no diarrhea  : no dysuria,   no frequency  NEURO: no headache,   no dizziness  PSYCH: no depression,   not anxious  Derm : no rash    MEDICATIONS  (STANDING):  aspirin enteric coated 81 milliGRAM(s) Oral daily  atorvastatin 20 milliGRAM(s) Oral at bedtime  dextrose 40% Gel 15 Gram(s) Oral once  dextrose 5%. 1000 milliLiter(s) (50 mL/Hr) IV Continuous <Continuous>  dextrose 5%. 1000 milliLiter(s) (100 mL/Hr) IV Continuous <Continuous>  dextrose 50% Injectable 25 Gram(s) IV Push once  dextrose 50% Injectable 12.5 Gram(s) IV Push once  dextrose 50% Injectable 25 Gram(s) IV Push once  glucagon  Injectable 1 milliGRAM(s) IntraMuscular once  heparin   Injectable 5000 Unit(s) SubCutaneous every 12 hours  insulin glargine Injectable (LANTUS) 10 Unit(s) SubCutaneous at bedtime  insulin lispro (ADMELOG) corrective regimen sliding scale   SubCutaneous three times a day before meals  sodium chloride 0.45% with potassium chloride 20 mEq/L 1000 milliLiter(s) (50 mL/Hr) IV Continuous <Continuous>    MEDICATIONS  (PRN):      Vital Signs Last 24 Hrs  T(C): 36.7 (03 Mar 2022 11:33), Max: 36.8 (03 Mar 2022 07:43)  T(F): 98 (03 Mar 2022 11:33), Max: 98.2 (03 Mar 2022 07:43)  HR: 73 (03 Mar 2022 11:33) (73 - 105)  BP: 138/81 (03 Mar 2022 11:33) (129/83 - 138/89)  BP(mean): --  RR: 18 (03 Mar 2022 11:33) (16 - 20)  SpO2: 98% (03 Mar 2022 11:33) (97% - 99%)  CAPILLARY BLOOD GLUCOSE      POCT Blood Glucose.: 72 mg/dL (03 Mar 2022 03:14)    I&O's Summary    03 Mar 2022 07:01  -  03 Mar 2022 17:13  --------------------------------------------------------  IN: 0 mL / OUT: 1600 mL / NET: -1600 mL        PHYSICAL EXAM:  HEAD:  Atraumatic, Normocephalic  NECK: Supple, No   JVD  CHEST/LUNG:   no     rales,     no,    rhonchi  HEART: Regular rate and rhythm;         murmur  ABDOMEN: Soft, Nontender, ;   EXTREMITIES:    no    edema  NEUROLOGY:  alert    LABS:                        16.7   8.73  )-----------( 208      ( 03 Mar 2022 04:07 )             51.7     -    144  |  103  |  27<H>  ----------------------------<  74  3.7   |  25  |  1.40<H>    Ca    9.9      03 Mar 2022 04:07  Phos  3.7     03-03  Mg     2.0     03-03    TPro  7.4  /  Alb  4.5  /  TBili  0.9  /  DBili  x   /  AST  23  /  ALT  21  /  AlkPhos  73  03-03          Urinalysis Basic - ( 03 Mar 2022 16:29 )    Color: Light Orange / Appearance: Turbid / S.024 / pH: x  Gluc: x / Ketone: Small  / Bili: Negative / Urobili: Negative   Blood: x / Protein: 100 / Nitrite: Negative   Leuk Esterase: Negative / RBC: 1383 /hpf / WBC 4 /HPF   Sq Epi: x / Non Sq Epi: x / Bacteria: Negative              Thyroid Stimulating Hormone, Serum: 0.94 uIU/mL ( @ 06:26)          Consultant(s) Notes Reviewed:      Care Discussed with Consultants/Other Providers:

## 2022-03-03 NOTE — ED ADULT NURSE REASSESSMENT NOTE - NS ED NURSE REASSESS COMMENT FT1
Bladder scan performed at bedside showing approximately 500 mL of urine post-residual. MD Umana and Reynaldo made aware. Patient currently refusing placement of Ramírez catheter. MD's to speak with patient regarding results of CT scan and placement of catheter.

## 2022-03-03 NOTE — CONSULT NOTE ADULT - SUBJECTIVE AND OBJECTIVE BOX
CHIEF COMPLAINT:Patient is a 71y old  Male who presents with a chief complaint of Pt c/o "chills, body shakes, nausea, decreased appetite" (03 Mar 2022 08:46)      HPI:  71 year-old man with history of hypertension and type 2 diabetes. He presented overnight to the [facility] ER with loss of appetite for the past 2 weeks. He admits to intermittent vomiting, but denies nausea; he admits to feeling cold all the time. Noted on admission to have GINA with a creatinine of 1.4mg/dL.  pan CT was done in the ED and an urinary bladder outlet obstruction with bilateral [place] was identified.  Ramírez placed in ER with sudden 600cc urine output. He admits to urinary frequency for the past 2 weeks; denies known history of CKD/GINA.  (03 Mar 2022 16:22)      PAST MEDICAL & SURGICAL HISTORY:      MEDICATIONS  (STANDING):  aspirin enteric coated 81 milliGRAM(s) Oral daily  atorvastatin 20 milliGRAM(s) Oral at bedtime  dextrose 40% Gel 15 Gram(s) Oral once  dextrose 5%. 1000 milliLiter(s) (50 mL/Hr) IV Continuous <Continuous>  dextrose 5%. 1000 milliLiter(s) (100 mL/Hr) IV Continuous <Continuous>  dextrose 50% Injectable 25 Gram(s) IV Push once  dextrose 50% Injectable 12.5 Gram(s) IV Push once  dextrose 50% Injectable 25 Gram(s) IV Push once  glucagon  Injectable 1 milliGRAM(s) IntraMuscular once  heparin   Injectable 5000 Unit(s) SubCutaneous every 12 hours  insulin glargine Injectable (LANTUS) 4 Unit(s) SubCutaneous at bedtime  insulin lispro (ADMELOG) corrective regimen sliding scale   SubCutaneous three times a day before meals  sodium chloride 0.45% with potassium chloride 20 mEq/L 1000 milliLiter(s) (50 mL/Hr) IV Continuous <Continuous>    MEDICATIONS  (PRN):      FAMILY HISTORY:      SOCIAL HISTORY:    [ ] Non-smoker  [ ] Smoker  [ ] Alcohol    Allergies    No Known Allergies    Intolerances    	    REVIEW OF SYSTEMS:  CONSTITUTIONAL: No fever, weight loss, or fatigue  EYES: No eye pain, visual disturbances, or discharge  ENT:  No difficulty hearing, tinnitus, vertigo; No sinus or throat pain  NECK: No pain or stiffness  RESPIRATORY: No cough, wheezing, chills or hemoptysis; No Shortness of Breath  CARDIOVASCULAR: No chest pain, palpitations, passing out, dizziness, or leg swelling  GASTROINTESTINAL: No abdominal or epigastric pain. No nausea, vomiting, or hematemesis; No diarrhea or constipation. No melena or hematochezia.  GENITOURINARY: No dysuria, frequency, hematuria, or incontinence  NEUROLOGICAL: No headaches, memory loss, loss of strength, numbness, or tremors  SKIN: No itching, burning, rashes, or lesions   LYMPH Nodes: No enlarged glands  ENDOCRINE: No heat or cold intolerance; No hair loss  MUSCULOSKELETAL: No joint pain or swelling; No muscle, back, or extremity pain  PSYCHIATRIC: No depression, anxiety, mood swings, or difficulty sleeping  HEME/LYMPH: No easy bruising, or bleeding gums  ALLERGY AND IMMUNOLOGIC: No hives or eczema	    [ ] All others negative	  [ ] Unable to obtain    PHYSICAL EXAM:  T(C): 36.3 (03-03-22 @ 18:05), Max: 36.8 (03-03-22 @ 07:43)  HR: 70 (03-03-22 @ 18:05) (70 - 105)  BP: 113/71 (03-03-22 @ 18:05) (113/71 - 138/89)  RR: 18 (03-03-22 @ 18:05) (16 - 20)  SpO2: 98% (03-03-22 @ 18:05) (97% - 99%)  Wt(kg): --  I&O's Summary    03 Mar 2022 07:01  -  03 Mar 2022 18:20  --------------------------------------------------------  IN: 0 mL / OUT: 1600 mL / NET: -1600 mL        Appearance: Normal	  HEENT:   Normal oral mucosa, PERRL, EOMI	  Lymphatic: No lymphadenopathy  Cardiovascular: Normal S1 S2, No JVD, No murmurs, No edema  Respiratory: Lungs clear to auscultation	  Psychiatry: A & O x 3, Mood & affect appropriate  Gastrointestinal:  Soft, Non-tender, + BS	  Skin: No rashes, No ecchymoses, No cyanosis	  Neurologic: Non-focal  Extremities: Normal range of motion, No clubbing, cyanosis or edema  Vascular: Peripheral pulses palpable 2+ bilaterally    TELEMETRY: 	    ECG:  	  RADIOLOGY:  OTHER: 	  	  LABS:	 	    CARDIAC MARKERS:                              16.7   8.73  )-----------( 208      ( 03 Mar 2022 04:07 )             51.7     03-03    144  |  103  |  27<H>  ----------------------------<  74  3.7   |  25  |  1.40<H>    Ca    9.9      03 Mar 2022 04:07  Phos  3.7     03-03  Mg     2.0     03-03    TPro  7.4  /  Alb  4.5  /  TBili  0.9  /  DBili  x   /  AST  23  /  ALT  21  /  AlkPhos  73  03-03    proBNP:   Lipid Profile:   HgA1c:   TSH: Thyroid Stimulating Hormone, Serum: 0.94 uIU/mL (03-03 @ 06:26)        PREVIOUS DIAGNOSTIC TESTING:    [ ] Echocardiogram:  [ ]  Catheterization:  [ ] Stress Test:         CHIEF COMPLAINT:Patient is a 71y old  Male who presents with a chief complaint of Pt c/o "chills, body shakes, nausea, decreased appetite" (03 Mar 2022 08:46)      HPI:  71 year-old man with history of hypertension and type 2 diabetes. He presented overnight to the [facility] ER with loss of appetite for the past 2 weeks. He admits to intermittent vomiting, but denies nausea; he admits to feeling cold all the time. Noted on admission to have GINA with a creatinine of 1.4mg/dL.  pan CT was done in the ED and an urinary bladder outlet obstruction with bilateral [place] was identified.  Ramírez placed in ER with sudden 600cc urine output. He admits to urinary frequency for the past 2 weeks; denies known history of CKD/GINA.   pt was recently admitted sec to uncontrolled dm and is scheduled for stress test this coming Monday.  no chest pain.      PAST MEDICAL & SURGICAL HISTORY:      MEDICATIONS  (STANDING):  aspirin enteric coated 81 milliGRAM(s) Oral daily  atorvastatin 20 milliGRAM(s) Oral at bedtime  dextrose 40% Gel 15 Gram(s) Oral once  dextrose 5%. 1000 milliLiter(s) (50 mL/Hr) IV Continuous <Continuous>  dextrose 5%. 1000 milliLiter(s) (100 mL/Hr) IV Continuous <Continuous>  dextrose 50% Injectable 25 Gram(s) IV Push once  dextrose 50% Injectable 12.5 Gram(s) IV Push once  dextrose 50% Injectable 25 Gram(s) IV Push once  glucagon  Injectable 1 milliGRAM(s) IntraMuscular once  heparin   Injectable 5000 Unit(s) SubCutaneous every 12 hours  insulin glargine Injectable (LANTUS) 4 Unit(s) SubCutaneous at bedtime  insulin lispro (ADMELOG) corrective regimen sliding scale   SubCutaneous three times a day before meals  sodium chloride 0.45% with potassium chloride 20 mEq/L 1000 milliLiter(s) (50 mL/Hr) IV Continuous <Continuous>    MEDICATIONS  (PRN):      FAMILY HISTORY:      SOCIAL HISTORY:    [ ] Non-smoker  [ ] Smoker  [ ] Alcohol    Allergies    No Known Allergies    Intolerances    	    REVIEW OF SYSTEMS:  CONSTITUTIONAL: No fever, weight loss, or fatigue  EYES: No eye pain, visual disturbances, or discharge  ENT:  No difficulty hearing, tinnitus, vertigo; No sinus or throat pain  NECK: No pain or stiffness  RESPIRATORY: No cough, wheezing, chills or hemoptysis; No Shortness of Breath  CARDIOVASCULAR: No chest pain, palpitations, passing out, dizziness, or leg swelling  GASTROINTESTINAL: No abdominal or epigastric pain. No nausea, vomiting, or hematemesis; No diarrhea or constipation. No melena or hematochezia.  GENITOURINARY: No dysuria, frequency, hematuria, or incontinence  NEUROLOGICAL: No headaches, memory loss, loss of strength, numbness, or tremors  SKIN: No itching, burning, rashes, or lesions   LYMPH Nodes: No enlarged glands  ENDOCRINE: No heat or cold intolerance; No hair loss  MUSCULOSKELETAL: No joint pain or swelling; No muscle, back, or extremity pain  PSYCHIATRIC: No depression, anxiety, mood swings, or difficulty sleeping  HEME/LYMPH: No easy bruising, or bleeding gums  ALLERGY AND IMMUNOLOGIC: No hives or eczema	    [ ] All others negative	  [ ] Unable to obtain    PHYSICAL EXAM:  T(C): 36.3 (22 @ 18:05), Max: 36.8 (22 @ 07:43)  HR: 70 (22 @ 18:05) (70 - 105)  BP: 113/71 (22 @ 18:05) (113/71 - 138/89)  RR: 18 (22 @ 18:05) (16 - 20)  SpO2: 98% (22 @ 18:05) (97% - 99%)  Wt(kg): --  I&O's Summary    03 Mar 2022 07:01  -  03 Mar 2022 18:20  --------------------------------------------------------  IN: 0 mL / OUT: 1600 mL / NET: -1600 mL        Appearance: Normal	  HEENT:   Normal oral mucosa, PERRL, EOMI	  Lymphatic: No lymphadenopathy  Cardiovascular: Normal S1 S2, No JVD, No murmurs, No edema  Respiratory: Lungs clear to auscultation	  Psychiatry: A & O x 3, Mood & affect appropriate  Gastrointestinal:  Soft, Non-tender, + BS	  Skin: No rashes, No ecchymoses, No cyanosis	  Neurologic: Non-focal  Extremities: Normal range of motion, No clubbing, cyanosis or edema  Vascular: Peripheral pulses palpable 2+ bilaterally    TELEMETRY: 	    ECG:  	  RADIOLOGY:  OTHER: 	  	  LABS:	 	    CARDIAC MARKERS:                              16.7   8.73  )-----------( 208      ( 03 Mar 2022 04:07 )             51.7     -03    144  |  103  |  27<H>  ----------------------------<  74  3.7   |  25  |  1.40<H>    Ca    9.9      03 Mar 2022 04:07  Phos  3.7     03-03  Mg     2.0     -    TPro  7.4  /  Alb  4.5  /  TBili  0.9  /  DBili  x   /  AST  23  /  ALT  21  /  AlkPhos  73      proBNP:   Lipid Profile:   HgA1c:   TSH: Thyroid Stimulating Hormone, Serum: 0.94 uIU/mL ( @ 06:26)        PREVIOUS DIAGNOSTIC TESTIN yo M no PMH with 1 week lethargy, weakness, fatigue, polyuria, polydipsia.Went to PMD who diagnosed with htn and DM. Rx'd him with medication which  not started yet.   Symptoms worsened today so sent here.  .   No abdominal pain, no fever, no chest pain, no sob no dysuria. Endorses chills, transient blurred vision yesterday and chronic b/l LE paresthesias.   pt with new onset of DM with trop of 19, ?cause of sudden increase blood sugar  check cultures  fu cardiac enzymes  ecg noted no sig changes  agree with echo  BL parasthenia ,cs spine x ray  hypernatremia, hydration  check lipid panel  increase bp/ dm add ace or ARB, change losartan to 100 mg daily if bp remains elevated  echo results noted  dvt prophylaxis  plan for  stress test as out pt  will increase losartan if bp elevated  increase lipitor to 20 mg qhs, fu lft

## 2022-03-03 NOTE — PROGRESS NOTE ADULT - ASSESSMENT
70 yo M   recnetly dx  dm.  was   seen by  sher,  now returns   No abdominal pain, no fever, no chest pain, no sob no dysuria.  COVID vaccinated no sick contacts.      pt  was   d/.c  by  myself on 2/24/22,  and  was  seen by  danya guevara  and  sher littlejohn,  who  will be  f/p  on pt          c/o   with loss of appetite for the past 2 weeks, in associated with impaired small/impaired taste.   He admits to intermittent vomiting, but denies nausea;    Noted on admission to have GINA with a creatinine of 1.4    CT I+ in the ER to have bilateral hydro to level of bladder, consistent with bladder outlet obstruction.    Ramírez placed in ER with sudden 600cc urine output.  seen by urology  on  flomax  DM,  follow  fs   on asa, lipitor   rx plan per sher littlejohn. on lantus   on iv fluids on  dvt ppx

## 2022-03-03 NOTE — ED PROVIDER NOTE - CLINICAL SUMMARY MEDICAL DECISION MAKING FREE TEXT BOX
Pt DM here for failure to thrive. VSS. Exam WNL. Concern for malignancy vs endocrine pathology vs viral illness. Labs, ekg, CTs, prn lyte repletion if needed, covid.

## 2022-03-03 NOTE — ED PROVIDER NOTE - PROGRESS NOTE DETAILS
EFREN Umana (PGY-2) - pt w/ urinary obstruction w/ blateral hydro. Ramírez placed. admit for failure to thrive to dr. lang

## 2022-03-03 NOTE — CONSULT NOTE ADULT - ASSESSMENT
patient with acute/ chronic urinary retention with acute kidney injury  berry to sd and will need to dc with berry to allow recovery  likely will require gu intervention    encourage po fluids and repeat bp to manage for possible post obstructive diuresis   repeat us 24-48 hours to re evaluate hydro  flomax with monitoring of bp and postural changes    outpatient gu fu and management

## 2022-03-03 NOTE — CONSULT NOTE ADULT - SUBJECTIVE AND OBJECTIVE BOX
CHIEF COMPLAINT:    HISTORY OF PRESENT ILLNESS:  71 year-old man with history of hypertension and type 2 diabetes. He presented overnight to the [facility] ER with loss of appetite for the past 2 weeks, in associated with impaired small/impaired taste. He admits to intermittent vomiting, but denies nausea; he admits to feeling cold all the time. Noted on admission to have GINA with a creatinine of 1.4mg/dL; therefore a renal consultation was requested. Noted on CT I+ in the ER to have bilateral hydro to level of bladder, consistent with bladder outlet obstruction. Berry placed in ER with sudden 600cc urine output.  No chest pain, shortness of breath or palpitations.   s/p Berry in ER  No chest pain or shortness of breath       PAST MEDICAL & SURGICAL HISTORY:    None       MEDICATIONS:    None           sodium chloride 0.45% with potassium chloride 20 mEq/L 1000 milliLiter(s) IV Continuous <Continuous>      FAMILY HISTORY:      SOCIAL HISTORY:    [ ] Non-smoker  [ ] Smoker  [ ] Alcohol    Allergies    No Known Allergies    Intolerances    	    REVIEW OF SYSTEMS:  CONSTITUTIONAL: No fever, weight loss, + weight loss and fatigue  EYES: No eye pain, visual disturbances, or discharge  ENMT:  No difficulty hearing, tinnitus, vertigo; No sinus or throat pain  NECK: No pain or stiffness  RESPIRATORY: No cough, wheezing, chills or hemoptysis; No Shortness of Breath  CARDIOVASCULAR: No chest pain, palpitations, passing out, dizziness, or leg swelling  GASTROINTESTINAL: No abdominal or epigastric pain. No nausea, vomiting, or hematemesis; No diarrhea or constipation. No melena or hematochezia.  GENITOURINARY: No dysuria, frequency, hematuria, or incontinence  NEUROLOGICAL: No headaches, memory loss, loss of strength, numbness, or tremors  SKIN: No itching, burning, rashes, or lesions   LYMPH Nodes: No enlarged glands  ENDOCRINE: No heat or cold intolerance; No hair loss  MUSCULOSKELETAL: No joint pain or swelling; No muscle, back, or extremity pain  PSYCHIATRIC: No depression, anxiety, mood swings, or difficulty sleeping  HEME/LYMPH: No easy bruising, or bleeding gums  ALLERY AND IMMUNOLOGIC: No hives or eczema	    [ ] All others negative	  [ ] Unable to obtain    PHYSICAL EXAM:  T(C): 36.8 (03-03-22 @ 07:43), Max: 36.8 (03-03-22 @ 07:43)  HR: 83 (03-03-22 @ 07:43) (78 - 105)  BP: 130/78 (03-03-22 @ 07:43) (129/83 - 138/89)  RR: 16 (03-03-22 @ 07:43) (16 - 20)  SpO2: 99% (03-03-22 @ 07:43) (97% - 99%)  Wt(kg): --  I&O's Summary    03 Mar 2022 07:01  -  03 Mar 2022 10:20  --------------------------------------------------------  IN: 0 mL / OUT: 600 mL / NET: -600 mL        Appearance: NAD  HEENT:  Dry  oral mucosa, PERRL, EOMI	  Lymphatic: No lymphadenopathy  Cardiovascular: Normal S1 S2, No JVD, No murmurs, No edema  Respiratory: Lungs clear to auscultation	  Psychiatry: A & O x 3, Mood & affect appropriate  Gastrointestinal:  Soft, Non-tender, + BS	  Skin: No rashes, No ecchymoses, No cyanosis	  Neurologic: Non-focal  Extremities: Normal range of motion, No clubbing, cyanosis or edema  Vascular: Peripheral pulses palpable 2+ bilaterally  +berry       TELEMETRY: SR 	    ECG:  	NSR non specific stt changes   RADIOLOGY:  < from: CT Abdomen and Pelvis w/ IV Cont (03.03.22 @ 06:09) >    ACC: [id number] EXAM:  CT ABDOMEN AND PELVIS IC                        ACC: [id number] EXAM:  CT CHEST IC                          PROCEDURE DATE:  03/03/2022          INTERPRETATION:  CLINICAL INFORMATION: Dysphagia and weight loss.   Evaluate for malignancy.    COMPARISON: None.    CONTRAST/COMPLICATIONS:  IV Contrast: Omnipaque 350   90 cc administered   10 cc discarded  Oral Contrast: NONE  Complications: None reported at time of study completion    PROCEDURE:  CT of the Chest, Abdomen and Pelvis was performed.  Sagittal and coronal reformats were performed.    FINDINGS:  CHEST:  LUNGS AND LARGE AIRWAYS: Patent central airways. No pulmonary nodules.  PLEURA: No pleural effusion.  VESSELS: Calcification of the aorta.  HEART: Heart size is normal. No pericardial effusion. Coronary artery   calcification.  MEDIASTINUM AND MEERA: Scattered paratracheal and subcarinal lymph nodes.  CHEST WALL AND LOWER NECK: 1.2 cm low-attenuation focus in the left   thyroid lobe.    ABDOMEN AND PELVIS:  LIVER: Within normal limits.  BILE DUCTS: Normal caliber.  GALLBLADDER: Within normal limits.  SPLEEN: Within normal limits.  PANCREAS: Within normal limits.  ADRENALS: Within normal limits.  KIDNEYS/URETERS: There is moderate bilateral hydroureteronephrosis to the   level of the urinary bladder.    BLADDER: Thickened in appearance secondary to chronic outlet obstruction.   Distended with urine.  REPRODUCTIVE ORGANS: Prostate gland is enlarged and exerts mass effect on   the bladder base.    BOWEL: Nobowel obstruction. Appendix is normal.  PERITONEUM: No ascites.  VESSELS: Hepatic veins and main portal vein are patent. Vascular   calcification of the aorta and its branches.  RETROPERITONEUM/LYMPH NODES: No lymphadenopathy.  ABDOMINAL WALL: Withinnormal limits.  BONES: Degenerative changes. Sclerotic focus in the T4 vertebral body,   probably bone island.    IMPRESSION:    No evidence of malignancy in the chest, abdomen and pelvis.    Moderate bilateral hydroureteronephrosis to the level of the urinary   bladder, which is distended with urine likely secondary to bladder outlet   obstruction.    --- End of Report ---      < end of copied text >  < from: CT Head No Cont (03.03.22 @ 05:35) >    ACC: [id number] EXAM:  CT BRAIN                          PROCEDURE DATE:  03/03/2022          INTERPRETATION:  HISTORY: Weight loss.    COMPARISON: None.    TECHNIQUE: Axial noncontrast CT images from the skull base to the vertex   were obtained and submitted for interpretation. Coronal and sagittal   reformatted images were performed. Bone and soft tissue windows were   evaluated.    FINDINGS:    There is no acute intracranial mass-effect, hemorrhage, midline shift, or   abnormal extra-axial fluid collection. Gray-white differentiation is   maintained.    Ventricles, sulci, and cisterns are normal in size for the patient's age   without hydrocephalus. Basal cisterns are patent.    Visualized paranasal sinuses and mastoid air cells are clear.Calvarium   is intact.    IMPRESSION:    No acute intracranial bleeding.    --- End of Report ---    < end of copied text >    OTHER: 	  	  LABS:	 	    CARDIAC MARKERS:                                  16.7   8.73  )-----------( 208      ( 03 Mar 2022 04:07 )             51.7     03-03    144  |  103  |  27<H>  ----------------------------<  74  3.7   |  25  |  1.40<H>    Ca    9.9      03 Mar 2022 04:07  Phos  3.7     03-03  Mg     2.0     03-03    TPro  7.4  /  Alb  4.5  /  TBili  0.9  /  DBili  x   /  AST  23  /  ALT  21  /  AlkPhos  73  03-03    proBNP:   Lipid Profile:   HgA1c:   TSH: Thyroid Stimulating Hormone, Serum: 0.94 uIU/mL (03-03 @ 06:26)

## 2022-03-03 NOTE — PATIENT PROFILE ADULT - FALL HARM RISK - HARM RISK INTERVENTIONS
Assistance with ambulation/Assistance OOB with selected safe patient handling equipment/Communicate Risk of Fall with Harm to all staff/Discuss with provider need for PT consult/Monitor gait and stability/Reinforce activity limits and safety measures with patient and family/Tailored Fall Risk Interventions/Visual Cue: Yellow wristband and red socks/Bed in lowest position, wheels locked, appropriate side rails in place/Call bell, personal items and telephone in reach/Instruct patient to call for assistance before getting out of bed or chair/Non-slip footwear when patient is out of bed/[place] to call system/Physically safe environment - no spills, clutter or unnecessary equipment/Purposeful Proactive Rounding/Room/bathroom lighting operational, light cord in reach

## 2022-03-03 NOTE — H&P ADULT - HISTORY OF PRESENT ILLNESS
71 year-old man with history of hypertension and type 2 diabetes. He presented overnight to the [facility] ER with loss of appetite for the past 2 weeks. He admits to intermittent vomiting, but denies nausea; he admits to feeling cold all the time. Noted on admission to have GINA with a creatinine of 1.4mg/dL.  pan CT was done in the ED and an urinary bladder outlet obstruction with bilateral [place] was identified.  Ramírez placed in ER with sudden 600cc urine output. He admits to urinary frequency for the past 2 weeks; denies known history of CKD/GINA.

## 2022-03-03 NOTE — CONSULT NOTE ADULT - SUBJECTIVE AND OBJECTIVE BOX
HPI: Mr. Carpenter is a 71 year-old man with history of hypertension and type 2 diabetes. He presented overnight to the [facility] ER with loss of appetite for the past 2 weeks, in associated with impaired small/impaired taste. He admits to intermittent vomiting, but denies nausea; he admits to feeling cold all the time. Noted on admission to have GINA with a creatinine of 1.4mg/dL; therefore a renal consultation was requested. Noted on CT I+ in the ER to have bilateral hydro to level of bladder, consistent with bladder outlet obstruction. Berry placed in ER with sudden 600cc urine output.    PAST MEDICAL & SURGICAL HISTORY:  HTN  DM2      Allergies  No Known Allergies    SOCIAL HISTORY:  Denies ETOh,Smoking,     FAMILY HISTORY:  No CKD    REVIEW OF SYSTEMS:  CONSTITUTIONAL: (+)loss of appetite; (+)abnormal taste/abnormal smell  EYES/ENT: No visual changes;  No vertigo or throat pain   NECK: No pain or stiffness  RESPIRATORY: No cough, wheezing, hemoptysis; No shortness of breath  CARDIOVASCULAR: No chest pain or palpitations  GASTROINTESTINAL: (+)vomiting (no nausea)  GENITOURINARY: No dysuria, frequency or hematuria  NEUROLOGICAL: No numbness or weakness  SKIN: No itching, burning, rashes, or lesions   All other review of systems is negative unless indicated above.    VITAL:  T(C): , Max: 36.8 (03-03-22 @ 07:43)  T(F): , Max: 98.2 (03-03-22 @ 07:43)  HR: 83 (03-03-22 @ 07:43)  BP: 130/78 (03-03-22 @ 07:43)  RR: 16 (03-03-22 @ 07:43)  SpO2: 99% (03-03-22 @ 07:43)    PHYSICAL EXAM:  Constitutional: NAD, Alert  HEENT: NCAT, MMM  Neck: Supple, No JVD  Respiratory: CTA-b/l  Cardiovascular: RRR s1s2, no m/r/g  Gastrointestinal: BS+, soft, NT/ND  Extremities: No peripheral edema b/l  Neurological: no focal deficits; strength grossly intact  Back: no CVAT b/l  Skin: No rashes, no nevi    LABS:                        16.7   8.73  )-----------( 208      ( 03 Mar 2022 04:07 )             51.7     Na(144)/K(3.7)/Cl(103)/HCO3(25)/BUN(27)/Cr(1.40)Glu(74)/Ca(9.9)/Mg(2.0)/PO4(3.7)    03-03 @ 04:07      IMAGING:  CT A/P I+ 3/3/22 - mod hydronephrosis/hydroureter to level of bladder      ASSESSMENT:  (1)Renal - GINA - obstructive uropathy - should improve s/p berry placement  (2)Loss of taste and smell - etiology?    RECOMMEND:  (1)Berry to gravity  (2)1/2NS+ 20meq/L KCL, 50cc/h  (3)Strict I/Os  (4)Dose new meds for GFR 40-50ml/min   (5)BMP+Mg daily  (6)Workup of loss of taste and smell per primary team    Thank you for involving [facility] in this patient's care.    With warm regards,    Harjit Sheldon MD   [facility]  Office: [phone]  Cell: [phone]             HPI: Mr. Carpenter is a 71 year-old man with history of hypertension and type 2 diabetes. He presented overnight to the [facility] ER with loss of appetite for the past 2 weeks, in associated with impaired small/impaired taste. He admits to intermittent vomiting, but denies nausea; he admits to feeling cold all the time. Noted on admission to have GINA with a creatinine of 1.4mg/dL; therefore a renal consultation was requested. Noted on CT I+ in the ER to have bilateral hydro to level of bladder, consistent with bladder outlet obstruction. Berry placed in ER with sudden 600cc urine output.    PAST MEDICAL & SURGICAL HISTORY:  HTN  DM2      Allergies  No Known Allergies    SOCIAL HISTORY:  Denies ETOh,Smoking,     FAMILY HISTORY:  No CKD    REVIEW OF SYSTEMS:  CONSTITUTIONAL: (+)loss of appetite; (+)abnormal taste/abnormal smell  EYES/ENT: No visual changes;  No vertigo or throat pain   NECK: No pain or stiffness  RESPIRATORY: No cough, wheezing, hemoptysis; No shortness of breath  CARDIOVASCULAR: No chest pain or palpitations  GASTROINTESTINAL: (+)vomiting (no nausea)  GENITOURINARY: No dysuria, frequency or hematuria  NEUROLOGICAL: No numbness or weakness  SKIN: No itching, burning, rashes, or lesions   All other review of systems is negative unless indicated above.    VITAL:  T(C): , Max: 36.8 (03-03-22 @ 07:43)  T(F): , Max: 98.2 (03-03-22 @ 07:43)  HR: 83 (03-03-22 @ 07:43)  BP: 130/78 (03-03-22 @ 07:43)  RR: 16 (03-03-22 @ 07:43)  SpO2: 99% (03-03-22 @ 07:43)    PHYSICAL EXAM:  Constitutional: NAD, Alert  HEENT: NCAT, MMM  Neck: Supple, No JVD  Respiratory: CTA-b/l  Cardiovascular: RRR s1s2, no m/r/g  Gastrointestinal: BS+, soft, NT/ND  Extremities: No peripheral edema b/l  Neurological: no focal deficits; strength grossly intact  Back: no CVAT b/l  Skin: No rashes, no nevi    LABS:                        16.7   8.73  )-----------( 208      ( 03 Mar 2022 04:07 )             51.7     Na(144)/K(3.7)/Cl(103)/HCO3(25)/BUN(27)/Cr(1.40)Glu(74)/Ca(9.9)/Mg(2.0)/PO4(3.7)    03-03 @ 04:07      IMAGING:  CT A/P I+ 3/3/22 - mod hydronephrosis/hydroureter to level of bladder      ASSESSMENT:  (1)Renal - GINA - obstructive uropathy - should improve s/p berry placement  (2)Loss of taste and smell - etiology?    RECOMMEND:  (1)Berry to gravity  (2)1/2NS+ 20meq/L KCL, 50cc/h  (3)Strict I/Os  (4)Dose new meds for GFR 40-50ml/min   (5)BMP+Mg daily  (6)Urine: UA, protein, creatinine  (7)Workup of loss of taste and smell per primary team    Thank you for involving [facility] in this patient's care.    With warm regards,    Harjit Sheldon MD   [facility]  Office: [phone]  Cell: [phone]             HPI: Mr. Carpenter is a 71 year-old man with history of hypertension and type 2 diabetes. He presented overnight to the [facility] ER with loss of appetite for the past 2 weeks, in associated with impaired small/impaired taste. He admits to intermittent vomiting, but denies nausea; he admits to feeling cold all the time. Noted on admission to have GINA with a creatinine of 1.4mg/dL; therefore a renal consultation was requested. Noted on CT I+ in the ER to have bilateral hydro to level of bladder, consistent with bladder outlet obstruction. Berry placed in ER with sudden 600cc urine output. He admits to urinary frequency for the past 2 weeks; denies known history of CKD/GINA.     PAST MEDICAL & SURGICAL HISTORY:  HTN  DM2      Allergies  No Known Allergies    SOCIAL HISTORY:  Denies ETOh,Smoking,     FAMILY HISTORY:  No CKD    REVIEW OF SYSTEMS:  CONSTITUTIONAL: (+)loss of appetite; (+)abnormal taste/abnormal smell  EYES/ENT: No visual changes;  No vertigo or throat pain   NECK: No pain or stiffness  RESPIRATORY: No cough, wheezing, hemoptysis; No shortness of breath  CARDIOVASCULAR: No chest pain or palpitations  GASTROINTESTINAL: (+)vomiting (no nausea)  GENITOURINARY: (+)frequency  NEUROLOGICAL: No numbness or weakness  SKIN: No itching, burning, rashes, or lesions   All other review of systems is negative unless indicated above.    VITAL:  T(C): , Max: 36.8 (03-03-22 @ 07:43)  T(F): , Max: 98.2 (03-03-22 @ 07:43)  HR: 83 (03-03-22 @ 07:43)  BP: 130/78 (03-03-22 @ 07:43)  RR: 16 (03-03-22 @ 07:43)  SpO2: 99% (03-03-22 @ 07:43)    PHYSICAL EXAM:  Constitutional: NAD, Alert  HEENT: NCAT, MMM  Neck: Supple, No JVD  Respiratory: CTA-b/l  Cardiovascular: RRR s1s2, no m/r/g  Gastrointestinal: BS+, soft, NT/ND  : (+)berry  Extremities: No peripheral edema b/l  Neurological: no focal deficits; strength grossly intact  Back: no CVAT b/l  Skin: No rashes, no nevi    LABS:                        16.7   8.73  )-----------( 208      ( 03 Mar 2022 04:07 )             51.7     Na(144)/K(3.7)/Cl(103)/HCO3(25)/BUN(27)/Cr(1.40)Glu(74)/Ca(9.9)/Mg(2.0)/PO4(3.7)    03-03 @ 04:07      IMAGING:  CT A/P I+ 3/3/22 - mod hydronephrosis/hydroureter to level of bladder      ASSESSMENT:  (1)Renal - GINA - obstructive uropathy - should improve s/p berry placement  (2)Loss of taste and smell - etiology?    RECOMMEND:  (1)Berry to gravity  (2)1/2NS+ 20meq/L KCL, 50cc/h  (3)Strict I/Os  (4)Dose new meds for GFR 40-50ml/min   (5)BMP+Mg daily  (6)Urine: UA, protein, creatinine  (7)Workup of loss of taste and smell per primary team    Thank you for involving [facility] in this patient's care.    With warm regards,    Harjit Sheldon MD   [facility]  Office: [phone]  Cell: [phone]

## 2022-03-03 NOTE — ED ADULT NURSE REASSESSMENT NOTE - NS ED NURSE REASSESS COMMENT FT1
Assumed care of pt resting in bed, berry cath placed. Draining yellow urine. NAD. RR even and unlabored. Awaiting bed placement

## 2022-03-03 NOTE — H&P ADULT - NSHPPHYSICALEXAM_GEN_ALL_CORE
T(F): 98 (03-03-22 @ 11:33), Max: 98.2 (03-03-22 @ 07:43)  HR: 73 (03-03-22 @ 11:33) (73 - 105)  BP: 138/81 (03-03-22 @ 11:33) (129/83 - 138/89)  RR: 18 (03-03-22 @ 11:33) (16 - 20)  SpO2: 98% (03-03-22 @ 11:33) (97% - 99%)      PHYSICAL EXAM:  GENERAL: NAD, well-developed  HEAD:  Atraumatic, Normocephalic  EYES: EOMI, PERRLA, conjunctiva and sclera clear  NECK: Supple, No JVD  CHEST/LUNG: Clear to auscultation bilaterally; No wheeze  HEART: Regular rate and rhythm; No murmurs, rubs, or gallops  ABDOMEN: Soft, Nontender, Nondistended; Bowel sounds present  EXTREMITIES:  2+ Peripheral Pulses, No clubbing, cyanosis, or edema  PSYCH: AAOx3  NEUROLOGY: non-focal  SKIN: No rashes or lesions

## 2022-03-03 NOTE — CONSULT NOTE ADULT - ASSESSMENT
71 year-old man with history of hypertension and type 2 diabetes. He presented overnight to the [facility] ER with loss of appetite for the past 2 weeks, in associated with impaired small/impaired taste. He admits to intermittent vomiting, but denies nausea; he admits to feeling cold all the time. Noted on admission to have GINA with a creatinine of 1.4mg/dL; therefore a renal consultation was requested. Noted on CT I+ in the ER to have bilateral hydro to level of bladder, consistent with bladder outlet obstruction. Ramírez placed in ER with sudden 600cc urine output.  No chest pain, shortness of breath or palpitations.   s/p Ramírez in ER

## 2022-03-03 NOTE — ED PROVIDER NOTE - ATTENDING CONTRIBUTION TO CARE
Afebrile. Awake and Alert. Lungs CTA. Heart RRR. Abdomen soft NTND. CN II-XII grossly intact. Moves all extremities without lateralization.    r/o infection  r/o malignancy Afebrile. Awake and Alert. Lungs CTA. Heart RRR. Abdomen soft NTND. CN II-XII grossly intact. Moves all extremities without lateralization.    r/o infection  r/o malignancy (20 lbs weight loss in one week)  Patient reports food lacks taste, daughter notes he runs to bathroom right after eating and vomits  FTT, recent dx of DM

## 2022-03-03 NOTE — H&P ADULT - ASSESSMENT
72 yo male patient with BPH, DM, HTN comes in with acute/ chronic urinary retention with acute kidney injury  berry in place, seen by renal and   place on Lantus and insulin on a sliding scale  zofran for nausea, IVF as per renal  cont outptn meds, would hold Losartan 2/2 GINA  trend BPs  ptn will likely need to dc with berry to allow recovery  will need outptn  gu intervention  encourage po fluids and repeat bp to manage for possible post obstructive diuresis   repeat us 24-48 hours to re evaluate hydro  flomax with monitoring of bp and postural changes  dvt ppx w sc lovenox 70 yo male patient with BPH, DM, HTN comes in with acute/ chronic urinary retention with acute kidney injury  berry in place, seen by renal and   place on Lantus and insulin on a sliding scale  zofran for nausea, IVF as per renal  cont outptn meds, would hold Losartan 2/2 GINA  trend BPs  ptn will likely need to dc with berry to allow recovery  will need outptn  gu intervention  encourage po fluids and repeat bp to manage for possible post obstructive diuresis   repeat us 24-48 hours to re evaluate hydro  flomax with monitoring of bp and postural changes  dvt ppx w HSC

## 2022-03-03 NOTE — ED PROVIDER NOTE - OBJECTIVE STATEMENT
70yo M hx of DM2 comes to ED for anorexia. Pt for last 2 weeks has not been POing because things taste and smell off to him. Vomits some food because of the above, not because of pain or nausea. COVID vax x 2, no fever, cp, sob, abdominal pain, change in urine. no BM because he hasn't eaten. Complains of feeling cold all the time.

## 2022-03-03 NOTE — ED ADULT NURSE REASSESSMENT NOTE - NS ED NURSE REASSESS COMMENT FT1
Ramírez catheter inserted using sterile technique. Second RN present to confirm sterility. Bedside drainage to gravity. Stat lock in place. 600cc of clear yellow urine outputted on intial insertion.

## 2022-03-03 NOTE — ED ADULT NURSE NOTE - OBJECTIVE STATEMENT
Patient is a 71y male presenting to the ED ambulatory from home with c/o chills. Patient A&Ox4. Patient reports having decreased appetite, loss of smell, a change in taste, nausea, weight loss and generalized fatigue x 2 weeks. Reports being unable to eat due to the change in taste, states anytime he eats he "can't swallow the food" because he gets nauseous and does not like the taste of the food. Patient also reports having intermittent "chills" for the past 2-3 days. Patient is a 71y male presenting to the ED ambulatory from home with c/o chills. Patient A&Ox4. Patient speaking coherently in full sentences. Reports having decreased appetite, loss of smell, a change in taste, nausea, weight loss and generalized fatigue x 2 weeks. Reports being unable to eat due to the change in taste, states anytime he eats he "can't swallow the food" because he gets nauseous and does not like the taste of the food. Patient also reports having intermittent "chills" for the past 2-3 days. Respirations spontaneous, even, and non-labored. Abdomen soft, non-distended and non-tender upon palpation. No swelling noted in bilateral lower extremities. Denies chest pain, SOB, headache, vomiting, diarrhea, abdominal pain, burning upon urination or difficulty urinating, hematuria.

## 2022-03-03 NOTE — CONSULT NOTE ADULT - ASSESSMENT
71 year-old man with history of hypertension and type 2 diabetes. He presented overnight to the [facility] ER with loss of appetite for the past 2 weeks. He admits to intermittent vomiting, but denies nausea; he admits to feeling cold all the time. Noted on admission to have GINA with a creatinine of 1.4mg/dL.  pan CT was done in the ED and an urinary bladder outlet obstruction with bilateral [place] was identified.  Ramírez placed in ER with sudden 600cc urine output. He admits to urinary frequency for the past 2 weeks; denies known history of CKD/GINA.   pt was recently admitted sec to uncontrolled dm and is scheduled for stress test this coming Monday.  no chest pain.  asa daily  continue lipitor  urinary retention, urology noted  will re start losartan as renal function improves

## 2022-03-04 DIAGNOSIS — E11.9 TYPE 2 DIABETES MELLITUS WITHOUT COMPLICATIONS: ICD-10-CM

## 2022-03-04 DIAGNOSIS — R33.9 RETENTION OF URINE, UNSPECIFIED: ICD-10-CM

## 2022-03-04 DIAGNOSIS — E04.1 NONTOXIC SINGLE THYROID NODULE: ICD-10-CM

## 2022-03-04 DIAGNOSIS — E78.5 HYPERLIPIDEMIA, UNSPECIFIED: ICD-10-CM

## 2022-03-04 LAB
ANION GAP SERPL CALC-SCNC: 21 MMOL/L — HIGH (ref 5–17)
BUN SERPL-MCNC: 20 MG/DL — SIGNIFICANT CHANGE UP (ref 7–23)
CALCIUM SERPL-MCNC: 9.2 MG/DL — SIGNIFICANT CHANGE UP (ref 8.4–10.5)
CHLORIDE SERPL-SCNC: 105 MMOL/L — SIGNIFICANT CHANGE UP (ref 96–108)
CO2 SERPL-SCNC: 19 MMOL/L — LOW (ref 22–31)
CREAT SERPL-MCNC: 1.21 MG/DL — SIGNIFICANT CHANGE UP (ref 0.5–1.3)
CULTURE RESULTS: NO GROWTH — SIGNIFICANT CHANGE UP
EGFR: 64 ML/MIN/1.73M2 — SIGNIFICANT CHANGE UP
GLUCOSE BLDC GLUCOMTR-MCNC: 101 MG/DL — HIGH (ref 70–99)
GLUCOSE BLDC GLUCOMTR-MCNC: 112 MG/DL — HIGH (ref 70–99)
GLUCOSE BLDC GLUCOMTR-MCNC: 114 MG/DL — HIGH (ref 70–99)
GLUCOSE BLDC GLUCOMTR-MCNC: 149 MG/DL — HIGH (ref 70–99)
GLUCOSE SERPL-MCNC: 96 MG/DL — SIGNIFICANT CHANGE UP (ref 70–99)
HCT VFR BLD CALC: 52.1 % — HIGH (ref 39–50)
HGB BLD-MCNC: 16.9 G/DL — SIGNIFICANT CHANGE UP (ref 13–17)
MCHC RBC-ENTMCNC: 28.4 PG — SIGNIFICANT CHANGE UP (ref 27–34)
MCHC RBC-ENTMCNC: 32.4 GM/DL — SIGNIFICANT CHANGE UP (ref 32–36)
MCV RBC AUTO: 87.4 FL — SIGNIFICANT CHANGE UP (ref 80–100)
NRBC # BLD: 0 /100 WBCS — SIGNIFICANT CHANGE UP (ref 0–0)
PLATELET # BLD AUTO: 187 K/UL — SIGNIFICANT CHANGE UP (ref 150–400)
POTASSIUM SERPL-MCNC: 3.9 MMOL/L — SIGNIFICANT CHANGE UP (ref 3.5–5.3)
POTASSIUM SERPL-SCNC: 3.9 MMOL/L — SIGNIFICANT CHANGE UP (ref 3.5–5.3)
RBC # BLD: 5.96 M/UL — HIGH (ref 4.2–5.8)
RBC # FLD: 13.2 % — SIGNIFICANT CHANGE UP (ref 10.3–14.5)
SODIUM SERPL-SCNC: 145 MMOL/L — SIGNIFICANT CHANGE UP (ref 135–145)
SPECIMEN SOURCE: SIGNIFICANT CHANGE UP
WBC # BLD: 8.61 K/UL — SIGNIFICANT CHANGE UP (ref 3.8–10.5)
WBC # FLD AUTO: 8.61 K/UL — SIGNIFICANT CHANGE UP (ref 3.8–10.5)

## 2022-03-04 RX ORDER — TAMSULOSIN HYDROCHLORIDE 0.4 MG/1
0.4 CAPSULE ORAL AT BEDTIME
Refills: 0 | Status: DISCONTINUED | OUTPATIENT
Start: 2022-03-04 | End: 2022-03-07

## 2022-03-04 RX ADMIN — HEPARIN SODIUM 5000 UNIT(S): 5000 INJECTION INTRAVENOUS; SUBCUTANEOUS at 05:29

## 2022-03-04 RX ADMIN — HEPARIN SODIUM 5000 UNIT(S): 5000 INJECTION INTRAVENOUS; SUBCUTANEOUS at 18:40

## 2022-03-04 RX ADMIN — TAMSULOSIN HYDROCHLORIDE 0.4 MILLIGRAM(S): 0.4 CAPSULE ORAL at 22:19

## 2022-03-04 RX ADMIN — Medication 81 MILLIGRAM(S): at 12:24

## 2022-03-04 RX ADMIN — DEXTROSE MONOHYDRATE, SODIUM CHLORIDE, AND POTASSIUM CHLORIDE 50 MILLILITER(S): 50; .745; 4.5 INJECTION, SOLUTION INTRAVENOUS at 12:24

## 2022-03-04 RX ADMIN — ATORVASTATIN CALCIUM 20 MILLIGRAM(S): 80 TABLET, FILM COATED ORAL at 22:19

## 2022-03-04 NOTE — PROGRESS NOTE ADULT - ASSESSMENT
70 yo M   recnetly dx  dm.  was   seen by  sher,  now returns   No abdominal pain, no fever, no chest pain, no sob no dysuria.  COVID vaccinated no sick contacts.      pt  was   d/.c  by  myself on 2/24/22,  and  was  seen by  danya guevara  and  sher littlejohn,  who  will be  f/p  on pt          c/o   with loss of appetite for the past 2 weeks, in associated with impaired small/impaired taste.   He admits to intermittent vomiting, but denies nausea;    Noted on admission to have GINA with a creatinine of 1.4    CT I+ in the ER to have bilateral hydro to level of bladder, consistent with bladder outlet obstruction.    Ramírez placed in ER with sudden 600cc urine output.  seen by urology  on  flomax  DM,  follow  fs   on asa, lipitor   rx plan per sher littlejohn. /  given his  fs,  lantus  was  stopped   on iv fluids on  dvt ppx  tov  in  2  days

## 2022-03-04 NOTE — CONSULT NOTE ADULT - SUBJECTIVE AND OBJECTIVE BOX
HPI:  71 year-old man with history of hypertension and type 2 diabetes. He presented overnight to the [facility] ER with loss of appetite for the past 2 weeks. He admits to intermittent vomiting, but denies nausea; he admits to feeling cold all the time. Noted on admission to have GINA with a creatinine of 1.4mg/dL.  pan CT was done in the ED and an urinary bladder outlet obstruction with bilateral [place] was identified.  Ramírez placed in ER with sudden 600cc urine output. He admits to urinary frequency for the past 2 weeks; denies known history of CKD/GINA.  (03 Mar 2022 16:22)    Patient known to Service from recent hospital admission, has recently diagnosed diabetes, A1C 11.5%, was DC on Lantus and Metformin.  Endo was consulted for glycemic control.    PAST MEDICAL & SURGICAL HISTORY:      FAMILY HISTORY:      Social History:    Outpatient Medications:    MEDICATIONS  (STANDING):  aspirin enteric coated 81 milliGRAM(s) Oral daily  atorvastatin 20 milliGRAM(s) Oral at bedtime  dextrose 40% Gel 15 Gram(s) Oral once  dextrose 5%. 1000 milliLiter(s) (50 mL/Hr) IV Continuous <Continuous>  dextrose 5%. 1000 milliLiter(s) (100 mL/Hr) IV Continuous <Continuous>  dextrose 50% Injectable 25 Gram(s) IV Push once  dextrose 50% Injectable 12.5 Gram(s) IV Push once  dextrose 50% Injectable 25 Gram(s) IV Push once  glucagon  Injectable 1 milliGRAM(s) IntraMuscular once  heparin   Injectable 5000 Unit(s) SubCutaneous every 12 hours  insulin lispro (ADMELOG) corrective regimen sliding scale   SubCutaneous three times a day before meals  sodium chloride 0.45% with potassium chloride 20 mEq/L 1000 milliLiter(s) (50 mL/Hr) IV Continuous <Continuous>  tamsulosin 0.4 milliGRAM(s) Oral at bedtime    MEDICATIONS  (PRN):      Allergies    No Known Allergies    Intolerances      Review of Systems:  Constitutional: No fever, no chills  Eyes: No blurry vision  Neuro: No tremors  HEENT: No pain, no neck swelling  Cardiovascular: No chest pain, no palpitations  Respiratory: Has SOB, no cough  GI: No nausea, vomiting, abdominal pain  : No dysuria  Skin: no rash  MSK: Has leg swelling.  Psych: no depression  Endocrine: no polyuria, polydipsia    ALL OTHER SYSTEMS REVIEWED AND NEGATIVE    UNABLE TO OBTAIN    PHYSICAL EXAM:  VITALS: T(C): 36.7 (03-04-22 @ 14:29)  T(F): 98.1 (03-04-22 @ 14:29), Max: 98.5 (03-04-22 @ 10:01)  HR: 69 (03-04-22 @ 14:29) (66 - 76)  BP: 125/67 (03-04-22 @ 14:29) (113/71 - 155/73)  RR:  (17 - 18)  SpO2:  (96% - 100%)  Wt(kg): --  GENERAL: NAD, well-groomed, well-developed  EYES: No proptosis, no lid lag  HEENT:  Atraumatic, Normocephalic  THYROID: Normal size, no palpable nodules  RESPIRATORY: Clear to auscultation bilaterally; No rales, rhonchi, wheezing  CARDIOVASCULAR: Si S2, No murmurs;  GI: Soft, non distended, normal bowel sounds  SKIN: Dry, intact, No rashes or lesions  MUSCULOSKELETAL: Has BL lower extremity edema.  NEURO:  no tremor, sensation decreased in feet BL,    POCT Blood Glucose.: 114 mg/dL (03-04-22 @ 12:48)  POCT Blood Glucose.: 101 mg/dL (03-04-22 @ 07:57)  POCT Blood Glucose.: 76 mg/dL (03-03-22 @ 21:16)  POCT Blood Glucose.: 72 mg/dL (03-03-22 @ 03:14)                            16.9   8.61  )-----------( 187      ( 04 Mar 2022 07:04 )             52.1       03-04    145  |  105  |  20  ----------------------------<  96  3.9   |  19<L>  |  1.21    EGFR if : x   EGFR if non : x     Ca    9.2      03-04  Mg     2.0     03-03  Phos  3.7     03-03    TPro  7.4  /  Alb  4.5  /  TBili  0.9  /  DBili  x   /  AST  23  /  ALT  21  /  AlkPhos  73  03-03      Thyroid Function Tests:  03-03 @ 06:26 TSH 0.94 FreeT4 1.2 T3 -- Anti TPO -- Anti Thyroglobulin Ab -- TSI --  02-21 @ 09:06 TSH 0.76 FreeT4 -- T3 -- Anti TPO -- Anti Thyroglobulin Ab -- TSI --          02-22 Chol 196 Direct LDL -- LDL calculated 111<H> HDL 69 Trig 84, 02-21 Chol 217<H> Direct LDL -- LDL calculated 123<H> HDL 76 Trig 92    Radiology:                HPI:  71 year-old man with history of hypertension and type 2 diabetes. He presented overnight to the [facility] ER with loss of appetite for the past 2 weeks. He admits to intermittent vomiting, but denies nausea; he admits to feeling cold all the time. Noted on admission to have GINA with a creatinine of 1.4mg/dL.  pan CT was done in the ED and an urinary bladder outlet obstruction with bilateral [place] was identified.  Ramírez placed in ER with sudden 600cc urine output. He admits to urinary frequency for the past 2 weeks; denies known history of CKD/GINA.  (03 Mar 2022 16:22)    Patient known to Service from recent hospital admission, has recently diagnosed diabetes, A1C 11.5%, was DC on Lantus and Metformin.  Endo was consulted for glycemic control.    PAST MEDICAL & SURGICAL HISTORY:      FAMILY HISTORY:      Social History:    Outpatient Medications:    MEDICATIONS  (STANDING):  aspirin enteric coated 81 milliGRAM(s) Oral daily  atorvastatin 20 milliGRAM(s) Oral at bedtime  dextrose 40% Gel 15 Gram(s) Oral once  dextrose 5%. 1000 milliLiter(s) (50 mL/Hr) IV Continuous <Continuous>  dextrose 5%. 1000 milliLiter(s) (100 mL/Hr) IV Continuous <Continuous>  dextrose 50% Injectable 25 Gram(s) IV Push once  dextrose 50% Injectable 12.5 Gram(s) IV Push once  dextrose 50% Injectable 25 Gram(s) IV Push once  glucagon  Injectable 1 milliGRAM(s) IntraMuscular once  heparin   Injectable 5000 Unit(s) SubCutaneous every 12 hours  insulin lispro (ADMELOG) corrective regimen sliding scale   SubCutaneous three times a day before meals  sodium chloride 0.45% with potassium chloride 20 mEq/L 1000 milliLiter(s) (50 mL/Hr) IV Continuous <Continuous>  tamsulosin 0.4 milliGRAM(s) Oral at bedtime    MEDICATIONS  (PRN):      Allergies    No Known Allergies    Intolerances      Review of Systems:  Constitutional: No fever, no chills  Eyes: No blurry vision  Neuro: No tremors  HEENT: No pain, no neck swelling  Cardiovascular: No chest pain, no palpitations  Respiratory: Has SOB, no cough  GI: No nausea, vomiting, abdominal pain  : No dysuria  Skin: no rash  MSK: Has leg swelling.  Psych: no depression  Endocrine: no polyuria, polydipsia    ALL OTHER SYSTEMS REVIEWED AND NEGATIVE    UNABLE TO OBTAIN    PHYSICAL EXAM:  VITALS: T(C): 36.7 (03-04-22 @ 14:29)  T(F): 98.1 (03-04-22 @ 14:29), Max: 98.5 (03-04-22 @ 10:01)  HR: 69 (03-04-22 @ 14:29) (66 - 76)  BP: 125/67 (03-04-22 @ 14:29) (113/71 - 155/73)  RR:  (17 - 18)  SpO2:  (96% - 100%)  Wt(kg): --  GENERAL: NAD, well-groomed, well-developed  EYES: No proptosis, no lid lag  HEENT:  Atraumatic, Normocephalic  THYROID: Normal size, no palpable nodules  RESPIRATORY: Clear to auscultation bilaterally; No rales, rhonchi, wheezing  CARDIOVASCULAR: Si S2, No murmurs;  GI: Soft, non distended, normal bowel sounds  SKIN: Dry, intact, No rashes or lesions  MUSCULOSKELETAL: Has BL lower extremity edema.  NEURO:  no tremor, sensation decreased in feet BL,    POCT Blood Glucose.: 114 mg/dL (03-04-22 @ 12:48)  POCT Blood Glucose.: 101 mg/dL (03-04-22 @ 07:57)  POCT Blood Glucose.: 76 mg/dL (03-03-22 @ 21:16)  POCT Blood Glucose.: 72 mg/dL (03-03-22 @ 03:14)                            16.9   8.61  )-----------( 187      ( 04 Mar 2022 07:04 )             52.1       03-04    145  |  105  |  20  ----------------------------<  96  3.9   |  19<L>  |  1.21    EGFR if : x   EGFR if non : x     Ca    9.2      03-04  Mg     2.0     03-03  Phos  3.7     03-03    TPro  7.4  /  Alb  4.5  /  TBili  0.9  /  DBili  x   /  AST  23  /  ALT  21  /  AlkPhos  73  03-03      Thyroid Function Tests:  03-03 @ 06:26 TSH 0.94 FreeT4 1.2 T3 -- Anti TPO -- Anti Thyroglobulin Ab -- TSI --  02-21 @ 09:06 TSH 0.76 FreeT4 -- T3 -- Anti TPO -- Anti Thyroglobulin Ab -- TSI --          02-22 Chol 196 Direct LDL -- LDL calculated 111<H> HDL 69 Trig 84, 02-21 Chol 217<H> Direct LDL -- LDL calculated 123<H> HDL 76 Trig 92    Radiology:

## 2022-03-04 NOTE — CONSULT NOTE ADULT - PROBLEM SELECTOR RECOMMENDATION 3
Will continue statin, primary team FU. Suggest to continue medications, monitoring, FU primary team/Urology recommendations.

## 2022-03-04 NOTE — CONSULT NOTE ADULT - PROBLEM SELECTOR RECOMMENDATION 9
Will continue current insulin regimen for now. Will continue monitoring FS, log, and FU.  Patient has low insulin requirement at this time; Will continue monitoring and FU DC recommendations.  Patient counseled for compliance with consistent low carb diet and exercise as tolerated outpatient.
Unclear etiology   check thyroid panel   Orders per MD   check TTE   check blood cultures

## 2022-03-04 NOTE — CONSULT NOTE ADULT - PROBLEM SELECTOR RECOMMENDATION 2
Suggest to continue medications, monitoring, FU primary team/Urology recommendations. No acute issues; Can FU outpatient for thyroid US and further workup.

## 2022-03-04 NOTE — PROGRESS NOTE ADULT - SUBJECTIVE AND OBJECTIVE BOX
afberile  REVIEW OF SYSTEMS:  GEN: no fever,    no chills  RESP: no SOB,   no cough  CVS: no chest pain,   no palpitations  GI: no abdominal pain,   no nausea,   no vomiting,   no constipation,   no diarrhea  : no dysuria,   no frequency  NEURO: no headache,   no dizziness  PSYCH: no depression,   not anxious  Derm : no rash    MEDICATIONS  (STANDING):  aspirin enteric coated 81 milliGRAM(s) Oral daily  atorvastatin 20 milliGRAM(s) Oral at bedtime  dextrose 40% Gel 15 Gram(s) Oral once  dextrose 5%. 1000 milliLiter(s) (50 mL/Hr) IV Continuous <Continuous>  dextrose 5%. 1000 milliLiter(s) (100 mL/Hr) IV Continuous <Continuous>  dextrose 50% Injectable 25 Gram(s) IV Push once  dextrose 50% Injectable 12.5 Gram(s) IV Push once  dextrose 50% Injectable 25 Gram(s) IV Push once  glucagon  Injectable 1 milliGRAM(s) IntraMuscular once  heparin   Injectable 5000 Unit(s) SubCutaneous every 12 hours  insulin lispro (ADMELOG) corrective regimen sliding scale   SubCutaneous three times a day before meals  sodium chloride 0.45% with potassium chloride 20 mEq/L 1000 milliLiter(s) (50 mL/Hr) IV Continuous <Continuous>  tamsulosin 0.4 milliGRAM(s) Oral at bedtime    MEDICATIONS  (PRN):      Vital Signs Last 24 Hrs  T(C): 36.9 (04 Mar 2022 10:01), Max: 36.9 (04 Mar 2022 00:38)  T(F): 98.5 (04 Mar 2022 10:01), Max: 98.5 (04 Mar 2022 10:01)  HR: 73 (04 Mar 2022 10:01) (66 - 76)  BP: 128/72 (04 Mar 2022 10:01) (113/71 - 155/73)  BP(mean): 82 (03 Mar 2022 18:33) (82 - 85)  RR: 18 (04 Mar 2022 10:01) (17 - 18)  SpO2: 97% (04 Mar 2022 10:01) (96% - 100%)  CAPILLARY BLOOD GLUCOSE      POCT Blood Glucose.: 101 mg/dL (04 Mar 2022 07:57)  POCT Blood Glucose.: 76 mg/dL (03 Mar 2022 21:16)    I&O's Summary    03 Mar 2022 07:01  -  04 Mar 2022 07:00  --------------------------------------------------------  IN: 0 mL / OUT: 2850 mL / NET: -2850 mL        PHYSICAL EXAM:  HEAD:  Atraumatic, Normocephalic  NECK: Supple, No   JVD  CHEST/LUNG:   no     rales,     no,    rhonchi  HEART: Regular rate and rhythm;         murmur  ABDOMEN: Soft, Nontender, ;   EXTREMITIES:     no   edema  NEUROLOGY:  alert    LABS:                        16.9   8.61  )-----------( 187      ( 04 Mar 2022 07:04 )             52.1     03-04    145  |  105  |  20  ----------------------------<  96  3.9   |  19<L>  |  1.21    Ca    9.2      04 Mar 2022 07:03  Phos  3.7     03-03  Mg     2.0     03-03    TPro  7.4  /  Alb  4.5  /  TBili  0.9  /  DBili  x   /  AST  23  /  ALT  21  /  AlkPhos  73  03-03          Urinalysis Basic - ( 03 Mar 2022 16:29 )    Color: Light Orange / Appearance: Turbid / S.024 / pH: x  Gluc: x / Ketone: Small  / Bili: Negative / Urobili: Negative   Blood: x / Protein: 100 / Nitrite: Negative   Leuk Esterase: Negative / RBC: 1383 /hpf / WBC 4 /HPF   Sq Epi: x / Non Sq Epi: x / Bacteria: Negative              Thyroid Stimulating Hormone, Serum: 0.94 uIU/mL ( @ 06:26)        Culture - Urine (collected 22 @ 14:43)  Source: Clean Catch Clean Catch (Midstream)  Final Report (22 @ 11:44):    No growth        Consultant(s) Notes Reviewed:      Care Discussed with Consultants/Other Providers:

## 2022-03-04 NOTE — CONSULT NOTE ADULT - ASSESSMENT
Assessment  DMT2: 71y Male with recently dx DM T2 with hyperglycemia, A1C 11.5%, was recently diagnosed and DC on basal insulin + Metformin, now on insulin coverage only, blood sugars trending within acceptable range, no hypoglycemias.  Urinary Retention: on medications, stable, monitored, FU urology.  HLD: On statin.        Nini Carpenter MD  Cell: [phone]  Office: [phone]     Assessment  DMT2: 71y Male with recently dx DM T2 with hyperglycemia, A1C 11.5%, was recently diagnosed and DC on basal insulin + Metformin, now on insulin coverage only, blood sugars trending within acceptable range, no hypoglycemias.  ?Thyroid Nodule: no known history, per imaging "1.2 cm low-attenuation focus in the left thyroid lobe", euthyroid.  Urinary Retention: on medications, stable, monitored, FU urology.  HLD: On statin.        Nini Carpenter MD  Cell: [phone]  Office: [phone]     Assessment  DMT2: 71y Male with recently dx DM T2 with hyperglycemia, A1C 11.5%, was recently diagnosed and DC on basal insulin + Metformin,  now on insulin coverage only, blood sugars trending within acceptable range, no hypoglycemias.  ?Thyroid Nodule: no known history, per imaging "1.2 cm low-attenuation focus in the left thyroid lobe", euthyroid.  Urinary Retention: on medications, stable, monitored, FU urology.  HLD: On statin.        Nini Carpenter MD  Cell: [phone]  Office: [phone]

## 2022-03-04 NOTE — PROGRESS NOTE ADULT - SUBJECTIVE AND OBJECTIVE BOX
CARDIOLOGY     PROGRESS  NOTE   ________________________________________________    CHIEF COMPLAINT:Patient is a 71y old  Male who presents with a chief complaint of urinary retention (03 Mar 2022 18:12)  no complain.  	  REVIEW OF SYSTEMS:  CONSTITUTIONAL: No fever, weight loss, or fatigue  EYES: No eye pain, visual disturbances, or discharge  ENT:  No difficulty hearing, tinnitus, vertigo; No sinus or throat pain  NECK: No pain or stiffness  RESPIRATORY: No cough, wheezing, chills or hemoptysis; No Shortness of Breath  CARDIOVASCULAR: No chest pain, palpitations, passing out, dizziness, or leg swelling  GASTROINTESTINAL: No abdominal or epigastric pain. No nausea, vomiting, or hematemesis; No diarrhea or constipation. No melena or hematochezia.  GENITOURINARY: No dysuria, frequency, hematuria, or incontinence  NEUROLOGICAL: No headaches, memory loss, loss of strength, numbness, or tremors  SKIN: No itching, burning, rashes, or lesions   LYMPH Nodes: No enlarged glands  ENDOCRINE: No heat or cold intolerance; No hair loss  MUSCULOSKELETAL: No joint pain or swelling; No muscle, back, or extremity pain  PSYCHIATRIC: No depression, anxiety, mood swings, or difficulty sleeping  HEME/LYMPH: No easy bruising, or bleeding gums  ALLERGY AND IMMUNOLOGIC: No hives or eczema	    [ ] All others negative	  [ ] Unable to obtain    PHYSICAL EXAM:  T(C): 36.7 (03-04-22 @ 06:06), Max: 36.9 (03-04-22 @ 00:38)  HR: 68 (03-04-22 @ 06:06) (66 - 76)  BP: 123/72 (03-04-22 @ 06:06) (113/71 - 155/73)  RR: 17 (03-04-22 @ 06:06) (17 - 18)  SpO2: 97% (03-04-22 @ 06:06) (96% - 100%)  Wt(kg): --  I&O's Summary    03 Mar 2022 07:01  -  04 Mar 2022 07:00  --------------------------------------------------------  IN: 0 mL / OUT: 2850 mL / NET: -2850 mL        Appearance: Normal	  HEENT:   Normal oral mucosa, PERRL, EOMI	  Lymphatic: No lymphadenopathy  Cardiovascular: Normal S1 S2, No JVD, + murmurs, No edema  Respiratory: Lungs clear to auscultation	  Psychiatry: A & O x 3, Mood & affect appropriate  Gastrointestinal:  Soft, Non-tender, + BS	  Skin: No rashes, No ecchymoses, No cyanosis	  Neurologic: Non-focal  Extremities: Normal range of motion, No clubbing, cyanosis or edema  Vascular: Peripheral pulses palpable 2+ bilaterally    MEDICATIONS  (STANDING):  aspirin enteric coated 81 milliGRAM(s) Oral daily  atorvastatin 20 milliGRAM(s) Oral at bedtime  dextrose 40% Gel 15 Gram(s) Oral once  dextrose 5%. 1000 milliLiter(s) (50 mL/Hr) IV Continuous <Continuous>  dextrose 5%. 1000 milliLiter(s) (100 mL/Hr) IV Continuous <Continuous>  dextrose 50% Injectable 25 Gram(s) IV Push once  dextrose 50% Injectable 12.5 Gram(s) IV Push once  dextrose 50% Injectable 25 Gram(s) IV Push once  glucagon  Injectable 1 milliGRAM(s) IntraMuscular once  heparin   Injectable 5000 Unit(s) SubCutaneous every 12 hours  insulin glargine Injectable (LANTUS) 4 Unit(s) SubCutaneous at bedtime  insulin lispro (ADMELOG) corrective regimen sliding scale   SubCutaneous three times a day before meals  sodium chloride 0.45% with potassium chloride 20 mEq/L 1000 milliLiter(s) (50 mL/Hr) IV Continuous <Continuous>      TELEMETRY: 	    ECG:  	  RADIOLOGY:  OTHER: 	  	  LABS:	 	    CARDIAC MARKERS:                                16.9   8.61  )-----------( 187      ( 04 Mar 2022 07:04 )             52.1     03-04    145  |  105  |  20  ----------------------------<  96  3.9   |  19<L>  |  1.21    Ca    9.2      04 Mar 2022 07:03  Phos  3.7     03-03  Mg     2.0     03-03    TPro  7.4  /  Alb  4.5  /  TBili  0.9  /  DBili  x   /  AST  23  /  ALT  21  /  AlkPhos  73  03-03    proBNP:   Lipid Profile: Cholesterol 196  LDL --  HDL 69  TG 84  Cholesterol 217  LDL --  HDL 76  TG 92    HgA1c:   TSH: Thyroid Stimulating Hormone, Serum: 0.94 uIU/mL (03-03 @ 06:26)  Thyroid Stimulating Hormone, Serum: 0.76 uIU/mL (02-21 @ 09:06)    encourage po fluids and repeat bp to manage for possible post obstructive diuresis   repeat us 24-48 hours to re evaluate hydro  flomax with monitoring of bp and postural changes      Assessment and plan  ---------------------------  71 year-old man with history of hypertension and type 2 diabetes. He presented overnight to the [facility] ER with loss of appetite for the past 2 weeks. He admits to intermittent vomiting, but denies nausea; he admits to feeling cold all the time. Noted on admission to have GINA with a creatinine of 1.4mg/dL.  pan CT was done in the ED and an urinary bladder outlet obstruction with bilateral Daisy was identified.  Ramírez placed in ER with sudden 600cc urine output. He admits to urinary frequency for the past 2 weeks; denies known history of CKD/GINA.   pt was recently admitted sec to uncontrolled dm and is scheduled for stress test this coming Monday.  no chest pain.  asa daily  continue lipitor  urinary retention, urology noted  will re start losartan as renal function improves    check psa  renal function has improved  scheduled for nuclear stress test on Tuesday as out pt.

## 2022-03-04 NOTE — PROGRESS NOTE ADULT - SUBJECTIVE AND OBJECTIVE BOX
Overnight events noted      VITAL:  T(C): , Max: 37.1 (22 @ 16:24)  T(F): , Max: 98.8 (22 @ 16:24)  HR: 73 (22 @ 16:24)  BP: 139/80 (22 @ 16:24)  BP(mean): 82 (22 @ 18:33)  RR: 18 (22 @ 16:24)  SpO2: 98% (22 @ 16:24)  urine output 2850cc/24h      PHYSICAL EXAM:  Constitutional: NAD, Alert  HEENT: NCAT, MMM  Neck: Supple, No JVD  Respiratory: CTA-b/l  Cardiovascular: RRR s1s2, no m/r/g  Gastrointestinal: BS+, soft, NT/ND  : (+)berry  Extremities: No peripheral edema b/l  Neurological: no focal deficits; strength grossly intact  Back: no CVAT b/l  Skin: No rashes, no nevi    LABS:                        16.9   8.61  )-----------( 187      ( 04 Mar 2022 07:04 )             52.1     Na(145)/K(3.9)/Cl(105)/HCO3(19)/BUN(20)/Cr(1.21)Glu(96)/Ca(9.2)/Mg(--)/PO4(--)     @ 07:03  Na(144)/K(3.7)/Cl(103)/HCO3(25)/BUN(27)/Cr(1.40)Glu(74)/Ca(9.9)/Mg(2.0)/PO4(3.7)     @ 04:07    Urinalysis Basic - ( 03 Mar 2022 16:29 )  Color: Light Orange / Appearance: Turbid / S.024 / pH: x  Gluc: x / Ketone: Small  / Bili: Negative / Urobili: Negative   Blood: x / Protein: 100 / Nitrite: Negative   Leuk Esterase: Negative / RBC: 1383 /hpf / WBC 4 /HPF   Sq Epi: x / Non Sq Epi: x / Bacteria: Negative  Creatinine, Random Urine: 57 mg/dL ( @ 16:29)  Protein/Creatinine Ratio Calculation: 1.6 Ratio ( @ 16:29)      IMPRESSION: 71M w/ HTN and DM2; 3/3/22 p/w obstructive uropathy    (1)CKD - 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 if urine output <2.5L/24h  (2)Timing of TOV per primary team  (3)Dose new meds for GFR 50-60ml/min (present dosing is acceptable)    Harjit Sheldon MD  [facility]  Office: [phone]  Cell: [phone]       no pain, no sob      VITAL:  T(C): , Max: 37.1 (22 @ 16:24)  T(F): , Max: 98.8 (22 @ 16:24)  HR: 73 (22 @ 16:24)  BP: 139/80 (22 @ 16:24)  BP(mean): 82 (22 @ 18:33)  RR: 18 (22 @ 16:24)  SpO2: 98% (22 @ 16:24)  urine output 2850cc/24h      PHYSICAL EXAM:  Constitutional: NAD, Alert  HEENT: NCAT, MMM  Neck: Supple, No JVD  Respiratory: CTA-b/l  Cardiovascular: RRR s1s2, no m/r/g  Gastrointestinal: BS+, soft, NT/ND  : (+)berry  Extremities: No peripheral edema b/l  Neurological: no focal deficits; strength grossly intact  Back: no CVAT b/l  Skin: No rashes, no nevi    LABS:                        16.9   8.61  )-----------( 187      ( 04 Mar 2022 07:04 )             52.1     Na(145)/K(3.9)/Cl(105)/HCO3(19)/BUN(20)/Cr(1.21)Glu(96)/Ca(9.2)/Mg(--)/PO4(--)    04 @ 07:03  Na(144)/K(3.7)/Cl(103)/HCO3(25)/BUN(27)/Cr(1.40)Glu(74)/Ca(9.9)/Mg(2.0)/PO4(3.7)     @ 04:07    Urinalysis Basic - ( 03 Mar 2022 16:29 )  Color: Light Orange / Appearance: Turbid / S.024 / pH: x  Gluc: x / Ketone: Small  / Bili: Negative / Urobili: Negative   Blood: x / Protein: 100 / Nitrite: Negative   Leuk Esterase: Negative / RBC: 1383 /hpf / WBC 4 /HPF   Sq Epi: x / Non Sq Epi: x / Bacteria: Negative  Creatinine, Random Urine: 57 mg/dL ( @ 16:29)  Protein/Creatinine Ratio Calculation: 1.6 Ratio ( @ 16:29)      IMPRESSION: 71M w/ HTN and DM2; 3/3/22 p/w obstructive uropathy    (1)CKD - 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 if urine output <2.5L/24h  (2)Timing of TOV per primary team  (3)Dose new meds for GFR 50-60ml/min (present dosing is acceptable)    Harjit Sheldon MD  [facility]  Office: [phone]  Cell: [phone]

## 2022-03-05 LAB
ANION GAP SERPL CALC-SCNC: 17 MMOL/L — SIGNIFICANT CHANGE UP (ref 5–17)
BUN SERPL-MCNC: 18 MG/DL — SIGNIFICANT CHANGE UP (ref 7–23)
CALCIUM SERPL-MCNC: 8.9 MG/DL — SIGNIFICANT CHANGE UP (ref 8.4–10.5)
CHLORIDE SERPL-SCNC: 103 MMOL/L — SIGNIFICANT CHANGE UP (ref 96–108)
CO2 SERPL-SCNC: 22 MMOL/L — SIGNIFICANT CHANGE UP (ref 22–31)
CREAT SERPL-MCNC: 1.04 MG/DL — SIGNIFICANT CHANGE UP (ref 0.5–1.3)
EGFR: 76 ML/MIN/1.73M2 — SIGNIFICANT CHANGE UP
GLUCOSE BLDC GLUCOMTR-MCNC: 137 MG/DL — HIGH (ref 70–99)
GLUCOSE BLDC GLUCOMTR-MCNC: 141 MG/DL — HIGH (ref 70–99)
GLUCOSE BLDC GLUCOMTR-MCNC: 149 MG/DL — HIGH (ref 70–99)
GLUCOSE BLDC GLUCOMTR-MCNC: 164 MG/DL — HIGH (ref 70–99)
GLUCOSE SERPL-MCNC: 173 MG/DL — HIGH (ref 70–99)
POTASSIUM SERPL-MCNC: 4.1 MMOL/L — SIGNIFICANT CHANGE UP (ref 3.5–5.3)
POTASSIUM SERPL-SCNC: 4.1 MMOL/L — SIGNIFICANT CHANGE UP (ref 3.5–5.3)
SODIUM SERPL-SCNC: 142 MMOL/L — SIGNIFICANT CHANGE UP (ref 135–145)

## 2022-03-05 RX ADMIN — ATORVASTATIN CALCIUM 20 MILLIGRAM(S): 80 TABLET, FILM COATED ORAL at 22:12

## 2022-03-05 RX ADMIN — DEXTROSE MONOHYDRATE, SODIUM CHLORIDE, AND POTASSIUM CHLORIDE 50 MILLILITER(S): 50; .745; 4.5 INJECTION, SOLUTION INTRAVENOUS at 05:25

## 2022-03-05 RX ADMIN — Medication 81 MILLIGRAM(S): at 12:23

## 2022-03-05 RX ADMIN — Medication 1: at 08:49

## 2022-03-05 RX ADMIN — TAMSULOSIN HYDROCHLORIDE 0.4 MILLIGRAM(S): 0.4 CAPSULE ORAL at 22:12

## 2022-03-05 RX ADMIN — HEPARIN SODIUM 5000 UNIT(S): 5000 INJECTION INTRAVENOUS; SUBCUTANEOUS at 17:03

## 2022-03-05 NOTE — PROGRESS NOTE ADULT - SUBJECTIVE AND OBJECTIVE BOX
CARDIOLOGY     PROGRESS  NOTE   ________________________________________________    CHIEF COMPLAINT:Patient is a 71y old  Male who presents with a chief complaint of urinary retention (03 Mar 2022 18:12)  no complain.  	  REVIEW OF SYSTEMS:  CONSTITUTIONAL: No fever, weight loss, or fatigue  EYES: No eye pain, visual disturbances, or discharge  ENT:  No difficulty hearing, tinnitus, vertigo; No sinus or throat pain  NECK: No pain or stiffness  RESPIRATORY: No cough, wheezing, chills or hemoptysis; No Shortness of Breath  CARDIOVASCULAR: No chest pain, palpitations, passing out, dizziness, or leg swelling  GASTROINTESTINAL: No abdominal or epigastric pain. No nausea, vomiting, or hematemesis; No diarrhea or constipation. No melena or hematochezia.  GENITOURINARY: No dysuria, frequency, hematuria, or incontinence  NEUROLOGICAL: No headaches, memory loss, loss of strength, numbness, or tremors  SKIN: No itching, burning, rashes, or lesions   LYMPH Nodes: No enlarged glands  ENDOCRINE: No heat or cold intolerance; No hair loss  MUSCULOSKELETAL: No joint pain or swelling; No muscle, back, or extremity pain  PSYCHIATRIC: No depression, anxiety, mood swings, or difficulty sleeping  HEME/LYMPH: No easy bruising, or bleeding gums  ALLERGY AND IMMUNOLOGIC: No hives or eczema	    [ ] All others negative	  [ ] Unable to obtain    PHYSICAL EXAM:  T(C): 36.5 (03-05-22 @ 05:23), Max: 37.1 (03-04-22 @ 16:24)  HR: 82 (03-05-22 @ 05:23) (69 - 82)  BP: 109/72 (03-05-22 @ 05:23) (109/72 - 139/80)  RR: 18 (03-05-22 @ 05:23) (18 - 18)  SpO2: 99% (03-05-22 @ 05:23) (97% - 99%)  Wt(kg): --  I&O's Summary    04 Mar 2022 07:01  -  05 Mar 2022 07:00  --------------------------------------------------------  IN: 0 mL / OUT: 2200 mL / NET: -2200 mL        Appearance: Normal	  HEENT:   Normal oral mucosa, PERRL, EOMI	  Lymphatic: No lymphadenopathy  Cardiovascular: Normal S1 S2, No JVD, + murmurs, No edema  Respiratory: Lungs clear to auscultation	  Psychiatry: A & O x 3, Mood & affect appropriate  Gastrointestinal:  Soft, Non-tender, + BS	  Skin: No rashes, No ecchymoses, No cyanosis	  Neurologic: Non-focal  Extremities: Normal range of motion, No clubbing, cyanosis or edema  Vascular: Peripheral pulses palpable 2+ bilaterally    MEDICATIONS  (STANDING):  aspirin enteric coated 81 milliGRAM(s) Oral daily  atorvastatin 20 milliGRAM(s) Oral at bedtime  dextrose 40% Gel 15 Gram(s) Oral once  dextrose 5%. 1000 milliLiter(s) (50 mL/Hr) IV Continuous <Continuous>  dextrose 5%. 1000 milliLiter(s) (100 mL/Hr) IV Continuous <Continuous>  dextrose 50% Injectable 25 Gram(s) IV Push once  dextrose 50% Injectable 12.5 Gram(s) IV Push once  dextrose 50% Injectable 25 Gram(s) IV Push once  glucagon  Injectable 1 milliGRAM(s) IntraMuscular once  heparin   Injectable 5000 Unit(s) SubCutaneous every 12 hours  insulin lispro (ADMELOG) corrective regimen sliding scale   SubCutaneous three times a day before meals  sodium chloride 0.45% with potassium chloride 20 mEq/L 1000 milliLiter(s) (50 mL/Hr) IV Continuous <Continuous>  tamsulosin 0.4 milliGRAM(s) Oral at bedtime      TELEMETRY: 	    ECG:  	  RADIOLOGY:  OTHER: 	  	  LABS:	 	    CARDIAC MARKERS:                                16.9   8.61  )-----------( 187      ( 04 Mar 2022 07:04 )             52.1     03-04    145  |  105  |  20  ----------------------------<  96  3.9   |  19<L>  |  1.21    Ca    9.2      04 Mar 2022 07:03      proBNP:   Lipid Profile: Cholesterol 196  LDL --  HDL 69  TG 84  Cholesterol 217  LDL --  HDL 76  TG 92    HgA1c:   TSH: Thyroid Stimulating Hormone, Serum: 0.94 uIU/mL (03-03 @ 06:26)  Thyroid Stimulating Hormone, Serum: 0.76 uIU/mL (02-21 @ 09:06)    encourage po fluids and repeat bp to manage for possible post obstructive diuresis   repeat us 24-48 hours to re evaluate hydro  flomax with monitoring of bp and postural changes      Assessment and plan  ---------------------------  71 year-old man with history of hypertension and type 2 diabetes. He presented overnight to the [facility] ER with loss of appetite for the past 2 weeks. He admits to intermittent vomiting, but denies nausea; he admits to feeling cold all the time. Noted on admission to have GINA with a creatinine of 1.4mg/dL.  pan CT was done in the ED and an urinary bladder outlet obstruction with bilateral [place] was identified.  Ramírez placed in ER with sudden 600cc urine output. He admits to urinary frequency for the past 2 weeks; denies known history of CKD/GINA.   pt was recently admitted sec to uncontrolled dm and is scheduled for stress test this coming Monday.  no chest pain.  asa daily  continue lipitor  urinary retention, urology noted  will re start losartan as renal function improves    check psa  renal function has improved  scheduled for nuclear stress test on Tuesday as out pt.

## 2022-03-05 NOTE — PROGRESS NOTE ADULT - SUBJECTIVE AND OBJECTIVE BOX
afebriel  REVIEW OF SYSTEMS:  GEN: no fever,    no chills  RESP: no SOB,   no cough  CVS: no chest pain,   no palpitations  GI: no abdominal pain,   no nausea,   no vomiting,   no constipation,   no diarrhea  : no dysuria,   no frequency  NEURO: no headache,   no dizziness  PSYCH: no depression,   not anxious  Derm : no rash    MEDICATIONS  (STANDING):  aspirin enteric coated 81 milliGRAM(s) Oral daily  atorvastatin 20 milliGRAM(s) Oral at bedtime  dextrose 40% Gel 15 Gram(s) Oral once  dextrose 5%. 1000 milliLiter(s) (50 mL/Hr) IV Continuous <Continuous>  dextrose 5%. 1000 milliLiter(s) (100 mL/Hr) IV Continuous <Continuous>  dextrose 50% Injectable 25 Gram(s) IV Push once  dextrose 50% Injectable 12.5 Gram(s) IV Push once  dextrose 50% Injectable 25 Gram(s) IV Push once  glucagon  Injectable 1 milliGRAM(s) IntraMuscular once  heparin   Injectable 5000 Unit(s) SubCutaneous every 12 hours  insulin lispro (ADMELOG) corrective regimen sliding scale   SubCutaneous three times a day before meals  sodium chloride 0.45% with potassium chloride 20 mEq/L 1000 milliLiter(s) (50 mL/Hr) IV Continuous <Continuous>  tamsulosin 0.4 milliGRAM(s) Oral at bedtime    MEDICATIONS  (PRN):      Vital Signs Last 24 Hrs  T(C): 36.5 (05 Mar 2022 05:23), Max: 37.1 (04 Mar 2022 16:24)  T(F): 97.7 (05 Mar 2022 05:23), Max: 98.8 (04 Mar 2022 16:24)  HR: 82 (05 Mar 2022 05:23) (69 - 82)  BP: 109/72 (05 Mar 2022 05:23) (109/72 - 139/80)  BP(mean): --  RR: 18 (05 Mar 2022 05:23) (18 - 18)  SpO2: 99% (05 Mar 2022 05:23) (97% - 99%)  CAPILLARY BLOOD GLUCOSE      POCT Blood Glucose.: 164 mg/dL (05 Mar 2022 08:23)  POCT Blood Glucose.: 149 mg/dL (04 Mar 2022 21:53)  POCT Blood Glucose.: 112 mg/dL (04 Mar 2022 17:09)  POCT Blood Glucose.: 114 mg/dL (04 Mar 2022 12:48)    I&O's Summary    04 Mar 2022 07:01  -  05 Mar 2022 07:00  --------------------------------------------------------  IN: 0 mL / OUT: 2200 mL / NET: -2200 mL        PHYSICAL EXAM:  HEAD:  Atraumatic, Normocephalic  NECK: Supple, No   JVD  CHEST/LUNG:   no     rales,     no,    rhonchi  HEART: Regular rate and rhythm;         murmur  ABDOMEN: Soft, Nontender, ;   EXTREMITIES:   no     edema  NEUROLOGY:  alert    LABS:                        16.9   8.61  )-----------( 187      ( 04 Mar 2022 07:04 )             52.1     03-    145  |  105  |  20  ----------------------------<  96  3.9   |  19<L>  |  1.21    Ca    9.2      04 Mar 2022 07:03            Urinalysis Basic - ( 03 Mar 2022 16:29 )    Color: Light Orange / Appearance: Turbid / S.024 / pH: x  Gluc: x / Ketone: Small  / Bili: Negative / Urobili: Negative   Blood: x / Protein: 100 / Nitrite: Negative   Leuk Esterase: Negative / RBC: 1383 /hpf / WBC 4 /HPF   Sq Epi: x / Non Sq Epi: x / Bacteria: Negative              Thyroid Stimulating Hormone, Serum: 0.94 uIU/mL ( @ 06:26)          Consultant(s) Notes Reviewed:      Care Discussed with Consultants/Other Providers:

## 2022-03-05 NOTE — PROGRESS NOTE ADULT - SUBJECTIVE AND OBJECTIVE BOX
Chief complaint    Patient is a 71y old  Male who presents with a chief complaint of urinary retention (03 Mar 2022 18:12)   Review of systems  Patient in bed, appears comfortable.    Labs and Fingersticks  CAPILLARY BLOOD GLUCOSE      POCT Blood Glucose.: 141 mg/dL (05 Mar 2022 12:41)  POCT Blood Glucose.: 164 mg/dL (05 Mar 2022 08:23)  POCT Blood Glucose.: 149 mg/dL (04 Mar 2022 21:53)  POCT Blood Glucose.: 112 mg/dL (04 Mar 2022 17:09)      Anion Gap, Serum: 17 (03-05 @ 10:18)  Anion Gap, Serum: 21 *H* (03-04 @ 07:03)      Calcium, Total Serum: 8.9 (03-05 @ 10:18)  Calcium, Total Serum: 9.2 (03-04 @ 07:03)          03-05    142  |  103  |  18  ----------------------------<  173<H>  4.1   |  22  |  1.04    Ca    8.9      05 Mar 2022 10:18                          16.9   8.61  )-----------( 187      ( 04 Mar 2022 07:04 )             52.1     Medications  MEDICATIONS  (STANDING):  aspirin enteric coated 81 milliGRAM(s) Oral daily  atorvastatin 20 milliGRAM(s) Oral at bedtime  dextrose 40% Gel 15 Gram(s) Oral once  dextrose 5%. 1000 milliLiter(s) (50 mL/Hr) IV Continuous <Continuous>  dextrose 5%. 1000 milliLiter(s) (100 mL/Hr) IV Continuous <Continuous>  dextrose 50% Injectable 25 Gram(s) IV Push once  dextrose 50% Injectable 12.5 Gram(s) IV Push once  dextrose 50% Injectable 25 Gram(s) IV Push once  glucagon  Injectable 1 milliGRAM(s) IntraMuscular once  heparin   Injectable 5000 Unit(s) SubCutaneous every 12 hours  insulin lispro (ADMELOG) corrective regimen sliding scale   SubCutaneous three times a day before meals  tamsulosin 0.4 milliGRAM(s) Oral at bedtime      Physical Exam  General: Patient comfortable in bed  Vital Signs Last 12 Hrs  T(F): 97.9 (03-05-22 @ 12:20), Max: 97.9 (03-05-22 @ 12:20)  HR: 70 (03-05-22 @ 12:20) (70 - 82)  BP: 126/77 (03-05-22 @ 12:20) (109/72 - 126/77)  BP(mean): --  RR: 18 (03-05-22 @ 12:20) (18 - 18)  SpO2: 99% (03-05-22 @ 12:20) (99% - 99%)  Neck: No palpable thyroid nodules.  CVS: S1S2, No murmurs  Respiratory: No wheezing, no crepitations  GI: Abdomen soft, bowel sounds positive  Musculoskeletal:  edema lower extremities.     Diagnostics

## 2022-03-05 NOTE — PROGRESS NOTE ADULT - ASSESSMENT
70 yo M   recnetly dx  dm.  was   seen by  sher,  now returns   No abdominal pain, no fever, no chest pain, no sob no dysuria.  COVID vaccinated no sick contacts.      pt  was   d/.c  by  myself on 2/24/22,  and  was  seen by  danya guevara  and  sher littlejohn,  who  will be  f/p  on pt          c/o   with loss of appetite for the past 2 weeks, in associated with impaired small/impaired taste.   He admits to intermittent vomiting, but denies nausea;    Noted on admission to have GINA with a creatinine of 1.4    CT I+ in the ER to have bilateral hydro to level of bladder, consistent with bladder outlet obstruction.    Ramírez placed in ER with sudden 600cc urine output.  seen by urology  on  flomax  DM,  follow  fs   on asa, lipitor   rx plan per sher littlejohn. /  given his  fs,  lantus  was  stopped   on iv fluids on  dvt ppx  tov   is plan in few  days

## 2022-03-05 NOTE — PROGRESS NOTE ADULT - ASSESSMENT
Assessment  DMT2: 71y Male with recently dx DM T2 with hyperglycemia, A1C 11.5%, was recently diagnosed and DC on basal insulin + Metformin,  readmitted, now on insulin coverage only, blood sugars improved, not eating full meals,  no hypoglycemias.  ?Thyroid Nodule: no known history, per imaging "1.2 cm low-attenuation focus in the left thyroid lobe", euthyroid.  Urinary Retention: on medications, stable, monitored, FU urology.  HLD: On statin.        Nini Carpenter MD  Cell: [phone]  Office: [phone]

## 2022-03-06 LAB
GLUCOSE BLDC GLUCOMTR-MCNC: 136 MG/DL — HIGH (ref 70–99)
GLUCOSE BLDC GLUCOMTR-MCNC: 147 MG/DL — HIGH (ref 70–99)
GLUCOSE BLDC GLUCOMTR-MCNC: 177 MG/DL — HIGH (ref 70–99)
GLUCOSE BLDC GLUCOMTR-MCNC: 177 MG/DL — HIGH (ref 70–99)

## 2022-03-06 PROCEDURE — 76775 US EXAM ABDO BACK WALL LIM: CPT | Mod: 26

## 2022-03-06 RX ORDER — PANTOPRAZOLE SODIUM 20 MG/1
40 TABLET, DELAYED RELEASE ORAL
Refills: 0 | Status: DISCONTINUED | OUTPATIENT
Start: 2022-03-06 | End: 2022-03-07

## 2022-03-06 RX ORDER — INSULIN GLARGINE 100 [IU]/ML
8 INJECTION, SOLUTION SUBCUTANEOUS AT BEDTIME
Refills: 0 | Status: DISCONTINUED | OUTPATIENT
Start: 2022-03-06 | End: 2022-03-07

## 2022-03-06 RX ORDER — DRONABINOL 2.5 MG
2.5 CAPSULE ORAL
Refills: 0 | Status: DISCONTINUED | OUTPATIENT
Start: 2022-03-06 | End: 2022-03-07

## 2022-03-06 RX ADMIN — HEPARIN SODIUM 5000 UNIT(S): 5000 INJECTION INTRAVENOUS; SUBCUTANEOUS at 05:39

## 2022-03-06 RX ADMIN — Medication 1: at 12:39

## 2022-03-06 RX ADMIN — TAMSULOSIN HYDROCHLORIDE 0.4 MILLIGRAM(S): 0.4 CAPSULE ORAL at 22:48

## 2022-03-06 RX ADMIN — Medication 2.5 MILLIGRAM(S): at 17:16

## 2022-03-06 RX ADMIN — Medication 81 MILLIGRAM(S): at 12:07

## 2022-03-06 RX ADMIN — HEPARIN SODIUM 5000 UNIT(S): 5000 INJECTION INTRAVENOUS; SUBCUTANEOUS at 17:16

## 2022-03-06 RX ADMIN — Medication 1: at 08:46

## 2022-03-06 RX ADMIN — INSULIN GLARGINE 8 UNIT(S): 100 INJECTION, SOLUTION SUBCUTANEOUS at 22:48

## 2022-03-06 RX ADMIN — ATORVASTATIN CALCIUM 20 MILLIGRAM(S): 80 TABLET, FILM COATED ORAL at 22:48

## 2022-03-06 NOTE — PROGRESS NOTE ADULT - ASSESSMENT
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

## 2022-03-06 NOTE — PROGRESS NOTE ADULT - SUBJECTIVE AND OBJECTIVE BOX
Chief complaint  Patient is a 71y old  Male who presents with a chief complaint of urinary retention (03 Mar 2022 18:12)   Review of systems  Patient awake in chair, looks comfortable, no hypoglycemic episodes.    Labs and Fingersticks  CAPILLARY BLOOD GLUCOSE      POCT Blood Glucose.: 177 mg/dL (06 Mar 2022 12:18)  POCT Blood Glucose.: 177 mg/dL (06 Mar 2022 08:18)  POCT Blood Glucose.: 137 mg/dL (05 Mar 2022 21:37)  POCT Blood Glucose.: 149 mg/dL (05 Mar 2022 17:26)  POCT Blood Glucose.: 141 mg/dL (05 Mar 2022 12:41)      Anion Gap, Serum: 17 (03-05 @ 10:18)      Calcium, Total Serum: 8.9 (03-05 @ 10:18)          03-05    142  |  103  |  18  ----------------------------<  173<H>  4.1   |  22  |  1.04    Ca    8.9      05 Mar 2022 10:18      Medications  MEDICATIONS  (STANDING):  aspirin enteric coated 81 milliGRAM(s) Oral daily  atorvastatin 20 milliGRAM(s) Oral at bedtime  dextrose 40% Gel 15 Gram(s) Oral once  dextrose 5%. 1000 milliLiter(s) (50 mL/Hr) IV Continuous <Continuous>  dextrose 5%. 1000 milliLiter(s) (100 mL/Hr) IV Continuous <Continuous>  dextrose 50% Injectable 25 Gram(s) IV Push once  dextrose 50% Injectable 12.5 Gram(s) IV Push once  dextrose 50% Injectable 25 Gram(s) IV Push once  glucagon  Injectable 1 milliGRAM(s) IntraMuscular once  heparin   Injectable 5000 Unit(s) SubCutaneous every 12 hours  insulin glargine Injectable (LANTUS) 8 Unit(s) SubCutaneous at bedtime  insulin lispro (ADMELOG) corrective regimen sliding scale   SubCutaneous three times a day before meals  tamsulosin 0.4 milliGRAM(s) Oral at bedtime      Physical Exam  General: Patient comfortable in bed  Vital Signs Last 12 Hrs  T(F): 97.9 (03-06-22 @ 11:48), Max: 98 (03-06-22 @ 04:56)  HR: 82 (03-06-22 @ 11:48) (76 - 82)  BP: 111/70 (03-06-22 @ 11:48) (111/70 - 128/83)  BP(mean): --  RR: 18 (03-06-22 @ 11:48) (17 - 18)  SpO2: 97% (03-06-22 @ 11:48) (97% - 97%)  Neck: No palpable thyroid nodules.  CVS: S1S2, No murmurs  Respiratory: No wheezing, no crepitations  GI: Abdomen soft, bowel sounds positive  Musculoskeletal:  edema lower extremities.   Skin: No skin rashes, no ecchymosis    Diagnostics             Chief complaint  Patient is a 71y old  Male who presents with a chief complaint of urinary retention (03 Mar 2022 18:12)   Review of systems  Patient awake in chair, looks comfortable, no hypoglycemic episodes.    Labs and Fingersticks  CAPILLARY BLOOD GLUCOSE      POCT Blood Glucose.: 177 mg/dL (06 Mar 2022 12:18)  POCT Blood Glucose.: 177 mg/dL (06 Mar 2022 08:18)  POCT Blood Glucose.: 137 mg/dL (05 Mar 2022 21:37)  POCT Blood Glucose.: 149 mg/dL (05 Mar 2022 17:26)  POCT Blood Glucose.: 141 mg/dL (05 Mar 2022 12:41)      Anion Gap, Serum: 17 (03-05 @ 10:18)      Calcium, Total Serum: 8.9 (03-05 @ 10:18)          03-05    142  |  103  |  18  ----------------------------<  173<H>  4.1   |  22  |  1.04    Ca    8.9      05 Mar 2022 10:18      Medications  MEDICATIONS  (STANDING):  aspirin enteric coated 81 milliGRAM(s) Oral daily  atorvastatin 20 milliGRAM(s) Oral at bedtime  dextrose 40% Gel 15 Gram(s) Oral once  dextrose 5%. 1000 milliLiter(s) (50 mL/Hr) IV Continuous <Continuous>  dextrose 5%. 1000 milliLiter(s) (100 mL/Hr) IV Continuous <Continuous>  dextrose 50% Injectable 25 Gram(s) IV Push once  dextrose 50% Injectable 12.5 Gram(s) IV Push once  dextrose 50% Injectable 25 Gram(s) IV Push once  glucagon  Injectable 1 milliGRAM(s) IntraMuscular once  heparin   Injectable 5000 Unit(s) SubCutaneous every 12 hours  insulin glargine Injectable (LANTUS) 8 Unit(s) SubCutaneous at bedtime  insulin lispro (ADMELOG) corrective regimen sliding scale   SubCutaneous three times a day before meals  tamsulosin 0.4 milliGRAM(s) Oral at bedtime      Physical Exam  General: Patient comfortable in bed  Vital Signs Last 12 Hrs  T(F): 97.9 (03-06-22 @ 11:48), Max: 98 (03-06-22 @ 04:56)  HR: 82 (03-06-22 @ 11:48) (76 - 82)  BP: 111/70 (03-06-22 @ 11:48) (111/70 - 128/83)  BP(mean): --  RR: 18 (03-06-22 @ 11:48) (17 - 18)  SpO2: 97% (03-06-22 @ 11:48) (97% - 97%)  Neck: No palpable thyroid nodules.  CVS: S1S2, No murmurs  Respiratory: No wheezing, no crepitations  GI: Abdomen soft, bowel sounds positive  Musculoskeletal:  edema lower extremities.   Skin: No skin rashes, no ecchymosis    Diagnostics

## 2022-03-06 NOTE — CONSULT NOTE ADULT - SUBJECTIVE AND OBJECTIVE BOX
[facility]  Dash Thomason PA-C  [facility] [place] [address]  [phone]      Chief Complaint:  Patient is a 71y old  Male who presents with a chief complaint of urinary retention (03 Mar 2022 18:12)      HPI: 71 year-old man with history of hypertension and type 2 diabetes. He presented overnight to the [facility] ER with loss of appetite for the past 2 weeks. He admits to intermittent vomiting, but denies nausea; he admits to feeling cold all the time. Noted on admission to have GINA with a creatinine of 1.4mg/dL.  pan CT was done in the ED and an urinary bladder outlet obstruction with bilateral Hydro was identified.    Allergies:  No Known Allergies      Medications:  aspirin enteric coated 81 milliGRAM(s) Oral daily  atorvastatin 20 milliGRAM(s) Oral at bedtime  dextrose 40% Gel 15 Gram(s) Oral once  dextrose 5%. 1000 milliLiter(s) IV Continuous <Continuous>  dextrose 5%. 1000 milliLiter(s) IV Continuous <Continuous>  dextrose 50% Injectable 25 Gram(s) IV Push once  dextrose 50% Injectable 12.5 Gram(s) IV Push once  dextrose 50% Injectable 25 Gram(s) IV Push once  glucagon  Injectable 1 milliGRAM(s) IntraMuscular once  heparin   Injectable 5000 Unit(s) SubCutaneous every 12 hours  insulin glargine Injectable (LANTUS) 8 Unit(s) SubCutaneous at bedtime  insulin lispro (ADMELOG) corrective regimen sliding scale   SubCutaneous three times a day before meals  tamsulosin 0.4 milliGRAM(s) Oral at bedtime      PMHX/PSHX:      Family history:      Social History:     ROS:     General:  No wt loss, fevers, chills, night sweats, fatigue,   Eyes:  Good vision, no reported pain  ENT:  No sore throat, pain, runny nose, dysphagia  CV:  No pain, palpitations, hypo/hypertension  Resp:  No dyspnea, cough, tachypnea, wheezing  GI:  No pain, No nausea, No vomiting, No diarrhea, No constipation, No weight loss, No fever, No pruritis, No rectal bleeding, No tarry stools, No dysphagia,  :  No pain, bleeding, incontinence, nocturia  Muscle:  No pain, weakness  Neuro:  No weakness, tingling, memory problems  Psych:  No fatigue, insomnia, mood problems, depression  Endocrine:  No polyuria, polydipsia, cold/heat intolerance  Heme:  No petechiae, ecchymosis, easy bruisability  Skin:  No rash, tattoos, scars, edema      PHYSICAL EXAM:   Vital Signs:  Vital Signs Last 24 Hrs  T(C): 36.6 (06 Mar 2022 11:48), Max: 36.7 (06 Mar 2022 04:56)  T(F): 97.9 (06 Mar 2022 11:48), Max: 98 (06 Mar 2022 04:56)  HR: 82 (06 Mar 2022 11:48) (72 - 82)  BP: 111/70 (06 Mar 2022 11:48) (111/70 - 130/79)  BP(mean): --  RR: 18 (06 Mar 2022 11:48) (17 - 18)  SpO2: 97% (06 Mar 2022 11:48) (97% - 98%)  Daily     Daily     GENERAL:  Appears stated age,   HEENT:  NC/AT,    CHEST:  Full & symmetric excursion,   HEART:  Regular rhythm  ABDOMEN:  Soft, non-tender, non-distended,   EXTEREMITIES:  no cyanosis,clubbing or edema  SKIN:  No rash  NEURO:  Alert,    LABS:    03-05    142  |  103  |  18  ----------------------------<  173<H>  4.1   |  22  |  1.04    Ca    8.9      05 Mar 2022 10:18                Imaging:

## 2022-03-06 NOTE — PROGRESS NOTE ADULT - SUBJECTIVE AND OBJECTIVE BOX
afberile    REVIEW OF SYSTEMS:  GEN: no fever,    no chills  RESP: no SOB,   no cough  CVS: no chest pain,   no palpitations  GI: no abdominal pain,   no nausea,   no vomiting,   no constipation,   no diarrhea  : no dysuria,   no frequency  NEURO: no headache,   no dizziness  PSYCH: no depression,   not anxious  Derm : no rash    MEDICATIONS  (STANDING):  aspirin enteric coated 81 milliGRAM(s) Oral daily  atorvastatin 20 milliGRAM(s) Oral at bedtime  dextrose 40% Gel 15 Gram(s) Oral once  dextrose 5%. 1000 milliLiter(s) (50 mL/Hr) IV Continuous <Continuous>  dextrose 5%. 1000 milliLiter(s) (100 mL/Hr) IV Continuous <Continuous>  dextrose 50% Injectable 25 Gram(s) IV Push once  dextrose 50% Injectable 12.5 Gram(s) IV Push once  dextrose 50% Injectable 25 Gram(s) IV Push once  glucagon  Injectable 1 milliGRAM(s) IntraMuscular once  heparin   Injectable 5000 Unit(s) SubCutaneous every 12 hours  insulin lispro (ADMELOG) corrective regimen sliding scale   SubCutaneous three times a day before meals  tamsulosin 0.4 milliGRAM(s) Oral at bedtime    MEDICATIONS  (PRN):      Vital Signs Last 24 Hrs  T(C): 36.7 (06 Mar 2022 04:56), Max: 36.7 (06 Mar 2022 04:56)  T(F): 98 (06 Mar 2022 04:56), Max: 98 (06 Mar 2022 04:56)  HR: 76 (06 Mar 2022 04:56) (70 - 76)  BP: 128/83 (06 Mar 2022 04:56) (126/77 - 130/79)  BP(mean): --  RR: 17 (06 Mar 2022 04:56) (17 - 18)  SpO2: 97% (06 Mar 2022 04:56) (97% - 99%)  CAPILLARY BLOOD GLUCOSE      POCT Blood Glucose.: 177 mg/dL (06 Mar 2022 08:18)  POCT Blood Glucose.: 137 mg/dL (05 Mar 2022 21:37)  POCT Blood Glucose.: 149 mg/dL (05 Mar 2022 17:26)  POCT Blood Glucose.: 141 mg/dL (05 Mar 2022 12:41)    I&O's Summary    05 Mar 2022 07:01  -  06 Mar 2022 07:00  --------------------------------------------------------  IN: 1270 mL / OUT: 2000 mL / NET: -730 mL        PHYSICAL EXAM:  HEAD:  Atraumatic, Normocephalic  NECK: Supple, No   JVD  CHEST/LUNG:   no     rales,     no,    rhonchi  HEART: Regular rate and rhythm;         murmur  ABDOMEN: Soft, Nontender, ;   EXTREMITIES:   no     edema  NEUROLOGY:  alert    LABS:    03-05    142  |  103  |  18  ----------------------------<  173<H>  4.1   |  22  |  1.04    Ca    8.9      05 Mar 2022 10:18                      Thyroid Stimulating Hormone, Serum: 0.94 uIU/mL (03-03 @ 06:26)          Consultant(s) Notes Reviewed:      Care Discussed with Consultants/Other Providers:

## 2022-03-06 NOTE — PROGRESS NOTE ADULT - SUBJECTIVE AND OBJECTIVE BOX
NEPHROLOGY     Patient seen and examined.    MEDICATIONS  (STANDING):  aspirin enteric coated 81 milliGRAM(s) Oral daily  atorvastatin 20 milliGRAM(s) Oral at bedtime  dextrose 40% Gel 15 Gram(s) Oral once  dextrose 5%. 1000 milliLiter(s) (50 mL/Hr) IV Continuous <Continuous>  dextrose 5%. 1000 milliLiter(s) (100 mL/Hr) IV Continuous <Continuous>  dextrose 50% Injectable 25 Gram(s) IV Push once  dextrose 50% Injectable 12.5 Gram(s) IV Push once  dextrose 50% Injectable 25 Gram(s) IV Push once  dronabinol 2.5 milliGRAM(s) Oral two times a day  glucagon  Injectable 1 milliGRAM(s) IntraMuscular once  heparin   Injectable 5000 Unit(s) SubCutaneous every 12 hours  insulin glargine Injectable (LANTUS) 8 Unit(s) SubCutaneous at bedtime  insulin lispro (ADMELOG) corrective regimen sliding scale   SubCutaneous three times a day before meals  pantoprazole    Tablet 40 milliGRAM(s) Oral before breakfast  tamsulosin 0.4 milliGRAM(s) Oral at bedtime    VITALS:  T(C): , Max: 36.7 (03-06-22 @ 04:56)  T(F): , Max: 98 (03-06-22 @ 04:56)  HR: 82 (03-06-22 @ 11:48)  BP: 111/70 (03-06-22 @ 11:48)  RR: 18 (03-06-22 @ 11:48)  SpO2: 97% (03-06-22 @ 11:48)    I and O's:    03-05 @ 07:01  -  03-06 @ 07:00  --------------------------------------------------------  IN: 1270 mL / OUT: 2000 mL / NET: -730 mL    03-06 @ 07:01  -  03-06 @ 13:38  --------------------------------------------------------  IN: 440 mL / OUT: 200 mL / NET: 240 mL    PHYSICAL EXAM:  Constitutional: NAD, Alert  HEENT: NCAT, MMM  Neck: Supple, No JVD  Respiratory: CTA-b/l  Cardiovascular: RRR s1s2, no m/r/g  Gastrointestinal: BS+, soft, NT/ND  : (+)berry  Extremities: No peripheral edema b/l  Neurological: no focal deficits; strength grossly intact  Back: no CVAT b/l  Skin: No rashes, no nevi    LABS:    03-05    142  |  103  |  18  ----------------------------<  173<H>  4.1   |  22  |  1.04    Ca    8.9      05 Mar 2022 10:18     NEPHROLOGY     Patient seen and examined sitting on bed, no pain, no sob, currently in no acute distress    MEDICATIONS  (STANDING):  aspirin enteric coated 81 milliGRAM(s) Oral daily  atorvastatin 20 milliGRAM(s) Oral at bedtime  dextrose 40% Gel 15 Gram(s) Oral once  dextrose 5%. 1000 milliLiter(s) (50 mL/Hr) IV Continuous <Continuous>  dextrose 5%. 1000 milliLiter(s) (100 mL/Hr) IV Continuous <Continuous>  dextrose 50% Injectable 25 Gram(s) IV Push once  dextrose 50% Injectable 12.5 Gram(s) IV Push once  dextrose 50% Injectable 25 Gram(s) IV Push once  dronabinol 2.5 milliGRAM(s) Oral two times a day  glucagon  Injectable 1 milliGRAM(s) IntraMuscular once  heparin   Injectable 5000 Unit(s) SubCutaneous every 12 hours  insulin glargine Injectable (LANTUS) 8 Unit(s) SubCutaneous at bedtime  insulin lispro (ADMELOG) corrective regimen sliding scale   SubCutaneous three times a day before meals  pantoprazole    Tablet 40 milliGRAM(s) Oral before breakfast  tamsulosin 0.4 milliGRAM(s) Oral at bedtime    VITALS:  T(C): , Max: 36.7 (03-06-22 @ 04:56)  T(F): , Max: 98 (03-06-22 @ 04:56)  HR: 82 (03-06-22 @ 11:48)  BP: 111/70 (03-06-22 @ 11:48)  RR: 18 (03-06-22 @ 11:48)  SpO2: 97% (03-06-22 @ 11:48)    I and O's:    03-05 @ 07:01  -  03-06 @ 07:00  --------------------------------------------------------  IN: 1270 mL / OUT: 2000 mL / NET: -730 mL    03-06 @ 07:01  -  03-06 @ 13:38  --------------------------------------------------------  IN: 440 mL / OUT: 200 mL / NET: 240 mL    PHYSICAL EXAM:  Constitutional: NAD, Alert  HEENT: NCAT, MMM  Neck: Supple, No JVD  Respiratory: CTA-b/l  Cardiovascular: RRR s1s2, no m/r/g  Gastrointestinal: BS+, soft, NT/ND  : (+)berry  Extremities: No peripheral edema b/l  Neurological: no focal deficits; strength grossly intact  Back: no CVAT b/l  Skin: No rashes, no nevi    LABS:    03-05    142  |  103  |  18  ----------------------------<  173<H>  4.1   |  22  |  1.04    Ca    8.9      05 Mar 2022 10:18

## 2022-03-06 NOTE — PROGRESS NOTE ADULT - SUBJECTIVE AND OBJECTIVE BOX
CARDIOLOGY     PROGRESS  NOTE   ________________________________________________    CHIEF COMPLAINT:Patient is a 71y old  Male who presents with a chief complaint of urinary retention (03 Mar 2022 18:12)  no complain.  	  REVIEW OF SYSTEMS:  CONSTITUTIONAL: No fever, weight loss, or fatigue  EYES: No eye pain, visual disturbances, or discharge  ENT:  No difficulty hearing, tinnitus, vertigo; No sinus or throat pain  NECK: No pain or stiffness  RESPIRATORY: No cough, wheezing, chills or hemoptysis; No Shortness of Breath  CARDIOVASCULAR: No chest pain, palpitations, passing out, dizziness, or leg swelling  GASTROINTESTINAL: No abdominal or epigastric pain. No nausea, vomiting, or hematemesis; No diarrhea or constipation. No melena or hematochezia.  GENITOURINARY: No dysuria, frequency, hematuria, or incontinence  NEUROLOGICAL: No headaches, memory loss, loss of strength, numbness, or tremors  SKIN: No itching, burning, rashes, or lesions   LYMPH Nodes: No enlarged glands  ENDOCRINE: No heat or cold intolerance; No hair loss  MUSCULOSKELETAL: No joint pain or swelling; No muscle, back, or extremity pain  PSYCHIATRIC: No depression, anxiety, mood swings, or difficulty sleeping  HEME/LYMPH: No easy bruising, or bleeding gums  ALLERGY AND IMMUNOLOGIC: No hives or eczema	    [ ] All others negative	  [ ] Unable to obtain    PHYSICAL EXAM:  T(C): 36.7 (03-06-22 @ 04:56), Max: 36.7 (03-06-22 @ 04:56)  HR: 76 (03-06-22 @ 04:56) (70 - 76)  BP: 128/83 (03-06-22 @ 04:56) (126/77 - 130/79)  RR: 17 (03-06-22 @ 04:56) (17 - 18)  SpO2: 97% (03-06-22 @ 04:56) (97% - 99%)  Wt(kg): --  I&O's Summary    05 Mar 2022 07:01  -  06 Mar 2022 07:00  --------------------------------------------------------  IN: 1270 mL / OUT: 2000 mL / NET: -730 mL        Appearance: Normal	  HEENT:   Normal oral mucosa, PERRL, EOMI	  Lymphatic: No lymphadenopathy  Cardiovascular: Normal S1 S2, No JVD, + murmurs, No edema  Respiratory: rhonchi  Psychiatry: A & O x 3, Mood & affect appropriate  Gastrointestinal:  Soft, Non-tender, + BS	  Skin: No rashes, No ecchymoses, No cyanosis	  Neurologic: Non-focal  Extremities: Normal range of motion, No clubbing, cyanosis or edema  Vascular: Peripheral pulses palpable 2+ bilaterally    MEDICATIONS  (STANDING):  aspirin enteric coated 81 milliGRAM(s) Oral daily  atorvastatin 20 milliGRAM(s) Oral at bedtime  dextrose 40% Gel 15 Gram(s) Oral once  dextrose 5%. 1000 milliLiter(s) (50 mL/Hr) IV Continuous <Continuous>  dextrose 5%. 1000 milliLiter(s) (100 mL/Hr) IV Continuous <Continuous>  dextrose 50% Injectable 25 Gram(s) IV Push once  dextrose 50% Injectable 12.5 Gram(s) IV Push once  dextrose 50% Injectable 25 Gram(s) IV Push once  glucagon  Injectable 1 milliGRAM(s) IntraMuscular once  heparin   Injectable 5000 Unit(s) SubCutaneous every 12 hours  insulin lispro (ADMELOG) corrective regimen sliding scale   SubCutaneous three times a day before meals  tamsulosin 0.4 milliGRAM(s) Oral at bedtime      TELEMETRY: 	    ECG:  	  RADIOLOGY:  OTHER: 	  	  LABS:	 	    CARDIAC MARKERS:            03-05    142  |  103  |  18  ----------------------------<  173<H>  4.1   |  22  |  1.04    Ca    8.9      05 Mar 2022 10:18      proBNP:   Lipid Profile: Cholesterol 196  LDL --  HDL 69  TG 84  Cholesterol 217  LDL --  HDL 76  TG 92    HgA1c:   TSH: Thyroid Stimulating Hormone, Serum: 0.94 uIU/mL (03-03 @ 06:26)  Thyroid Stimulating Hormone, Serum: 0.76 uIU/mL (02-21 @ 09:06)          Assessment and plan  ---------------------------  71 year-old man with history of hypertension and type 2 diabetes. He presented overnight to the [facility] ER with loss of appetite for the past 2 weeks. He admits to intermittent vomiting, but denies nausea; he admits to feeling cold all the time. Noted on admission to have GINA with a creatinine of 1.4mg/dL.  pan CT was done in the ED and an urinary bladder outlet obstruction with bilateral [place] was identified.  Ramírez placed in ER with sudden 600cc urine output. He admits to urinary frequency for the past 2 weeks; denies known history of CKD/GINA.   pt was recently admitted sec to uncontrolled dm and is scheduled for stress test this coming Monday.  no chest pain.  asa daily  continue lipitor  urinary retention, urology noted  will re start losartan as renal function improves    check psa  renal function has improved  scheduled for nuclear stress test on Tuesday as out pt.  repeat ultrasound of the kidney/ urology follow up

## 2022-03-06 NOTE — PROGRESS NOTE ADULT - ASSESSMENT
Assessment  DMT2: 71y Male with recently dx DM T2 with hyperglycemia, A1C 11.5%, was recently diagnosed and DC on basal insulin + Metformin,  readmitted, now on insulin coverage only, blood sugars in overall acceptable range/slightly elevated, no hypoglycemias, alert and comfortable, reports eating meals with somewhat improving appetite.  ?Thyroid Nodule: no known history, per imaging "1.2 cm low-attenuation focus in the left thyroid lobe", euthyroid.  Urinary Retention: on medications, stable, monitored, FU urology.  HLD: On statin.        Nini Carpenter MD  Cell: [phone]  Office: [phone]     Assessment  DMT2: 71y Male with recently dx DM T2 with hyperglycemia, A1C 11.5%, was recently diagnosed and DC on basal insulin + Metformin,  readmitted, now on insulin coverage only,  blood sugars in overall acceptable range/slightly elevated, no hypoglycemias, alert and comfortable, reports eating meals with somewhat improving appetite.  ?Thyroid Nodule: no known history, per imaging "1.2 cm low-attenuation focus in the left thyroid lobe", euthyroid.  Urinary Retention: on medications, stable, monitored, FU urology.  HLD: On statin.        Nini Carpenter MD  Cell: [phone]  Office: [phone]

## 2022-03-06 NOTE — PROGRESS NOTE ADULT - ASSESSMENT
70 yo M   recnetly dx  dm.  was   seen by  sher,  now returns   No abdominal pain, no fever, no chest pain, no sob no dysuria.  COVID vaccinated no sick contacts.      pt  was   d/.c  by  myself on 2/24/22,  and  was  seen by  danya guevara  and  sher littlejohn,  who  will be  f/p  on pt          c/o   with loss of appetite for the past 2 weeks, in associated with impaired small/impaired taste.   He admits to intermittent vomiting, but denies nausea;    Noted on admission to have GINA with a creatinine of 1.4    CT I+ in the ER to have bilateral hydro to level of bladder, consistent with bladder outlet obstruction.    Ramírez placed in ER with sudden 600cc urine output.  seen by urology  on  flomax  DM,  follow  fs   on asa, lipitor   rx plan per sher littlejohn. /  given his  fs,  lantus  was  stopped   on iv fluids on  dvt ppx  tov   is plan in  am/  family concerne d about his po intake/ psycha nd gi ariel fairchild           72 yo M   recnetly dx  dm.  was   seen by  sher,  now returns   No abdominal pain, no fever, no chest pain, no sob no dysuria.  COVID vaccinated no sick contacts.      pt  was   d/.c  by  myself on 2/24/22,  and  was  seen by  danya guevara  and  sher littlejohn,  who  will be  f/p  on pt          c/o   with loss of appetite for the past 2 weeks, in associated with impaired small/impaired taste.   He admits to intermittent vomiting, but denies nausea;    Noted on admission to have GINA with a creatinine of 1.4    CT I+ in the ER to have bilateral hydro to level of bladder, consistent with bladder outlet obstruction.    Ramírez placed in ER with sudden 600cc urine output.  seen by urology  on  flomax  DM,  follow  fs   on asa, lipitor   rx plan per sher littlejohn. /  given his  fs,  lantus  was  stopped   on iv fluids on  dvt ppx  tov   is plan in  am/  family concerne d about his po intake/  gi ariel fairchild  pt states he only wants  chinese  food

## 2022-03-06 NOTE — CONSULT NOTE ADULT - ASSESSMENT
failure to thrive    CT shows no evidence of malignancy  his appetite improved after berry insertion  he ate most of his breakfast  will start proton pump inhibitor and marinol and monitor po intake  d/w patient     I reviewed the overnight course of events on the unit, re-confirming the patient history. I discussed the care with the patient and their family  The plan of care was discussed with the physician assistant and modifications were made to the notation where appropriate.   Differential diagnosis and plan of care discussed with patient after the evaluation  35 minutes spent on total encounter of which more than fifty percent of the encounter was spent counseling and/or coordinating care by the attending physician.  Advanced care planning was discussed with patient and family.  Advanced care planning forms were reviewed and discussed.  Risks, benefits and alternatives of gastroenterologic procedures were discussed in detail and all questions were answered.

## 2022-03-07 VITALS
TEMPERATURE: 98 F | RESPIRATION RATE: 18 BRPM | DIASTOLIC BLOOD PRESSURE: 83 MMHG | OXYGEN SATURATION: 99 % | HEART RATE: 96 BPM | SYSTOLIC BLOOD PRESSURE: 122 MMHG

## 2022-03-07 LAB
GLUCOSE BLDC GLUCOMTR-MCNC: 119 MG/DL — HIGH (ref 70–99)
GLUCOSE BLDC GLUCOMTR-MCNC: 142 MG/DL — HIGH (ref 70–99)
GLUCOSE BLDC GLUCOMTR-MCNC: 179 MG/DL — HIGH (ref 70–99)

## 2022-03-07 RX ORDER — ASPIRIN/CALCIUM CARB/MAGNESIUM 324 MG
1 TABLET ORAL
Qty: 0 | Refills: 0 | DISCHARGE
Start: 2022-03-07

## 2022-03-07 RX ORDER — ATORVASTATIN CALCIUM 80 MG/1
1 TABLET, FILM COATED ORAL
Qty: 0 | Refills: 0 | DISCHARGE
Start: 2022-03-07

## 2022-03-07 RX ORDER — METFORMIN HYDROCHLORIDE 850 MG/1
1 TABLET ORAL
Qty: 60 | Refills: 0
Start: 2022-03-07 | End: 2022-04-05

## 2022-03-07 RX ORDER — DRONABINOL 2.5 MG
1 CAPSULE ORAL
Qty: 60 | Refills: 0
Start: 2022-03-07 | End: 2022-04-05

## 2022-03-07 RX ORDER — LOSARTAN POTASSIUM 100 MG/1
50 TABLET, FILM COATED ORAL DAILY
Refills: 0 | Status: DISCONTINUED | OUTPATIENT
Start: 2022-03-07 | End: 2022-03-07

## 2022-03-07 RX ORDER — TAMSULOSIN HYDROCHLORIDE 0.4 MG/1
1 CAPSULE ORAL
Qty: 30 | Refills: 0
Start: 2022-03-07 | End: 2022-04-05

## 2022-03-07 RX ORDER — INSULIN GLARGINE 100 [IU]/ML
8 INJECTION, SOLUTION SUBCUTANEOUS
Qty: 1 | Refills: 0
Start: 2022-03-07 | End: 2022-04-05

## 2022-03-07 RX ORDER — LOSARTAN POTASSIUM 100 MG/1
1 TABLET, FILM COATED ORAL
Qty: 0 | Refills: 0 | DISCHARGE
Start: 2022-03-07

## 2022-03-07 RX ADMIN — HEPARIN SODIUM 5000 UNIT(S): 5000 INJECTION INTRAVENOUS; SUBCUTANEOUS at 17:45

## 2022-03-07 RX ADMIN — Medication 81 MILLIGRAM(S): at 13:02

## 2022-03-07 RX ADMIN — PANTOPRAZOLE SODIUM 40 MILLIGRAM(S): 20 TABLET, DELAYED RELEASE ORAL at 05:17

## 2022-03-07 RX ADMIN — TAMSULOSIN HYDROCHLORIDE 0.4 MILLIGRAM(S): 0.4 CAPSULE ORAL at 18:50

## 2022-03-07 RX ADMIN — Medication 2.5 MILLIGRAM(S): at 05:17

## 2022-03-07 RX ADMIN — HEPARIN SODIUM 5000 UNIT(S): 5000 INJECTION INTRAVENOUS; SUBCUTANEOUS at 05:18

## 2022-03-07 RX ADMIN — Medication 1: at 13:02

## 2022-03-07 NOTE — PROGRESS NOTE ADULT - PROBLEM SELECTOR PROBLEM 5
Acute kidney injury (nontraumatic)

## 2022-03-07 NOTE — PROGRESS NOTE ADULT - PROBLEM SELECTOR PLAN 2
Suggest to FU as out patient for further workup and observation.
Suggest to FU as out patient for further workup and observation.  Discussed plan with patient.
Suggest to FU as out patient for further workup and observation.  Discussed plan with patient.

## 2022-03-07 NOTE — PROGRESS NOTE ADULT - SUBJECTIVE AND OBJECTIVE BOX
CARDIOLOGY     PROGRESS  NOTE   ________________________________________________    CHIEF COMPLAINT:Patient is a 71y old  Male who presents with a chief complaint of urinary retention (03 Mar 2022 18:12)  no complain.  	  REVIEW OF SYSTEMS:  CONSTITUTIONAL: No fever, weight loss, or fatigue  EYES: No eye pain, visual disturbances, or discharge  ENT:  No difficulty hearing, tinnitus, vertigo; No sinus or throat pain  NECK: No pain or stiffness  RESPIRATORY: No cough, wheezing, chills or hemoptysis; No Shortness of Breath  CARDIOVASCULAR: No chest pain, palpitations, passing out, dizziness, or leg swelling  GASTROINTESTINAL: No abdominal or epigastric pain. No nausea, vomiting, or hematemesis; No diarrhea or constipation. No melena or hematochezia.  GENITOURINARY: No dysuria, frequency, hematuria, or incontinence  NEUROLOGICAL: No headaches, memory loss, loss of strength, numbness, or tremors  SKIN: No itching, burning, rashes, or lesions   LYMPH Nodes: No enlarged glands  ENDOCRINE: No heat or cold intolerance; No hair loss  MUSCULOSKELETAL: No joint pain or swelling; No muscle, back, or extremity pain  PSYCHIATRIC: No depression, anxiety, mood swings, or difficulty sleeping  HEME/LYMPH: No easy bruising, or bleeding gums  ALLERGY AND IMMUNOLOGIC: No hives or eczema	    [ ] All others negative	  [ ] Unable to obtain    PHYSICAL EXAM:  T(C): 36.3 (03-07-22 @ 05:15), Max: 36.6 (03-06-22 @ 11:48)  HR: 77 (03-07-22 @ 05:15) (69 - 82)  BP: 129/83 (03-07-22 @ 05:15) (111/70 - 132/86)  RR: 16 (03-07-22 @ 05:15) (16 - 18)  SpO2: 97% (03-07-22 @ 05:15) (97% - 97%)  Wt(kg): --  I&O's Summary    06 Mar 2022 07:01  -  07 Mar 2022 07:00  --------------------------------------------------------  IN: 1080 mL / OUT: 1250 mL / NET: -170 mL        Appearance: Normal	  HEENT:   Normal oral mucosa, PERRL, EOMI	  Lymphatic: No lymphadenopathy  Cardiovascular: Normal S1 S2, No JVD, + murmurs, No edema  Respiratory: Lungs clear to auscultation	  Psychiatry: A & O x 3, Mood & affect appropriate  Gastrointestinal:  Soft, Non-tender, + BS	  Skin: No rashes, No ecchymoses, No cyanosis	  Neurologic: Non-focal  Extremities: Normal range of motion, No clubbing, cyanosis or edema  Vascular: Peripheral pulses palpable 2+ bilaterally    MEDICATIONS  (STANDING):  aspirin enteric coated 81 milliGRAM(s) Oral daily  atorvastatin 20 milliGRAM(s) Oral at bedtime  dextrose 40% Gel 15 Gram(s) Oral once  dextrose 5%. 1000 milliLiter(s) (50 mL/Hr) IV Continuous <Continuous>  dextrose 5%. 1000 milliLiter(s) (100 mL/Hr) IV Continuous <Continuous>  dextrose 50% Injectable 25 Gram(s) IV Push once  dextrose 50% Injectable 12.5 Gram(s) IV Push once  dextrose 50% Injectable 25 Gram(s) IV Push once  dronabinol 2.5 milliGRAM(s) Oral two times a day  glucagon  Injectable 1 milliGRAM(s) IntraMuscular once  heparin   Injectable 5000 Unit(s) SubCutaneous every 12 hours  insulin glargine Injectable (LANTUS) 8 Unit(s) SubCutaneous at bedtime  insulin lispro (ADMELOG) corrective regimen sliding scale   SubCutaneous three times a day before meals  pantoprazole    Tablet 40 milliGRAM(s) Oral before breakfast  tamsulosin 0.4 milliGRAM(s) Oral at bedtime      TELEMETRY: 	    ECG:  	  RADIOLOGY:  OTHER: 	  	  LABS:	 	    CARDIAC MARKERS:            03-05    142  |  103  |  18  ----------------------------<  173<H>  4.1   |  22  |  1.04    Ca    8.9      05 Mar 2022 10:18      proBNP:   Lipid Profile: Cholesterol 196  LDL --  HDL 69  TG 84  Cholesterol 217  LDL --  HDL 76  TG 92    HgA1c:   TSH: Thyroid Stimulating Hormone, Serum: 0.94 uIU/mL (03-03 @ 06:26)  Thyroid Stimulating Hormone, Serum: 0.76 uIU/mL (02-21 @ 09:06)          Assessment and plan  ---------------------------  71 year-old man with history of hypertension and type 2 diabetes. He presented overnight to the [facility] ER with loss of appetite for the past 2 weeks. He admits to intermittent vomiting, but denies nausea; he admits to feeling cold all the time. Noted on admission to have GINA with a creatinine of 1.4mg/dL.  pan CT was done in the ED and an urinary bladder outlet obstruction with bilateral [place] was identified.  Berry placed in ER with sudden 600cc urine output. He admits to urinary frequency for the past 2 weeks; denies known history of CKD/GINA.   pt was recently admitted sec to uncontrolled dm and is scheduled for stress test this coming Monday.  no chest pain.  asa daily  continue lipitor  urinary retention, urology noted  will re start losartan as renal function improves    check psa  renal function has improved  scheduled for nuclear stress test on Tuesday as out pt.  repeat ultrasound of the kidney/ urology follow up  berry dc / void trial

## 2022-03-07 NOTE — DISCHARGE NOTE PROVIDER - CARE PROVIDERS DIRECT ADDRESSES
[email or web address],[email or web address],DirectAddress_Unknown

## 2022-03-07 NOTE — PROGRESS NOTE ADULT - SUBJECTIVE AND OBJECTIVE BOX
afberile  REVIEW OF SYSTEMS:  GEN: no fever,    no chills  RESP: no SOB,   no cough  CVS: no chest pain,   no palpitations  GI: no abdominal pain,   no nausea,   no vomiting,   no constipation,   no diarrhea  : no dysuria,   no frequency  NEURO: no headache,   no dizziness  PSYCH: no depression,   not anxious  Derm : no rash    MEDICATIONS  (STANDING):  aspirin enteric coated 81 milliGRAM(s) Oral daily  atorvastatin 20 milliGRAM(s) Oral at bedtime  dextrose 40% Gel 15 Gram(s) Oral once  dextrose 5%. 1000 milliLiter(s) (50 mL/Hr) IV Continuous <Continuous>  dextrose 5%. 1000 milliLiter(s) (100 mL/Hr) IV Continuous <Continuous>  dextrose 50% Injectable 25 Gram(s) IV Push once  dextrose 50% Injectable 12.5 Gram(s) IV Push once  dextrose 50% Injectable 25 Gram(s) IV Push once  dronabinol 2.5 milliGRAM(s) Oral two times a day  glucagon  Injectable 1 milliGRAM(s) IntraMuscular once  heparin   Injectable 5000 Unit(s) SubCutaneous every 12 hours  insulin glargine Injectable (LANTUS) 8 Unit(s) SubCutaneous at bedtime  insulin lispro (ADMELOG) corrective regimen sliding scale   SubCutaneous three times a day before meals  pantoprazole    Tablet 40 milliGRAM(s) Oral before breakfast  tamsulosin 0.4 milliGRAM(s) Oral at bedtime    MEDICATIONS  (PRN):      Vital Signs Last 24 Hrs  T(C): 36.3 (07 Mar 2022 05:15), Max: 36.6 (06 Mar 2022 11:48)  T(F): 97.4 (07 Mar 2022 05:15), Max: 97.9 (06 Mar 2022 11:48)  HR: 77 (07 Mar 2022 05:15) (69 - 82)  BP: 129/83 (07 Mar 2022 05:15) (111/70 - 132/86)  BP(mean): --  RR: 16 (07 Mar 2022 05:15) (16 - 18)  SpO2: 97% (07 Mar 2022 05:15) (97% - 97%)  CAPILLARY BLOOD GLUCOSE      POCT Blood Glucose.: 142 mg/dL (07 Mar 2022 08:12)  POCT Blood Glucose.: 136 mg/dL (06 Mar 2022 22:16)  POCT Blood Glucose.: 147 mg/dL (06 Mar 2022 17:14)  POCT Blood Glucose.: 177 mg/dL (06 Mar 2022 12:18)  POCT Blood Glucose.: 177 mg/dL (06 Mar 2022 08:18)    I&O's Summary    06 Mar 2022 07:01  -  07 Mar 2022 07:00  --------------------------------------------------------  IN: 1080 mL / OUT: 1250 mL / NET: -170 mL        PHYSICAL EXAM:  HEAD:  Atraumatic, Normocephalic  NECK: Supple, No   JVD  CHEST/LUNG:   no     rales,     no,    rhonchi  HEART: Regular rate and rhythm;         murmur  ABDOMEN: Soft, Nontender, ;   EXTREMITIES:  no      edema  NEUROLOGY:  alert    LABS:    03-05    142  |  103  |  18  ----------------------------<  173<H>  4.1   |  22  |  1.04    Ca    8.9      05 Mar 2022 10:18                      Thyroid Stimulating Hormone, Serum: 0.94 uIU/mL (03-03 @ 06:26)          Consultant(s) Notes Reviewed:      Care Discussed with Consultants/Other Providers:

## 2022-03-07 NOTE — DISCHARGE NOTE PROVIDER - HOSPITAL COURSE
70 yo M   recnetly dx  dm.  was   seen by  sher,  now returns   No abdominal pain, no fever, no chest pain, no sob no dysuria.  COVID vaccinated no sick contacts.      pt  was   d/.c  by  myself on 2/24/22,  and  was  seen by  danya guevara  and  sher littlejohn,  who  will be  f/p  on pt          c/o   with loss of appetite for the past 2 weeks, in associated with impaired small/impaired taste.   He admits to intermittent vomiting, but denies nausea;    Noted on admission to have GINA with a creatinine of 1.4    CT I+ in the ER to have bilateral hydro to level of bladder, consistent with bladder outlet obstruction.    Berry placed in ER with sudden 600cc urine output.  seen by urology  on  flomax  DM,  follow  fs   on asa, lipitor   rx plan per sher littlejohn. on  dvt ppx  tov   is plan in  am/  family concerne d about his po intake/  gi eval cindy fairchild  pt states he only wants  chinese  food  defer  home  rx  for  dm, to  sher/ , latasha, pt  may not  need  home  insulin/  check  bladder  scan  this  am   per  RN, earlier  pvr wa s 400. then pt voided  /  f/p on bladder scan and  then will  juan david  for   d/c    pe r urology  dr g goldberg,   d/c  pt with berry     72 yo M   recnetly dx  dm.  was   seen by  sher,  now returns   No abdominal pain, no fever, no chest pain, no sob no dysuria.  COVID vaccinated no sick contacts.      pt  was   d/.c  by  myself on 2/24/22,  and  was  seen by  danya guevara  and  sher littlejohn,  who  will be  f/p  on pt          c/o   with loss of appetite for the past 2 weeks, in associated with impaired small/impaired taste.   He admits to intermittent vomiting, but denies nausea;    Noted on admission to have GINA with a creatinine of 1.4    CT I+ in the ER to have bilateral hydro to level of bladder, consistent with bladder outlet obstruction.    Berry placed in ER with sudden 600cc urine output.  seen by urology  on  flomax  DM,  follow  fs   on asa, lipitor   rx plan per sher littlejohn. on  dvt ppx  tov   is plan in  am/  family concerne d about his po intake/  gi eval cindy fairchild  pt states he only wants  chinese  food/  and will eat well,  when fed by family.  wt is 65 kg. / does  not have protein calorie malnutrition  defer  home  rx  for  dm, to  sher/ , latasha, pt  may not  need  home  insulin/  check  bladder  scan  this  am   per  RN, earlier  pvr wa s 400. then pt voided  /  f/p on bladder scan and  then will  juan david  for   d/c    pe r urology  dr g goldberg,   d/c  pt with berry

## 2022-03-07 NOTE — DISCHARGE NOTE PROVIDER - NSDCMRMEDTOKEN_GEN_ALL_CORE_FT
aspirin 81 mg oral delayed release tablet: 1 tab(s) orally once a day  atorvastatin 20 mg oral tablet: 1 tab(s) orally once a day (at bedtime)  Basaglar KwikPen 100 units/mL subcutaneous solution: 18 unit(s) subcutaneous once a day (at bedtime)   losartan 50 mg oral tablet: 1 tab(s) orally once a day  metFORMIN 1000 mg oral tablet: 1 tab(s) orally 2 times a day    aspirin 81 mg oral delayed release tablet: 1 tab(s) orally once a day  atorvastatin 20 mg oral tablet: 1 tab(s) orally once a day (at bedtime)  Basaglar KwikPen 100 units/mL subcutaneous solution: 8 unit(s) subcutaneous once a day (at bedtime)   dronabinol 2.5 mg oral capsule: 1 cap(s) orally 2 times a day for appetite MDD:5 mg  losartan 50 mg oral tablet: 1 tab(s) orally once a day  metFORMIN 1000 mg oral tablet: 1 tab(s) orally 2 times a day   tamsulosin 0.4 mg oral capsule: 1 cap(s) orally once a day (at bedtime)   aspirin 81 mg oral delayed release tablet: 1 tab(s) orally once a day  atorvastatin 20 mg oral tablet: 1 tab(s) orally once a day (at bedtime)  Basaglar KwikPen 100 units/mL subcutaneous solution: 6 unit(s) subcutaneous once a day (at bedtime)   metFORMIN 1000 mg oral tablet: 1 tab(s) orally 2 times a day   tamsulosin 0.4 mg oral capsule: 1 cap(s) orally once a day (at bedtime)

## 2022-03-07 NOTE — DISCHARGE NOTE PROVIDER - PROVIDER TOKENS
PROVIDER:[TOKEN:[[id number]]] PROVIDER:[TOKEN:[[id number]],FOLLOWUP:[1 week]],PROVIDER:[TOKEN:[[id number]],FOLLOWUP:[1-3 days]],PROVIDER:[TOKEN:[[id number]],FOLLOWUP:[1 week]]

## 2022-03-07 NOTE — PROGRESS NOTE ADULT - SUBJECTIVE AND OBJECTIVE BOX
Chief complaint  Patient is a 71y old  Male who presents with a chief complaint of urinary retention (03 Mar 2022 18:12)   Review of systems  Patient in bed, looks comfortable, no hypoglycemic episodes.    Labs and Fingersticks  CAPILLARY BLOOD GLUCOSE      POCT Blood Glucose.: 179 mg/dL (07 Mar 2022 12:30)  POCT Blood Glucose.: 142 mg/dL (07 Mar 2022 08:12)  POCT Blood Glucose.: 136 mg/dL (06 Mar 2022 22:16)  POCT Blood Glucose.: 147 mg/dL (06 Mar 2022 17:14)                        Medications  MEDICATIONS  (STANDING):  aspirin enteric coated 81 milliGRAM(s) Oral daily  atorvastatin 20 milliGRAM(s) Oral at bedtime  dextrose 40% Gel 15 Gram(s) Oral once  dextrose 5%. 1000 milliLiter(s) (50 mL/Hr) IV Continuous <Continuous>  dextrose 5%. 1000 milliLiter(s) (100 mL/Hr) IV Continuous <Continuous>  dextrose 50% Injectable 25 Gram(s) IV Push once  dextrose 50% Injectable 12.5 Gram(s) IV Push once  dextrose 50% Injectable 25 Gram(s) IV Push once  dronabinol 2.5 milliGRAM(s) Oral two times a day  glucagon  Injectable 1 milliGRAM(s) IntraMuscular once  heparin   Injectable 5000 Unit(s) SubCutaneous every 12 hours  insulin glargine Injectable (LANTUS) 8 Unit(s) SubCutaneous at bedtime  insulin lispro (ADMELOG) corrective regimen sliding scale   SubCutaneous three times a day before meals  losartan 50 milliGRAM(s) Oral daily  pantoprazole    Tablet 40 milliGRAM(s) Oral before breakfast  tamsulosin 0.4 milliGRAM(s) Oral at bedtime      Physical Exam  General: Patient comfortable in bed  Vital Signs Last 12 Hrs  T(F): 97.5 (03-07-22 @ 11:28), Max: 97.5 (03-07-22 @ 11:28)  HR: 73 (03-07-22 @ 11:28) (73 - 77)  BP: 97/64 (03-07-22 @ 11:28) (97/64 - 129/83)  BP(mean): --  RR: 18 (03-07-22 @ 11:28) (16 - 18)  SpO2: 97% (03-07-22 @ 11:28) (97% - 97%)  Neck: No palpable thyroid nodules.  CVS: S1S2, No murmurs  Respiratory: No wheezing, no crepitations  GI: Abdomen soft, bowel sounds positive  Musculoskeletal:  edema lower extremities.   Skin: No skin rashes, no ecchymosis    Diagnostics

## 2022-03-07 NOTE — DISCHARGE NOTE PROVIDER - CARE PROVIDER_API CALL
Goldberg, Gary D D  UROLOGY  [address] [place] [address], [address]  [place] [address]  Phone: [phone]  Fax: [phone]  Follow Up Time:    Goldberg, Gary D D  UROLOGY  [address], [address]  [place] [address]  Phone: [phone]  Fax: [phone]  Follow Up Time: 1 week    Edwardo Eastman)  Internal Medicine  [address], [address] [place] [address]  Phone: [phone]  Fax: [phone]  Follow Up Time: 1-3 days    Nini Carpenter)  EndocrinologyMetabDiabetes; Internal Medicine  [address] [place] [address]  Phone: [phone]  Fax: [phone]  Follow Up Time: 1 week

## 2022-03-07 NOTE — PROGRESS NOTE ADULT - PROVIDER SPECIALTY LIST ADULT
Cardiology
Internal Medicine
Cardiology
Gastroenterology
Internal Medicine
Nephrology
Internal Medicine
Endocrinology

## 2022-03-07 NOTE — PROGRESS NOTE ADULT - ASSESSMENT
failure to thrive    CT shows no evidence of malignancy  his appetite improved after Ramírez insertion  will start proton pump inhibitor and marinol and monitor po intake  d/w patient     I reviewed the overnight course of events on the unit, re-confirming the patient history. I discussed the care with the patient and their family  The plan of care was discussed with the physician assistant and modifications were made to the notation where appropriate.   Differential diagnosis and plan of care discussed with patient after the evaluation  35 minutes spent on total encounter of which more than fifty percent of the encounter was spent counseling and/or coordinating care by the attending physician.  Advanced care planning was discussed with patient and family.  Advanced care planning forms were reviewed and discussed.  Risks, benefits and alternatives of gastroenterologic procedures were discussed in detail and all questions were answered.

## 2022-03-07 NOTE — DISCHARGE NOTE NURSING/CASE MANAGEMENT/SOCIAL WORK - PATIENT PORTAL LINK FT
You can access the FollowMyHealth Patient Portal offered by [facility] by registering at the following website: [email or web address]. By joining [employer]’s FollowMyHealth portal, you will also be able to view your health information using other applications (apps) compatible with our system.

## 2022-03-07 NOTE — PROGRESS NOTE ADULT - SUBJECTIVE AND OBJECTIVE BOX
[facility]  Dash Thomason PA-C  [facility] [place] [address]  [phone]      INTERVAL HPI/OVERNIGHT EVENTS:  pt seen and examined, no new events  poor PO intake, doesn't like food here    MEDICATIONS  (STANDING):  aspirin enteric coated 81 milliGRAM(s) Oral daily  atorvastatin 20 milliGRAM(s) Oral at bedtime  dextrose 40% Gel 15 Gram(s) Oral once  dextrose 5%. 1000 milliLiter(s) (50 mL/Hr) IV Continuous <Continuous>  dextrose 5%. 1000 milliLiter(s) (100 mL/Hr) IV Continuous <Continuous>  dextrose 50% Injectable 25 Gram(s) IV Push once  dextrose 50% Injectable 12.5 Gram(s) IV Push once  dextrose 50% Injectable 25 Gram(s) IV Push once  dronabinol 2.5 milliGRAM(s) Oral two times a day  glucagon  Injectable 1 milliGRAM(s) IntraMuscular once  heparin   Injectable 5000 Unit(s) SubCutaneous every 12 hours  insulin glargine Injectable (LANTUS) 8 Unit(s) SubCutaneous at bedtime  insulin lispro (ADMELOG) corrective regimen sliding scale   SubCutaneous three times a day before meals  losartan 50 milliGRAM(s) Oral daily  pantoprazole    Tablet 40 milliGRAM(s) Oral before breakfast  tamsulosin 0.4 milliGRAM(s) Oral at bedtime    MEDICATIONS  (PRN):      Allergies    No Known Allergies    Intolerances        ROS:   General:  No wt loss, fevers, chills, night sweats, fatigue,   Eyes:  Good vision, no reported pain  ENT:  No sore throat, pain, runny nose, dysphagia  CV:  No pain, palpitations, hypo/hypertension  Resp:  No dyspnea, cough, tachypnea, wheezing  GI:  No pain, No nausea, No vomiting, No diarrhea, No constipation, No weight loss, No fever, No pruritis, No rectal bleeding, No tarry stools, No dysphagia,  :  No pain, bleeding, incontinence, nocturia  Muscle:  No pain, weakness  Neuro:  No weakness, tingling, memory problems  Psych:  No fatigue, insomnia, mood problems, depression  Endocrine:  No polyuria, polydipsia, cold/heat intolerance  Heme:  No petechiae, ecchymosis, easy bruisability  Skin:  No rash, tattoos, scars, edema      PHYSICAL EXAM:   Vital Signs:  Vital Signs Last 24 Hrs  T(C): 36.4 (07 Mar 2022 11:28), Max: 36.6 (06 Mar 2022 11:48)  T(F): 97.5 (07 Mar 2022 11:28), Max: 97.9 (06 Mar 2022 11:48)  HR: 73 (07 Mar 2022 11:28) (69 - 82)  BP: 97/64 (07 Mar 2022 11:28) (97/64 - 132/86)  BP(mean): --  RR: 18 (07 Mar 2022 11:28) (16 - 18)  SpO2: 97% (07 Mar 2022 11:28) (97% - 97%)  Daily     Daily     GENERAL:  Appears stated age,   HEENT:  NC/AT,    CHEST:  Full & symmetric excursion,   HEART:  Regular rhythm,  ABDOMEN:  Soft, non-tender, non-distended,  EXTEREMITIES:  no cyanosis  SKIN:  No rash  NEURO:  Alert,       LABS:                RADIOLOGY & ADDITIONAL TESTS:

## 2022-03-07 NOTE — PROGRESS NOTE ADULT - PROBLEM SELECTOR PLAN 4
Will continue statin, primary team FU

## 2022-03-07 NOTE — PROGRESS NOTE ADULT - PROBLEM SELECTOR PLAN 1
Will continue current insulin regimen for now. Will continue monitoring FS and FU.  Patient has low insulin requirement at this time; Can discontinue insulin as outpatient. Suggest DC on Janumet 50/1000 BID (discontinue prior Metformin), Endo FU 3 months.  Discussed plan with patient. Counseled for compliance with consistent low carb diet.
Will continue current insulin regimen for now. Will continue monitoring  blood sugars, will Follow up.  Patient counseled for compliance with consistent low carb diet.
Will start Lantus 8u at bedtime and continue Admelog correction scale coverage. Will continue monitoring FS and FU.  Patient has low insulin requirement at this time; Can possibly discontinue home insulin, will continue monitoring and FU DC recommendations.  Patient counseled for compliance with consistent low carb diet.

## 2022-03-07 NOTE — PROGRESS NOTE ADULT - PROBLEM SELECTOR PLAN 3
Continue medications, monitoring, FU primary team recommendations. .

## 2022-03-07 NOTE — PROGRESS NOTE ADULT - SUBJECTIVE AND OBJECTIVE BOX
Overnight events noted      VITAL:  T(C): , Max: 36.6 (03-06-22 @ 11:48)  T(F): , Max: 97.9 (03-06-22 @ 11:48)  HR: 77 (03-07-22 @ 05:15)  BP: 129/83 (03-07-22 @ 05:15)  BP(mean): --  RR: 16 (03-07-22 @ 05:15)  SpO2: 97% (03-07-22 @ 05:15)    PHYSICAL EXAM:  Constitutional: NAD, Alert  HEENT: NCAT, MMM  Neck: Supple, No JVD  Respiratory: CTA-b/l  Cardiovascular: RRR s1s2, no m/r/g  Gastrointestinal: BS+, soft, NT/ND  : (+)berry  Extremities: No peripheral edema b/l  Neurological: no focal deficits; strength grossly intact  Back: no CVAT b/l  Skin: No rashes, no nevi    LABS:  Na(142)/K(4.1)/Cl(103)/HCO3(22)/BUN(18)/Cr(1.04)Glu(173)/Ca(8.9)/Mg(--)/PO4(--)    03-05 @ 10:18      IMPRESSION: 71M w/ HTN and DM2; 3/3/22 p/w obstructive uropathy    (1)CKD - CKD2 with non-nephrotic range proteinuria - likely early diabetic nephropathy  (2)GINA - prerenally mediated - improved s/p berry placement  (3)Lytes - acceptable  (4) - potentially for TOV      RECOMMEND:  (1)No renal objection to restarting ARB  (2)No renal objection to TOV  (3)D/C planning per primary team; can f/u at my office on a prn basis            Harjit Sheldon MD  [facility]  Office: [phone]  Cell: [phone]       No pain, no sob  Berry removed this a.m.; states that he has voided once since berry removed      VITAL:  T(C): , Max: 36.6 (03-06-22 @ 11:48)  T(F): , Max: 97.9 (03-06-22 @ 11:48)  HR: 77 (03-07-22 @ 05:15)  BP: 129/83 (03-07-22 @ 05:15)  BP(mean): --  RR: 16 (03-07-22 @ 05:15)  SpO2: 97% (03-07-22 @ 05:15)    PHYSICAL EXAM:  Constitutional: NAD, Alert  HEENT: NCAT, MMM  Neck: Supple, No JVD  Respiratory: CTA-b/l  Cardiovascular: RRR s1s2, no m/r/g  Gastrointestinal: BS+, soft, NT/ND  : no berry  Extremities: No peripheral edema b/l  Neurological: no focal deficits; strength grossly intact  Back: no CVAT b/l  Skin: No rashes, no nevi    LABS:  Na(142)/K(4.1)/Cl(103)/HCO3(22)/BUN(18)/Cr(1.04)Glu(173)/Ca(8.9)/Mg(--)/PO4(--)    03-05 @ 10:18      IMPRESSION: 71M w/ HTN and DM2; 3/3/22 p/w obstructive uropathy    (1)CKD - CKD2 with non-nephrotic range proteinuria - likely early diabetic nephropathy  (2)GINA - prerenally mediated - improved s/p berry placement  (3)Lytes - acceptable  (4) - undergoing TOV      RECOMMEND:  (1)No renal objection to restarting ARB  (2)TOV as planned  (3)D/C planning per primary team; can f/u at my office on a prn basis            Harjit Sheldon MD  [facility]  Office: [phone]  Cell: [phone]

## 2022-03-07 NOTE — PROGRESS NOTE ADULT - ASSESSMENT
Assessment  DMT2: 71y Male with recently dx DM T2 with hyperglycemia, A1C 11.5%, was recently diagnosed and DC on basal insulin + Metformin,  readmitted, now on low-dose basal insulin and coverage, blood sugars trending within overall acceptable range, no hypoglycemias, NAD, eating partial meals, DC planning.  ?Thyroid Nodule: no known history, per imaging "1.2 cm low-attenuation focus in the left thyroid lobe", euthyroid.  Urinary Retention: on medications, stable, monitored, FU urology.  HLD: On statin.        Nini Carpenter MD  Cell: [phone]  Office: [phone]     Assessment  DMT2: 71y Male with recently dx DM T2 with hyperglycemia, A1C 11.5%, was recently diagnosed and DC on basal insulin + Metformin,  readmitted, now on low-dose basal insulin and coverage, blood sugars trending within  overall acceptable range, no hypoglycemias, NAD, eating partial meals, DC planning.  ?Thyroid Nodule: no known history, per imaging "1.2 cm low-attenuation focus in the left thyroid lobe", euthyroid.  Urinary Retention: on medications, stable, monitored, FU urology.  HLD: On statin.        Nini Carpenter MD  Cell: [phone]  Office: [phone]

## 2022-03-07 NOTE — CHART NOTE - NSCHARTNOTEFT_GEN_A_CORE
Pt with retention per urology. Dr Goldberg was to discharge pt with Ramírez catheter and to F/U with urology. Pt is adamantly refusing, insisting on leaving without Ramírez.  Pt is aware of risks of retention, etc. Pt will F/U with urology as outpatient. Dr Jordan aware.

## 2022-03-07 NOTE — PROGRESS NOTE ADULT - ASSESSMENT
72 yo M   recnetly dx  dm.  was   seen by  sher,  now returns   No abdominal pain, no fever, no chest pain, no sob no dysuria.  COVID vaccinated no sick contacts.      pt  was   d/.c  by  myself on 2/24/22,  and  was  seen by  danya guevara  and  sher littlejohn,  who  will be  f/p  on pt          c/o   with loss of appetite for the past 2 weeks, in associated with impaired small/impaired taste.   He admits to intermittent vomiting, but denies nausea;    Noted on admission to have GINA with a creatinine of 1.4    CT I+ in the ER to have bilateral hydro to level of bladder, consistent with bladder outlet obstruction.    Ramírez placed in ER with sudden 600cc urine output.  seen by urology  on  flomax  DM,  follow  fs   on asa, lipitor   rx plan per sher littlejohn. on  dvt ppx  tov   is plan in  am/  family concerne d about his po intake/  gi eval cindy fairchild  pt states he only wants  chinese  food  defer  home  rx  for  dm, to  sher/ latasha pt  may not  need  home  insulin/  check  bladder  scan  this  am   start   d/c  planning           72 yo M   recnetly dx  dm.  was   seen by  sher,  now returns   No abdominal pain, no fever, no chest pain, no sob no dysuria.  COVID vaccinated no sick contacts.      pt  was   d/.c  by  myself on 2/24/22,  and  was  seen by  danya guevara  and  sher littlejohn,  who  will be  f/p  on pt          c/o   with loss of appetite for the past 2 weeks, in associated with impaired small/impaired taste.   He admits to intermittent vomiting, but denies nausea;    Noted on admission to have GINA with a creatinine of 1.4    CT I+ in the ER to have bilateral hydro to level of bladder, consistent with bladder outlet obstruction.    Ramírez placed in ER with sudden 600cc urine output.  seen by urology  on  flomax  DM,  follow  fs   on asa, lipitor   rx plan per sher littlejohn. on  dvt ppx  tov   is plan in  am/  family concerne d about his po intake/  gi eval cindy fairchild  pt states he only wants  chinese  food  defer  home  rx  for  dm, to  sher/ , latasha, pt  may not  need  home  insulin/  check  bladder  scan  this  am   per  RN, earlier  pvr wa s 400. then pt voided  /  f/p on bladder scan and  then will  juan david  for   d/c

## 2022-03-07 NOTE — DISCHARGE NOTE PROVIDER - NSDCCPCAREPLAN_GEN_ALL_CORE_FT
PRINCIPAL DISCHARGE DIAGNOSIS  Diagnosis: Adult failure to thrive  Assessment and Plan of Treatment: Continue Marinol twice daily to help stimulate your appetite. Do not drive or operate machinery as it may make you drowsy. It is a controlled medication so do not overuse, share with others or leave unsecured. Eat your regular diet and follow a nutritious diet. Follow up with your Primary MD Dr Edwardo Eastman within 1 week. Call his office for an appointment.      SECONDARY DISCHARGE DIAGNOSES  Diagnosis: BPH without urinary obstruction  Assessment and Plan of Treatment: Continue Flomax medication for your prostate. Keep the urinary catheter in place to leg bag and do not open the tube system as this can cause an infection if the system is contaminated. Open the valve on the bottom of the  drainage bag to empty the urine. Do not touch the tip to any surfaces. Follow up with Dr Goldberg within 1-2 weeks for continued management of the urinary catheter and of the urinary retention. Call his office for appointment.    Diagnosis: DM (diabetes mellitus)  Assessment and Plan of Treatment: Continue your insulin as prescribed. Your insulin dose has been reduced because you are eating less. Monitor your blood sugars and keep a record to bring to your doctors appointments. Follow up with Dr Carpenter for continued diabetes management. Call office for appointment.

## 2022-03-08 ENCOUNTER — EMERGENCY (EMERGENCY)
Facility: HOSPITAL | Age: 72
LOS: 1 days | Discharge: HOME | End: 2022-03-08
Attending: EMERGENCY MEDICINE
Payer: COMMERCIAL

## 2022-03-08 VITALS
HEIGHT: 67 IN | SYSTOLIC BLOOD PRESSURE: 117 MMHG | WEIGHT: 175.05 LBS | RESPIRATION RATE: 20 BRPM | TEMPERATURE: 98 F | OXYGEN SATURATION: 99 % | HEART RATE: 85 BPM | DIASTOLIC BLOOD PRESSURE: 62 MMHG

## 2022-03-08 VITALS
SYSTOLIC BLOOD PRESSURE: 128 MMHG | HEART RATE: 80 BPM | DIASTOLIC BLOOD PRESSURE: 92 MMHG | RESPIRATION RATE: 18 BRPM | TEMPERATURE: 98 F | OXYGEN SATURATION: 99 %

## 2022-03-08 LAB
APPEARANCE UR: ABNORMAL
BACTERIA # UR AUTO: ABNORMAL
BILIRUB UR-MCNC: NEGATIVE — SIGNIFICANT CHANGE UP
COLOR SPEC: ABNORMAL
DIFF PNL FLD: ABNORMAL
EPI CELLS # UR: 1 — SIGNIFICANT CHANGE UP
GLUCOSE UR QL: ABNORMAL
HYALINE CASTS # UR AUTO: 0 /LPF — SIGNIFICANT CHANGE UP (ref 0–7)
KETONES UR-MCNC: SIGNIFICANT CHANGE UP
LEUKOCYTE ESTERASE UR-ACNC: ABNORMAL
NITRITE UR-MCNC: NEGATIVE — SIGNIFICANT CHANGE UP
PH UR: 6 — SIGNIFICANT CHANGE UP (ref 5–8)
PROT UR-MCNC: ABNORMAL
RBC CASTS # UR COMP ASSIST: >50 /HPF — HIGH (ref 0–4)
SP GR SPEC: 1.02 — SIGNIFICANT CHANGE UP (ref 1.01–1.02)
UROBILINOGEN FLD QL: ABNORMAL
WBC UR QL: >50 /HPF — HIGH (ref 0–5)

## 2022-03-08 PROCEDURE — 87086 URINE CULTURE/COLONY COUNT: CPT

## 2022-03-08 PROCEDURE — 99283 EMERGENCY DEPT VISIT LOW MDM: CPT | Mod: GC

## 2022-03-08 PROCEDURE — 99283 EMERGENCY DEPT VISIT LOW MDM: CPT

## 2022-03-08 PROCEDURE — 81001 URINALYSIS AUTO W/SCOPE: CPT

## 2022-03-08 RX ORDER — CEPHALEXIN 500 MG
1 CAPSULE ORAL
Qty: 40 | Refills: 0
Start: 2022-03-08 | End: 2022-03-17

## 2022-03-08 RX ORDER — CEPHALEXIN 500 MG
500 CAPSULE ORAL ONCE
Refills: 0 | Status: COMPLETED | OUTPATIENT
Start: 2022-03-08 | End: 2022-03-08

## 2022-03-08 RX ADMIN — Medication 500 MILLIGRAM(S): at 04:23

## 2022-03-08 NOTE — ED ADULT NURSE REASSESSMENT NOTE - NS ED NURSE REASSESS COMMENT FT1
Pt's urinary berry catheter flushed. Small blood clots noted, MD Nava aware. New leg bag placed. Will continue to monitor.

## 2022-03-08 NOTE — ED ADULT NURSE REASSESSMENT NOTE - NS ED NURSE REASSESS COMMENT FT1
2 RN's present at bedside for sterile confirmation to replace berry catheter. Pt able to tolerate well, bright red hematuria draining from berry. No clots present at this time.

## 2022-03-08 NOTE — ED PROVIDER NOTE - NS ED ROS FT
Constitutional:  (-) fever, (-) chills, (-) lethargy  Eyes:  (-) eye pain (-) visual changes  ENMT: (-) nasal discharge, (-) sore throat. (-) neck pain or stiffness  Cardiac: (-) chest pain (-) palpitations  Respiratory:  (-) cough (-) respiratory distress.   GI:  (-) nausea (-) vomiting (-) diarrhea (-) abdominal pain.  :  (-) dysuria (-) frequency (-) burning +hematuria  MS:  (-) back pain (-) joint pain.  Neuro:  (-) headache (-) numbness (-) tingling (-) focal weakness  Skin:  (-) rash  Except as documented in the HPI,  all other systems are negative

## 2022-03-08 NOTE — ED PROVIDER NOTE - PROGRESS NOTE DETAILS
Chaitanya Nava, DO PGY-2: Ramírez flushed w/ 200cc urine, urine now very light pink Chaitanya Nava, DO PGY-2: Pt found to have UTI, will treat, dc home Chaitanya Nava, DO PGY-2: berry replaced, bloody urine output, no clots in berry or leg bag

## 2022-03-08 NOTE — ED PROVIDER NOTE - CLINICAL SUMMARY MEDICAL DECISION MAKING FREE TEXT BOX
Hematuria after discharge, likely 2/2 bladder trauma, also considering UTI, ua, uc, flush berry, likely DC home

## 2022-03-08 NOTE — ED ADULT NURSE NOTE - CAS ELECT INFOMATION PROVIDED
Pt instructed to follow up with primary care outpatient, take abx as presecribed, pt verbalizes understanding/DC instructions

## 2022-03-08 NOTE — ED PROVIDER NOTE - ATTENDING CONTRIBUTION TO CARE
attending Etelvina: 71yM with recent admission for new onset DM and urinary retention requiring berry placement now presents with gross hematuria from berry catheter. Denies fevers, abdominal pain. Berry draining normally. Will obtain UA/Ucx, if signs of infection will replace berry catheter.

## 2022-03-08 NOTE — ED PROVIDER NOTE - NSFOLLOWUPINSTRUCTIONS_ED_ALL_ED_FT
You were seen in the Emergency Department for blood in your urine.    Follow up with your primary care provider within 3-5 days.     If you have worsening symptoms, fever, chills, nausea, vomiting, new or worsening pain, or if you have any new symptoms return to the Emergency Department.

## 2022-03-08 NOTE — ED PROVIDER NOTE - PATIENT PORTAL LINK FT
You can access the FollowMyHealth Patient Portal offered by [facility] by registering at the following website: [email or web address]. By joining [facility]’s FollowMyHealth portal, you will also be able to view your health information using other applications (apps) compatible with our system.

## 2022-03-08 NOTE — ED PROVIDER NOTE - PHYSICAL EXAMINATION
CONSTITUTIONAL: well-appearing, in NAD  SKIN: Warm dry, normal skin turgor  ENT: normal pharynx with no erythema or exudates  NECK: Supple; non tender. Full ROM.  CARD: RRR, no murmurs.  RESP: clear to ausculation b/l. No crackles or wheezing.  ABD: soft, non-tender, non-distended, no rebound or guarding.  : Chaperone FARIDA Arita- no penis tenderness, no blood at urinary meatus, berry draining blood tinged urine  MSK: no pedal edema, no calf tenderness  PSYCH: Cooperative, appropriate.

## 2022-03-08 NOTE — ED PROVIDER NOTE - OBJECTIVE STATEMENT
M pmh recently diagnosed DM discharged from hospital several hours ago w./ berry placed for rentention presents ot ED after noticing that the urine in the berry bag turned bloody, pt denies pain, no fevers/chills/n/v or any other symptoms, pt states berry is draining urine well.

## 2022-03-08 NOTE — ED ADULT NURSE NOTE - OBJECTIVE STATEMENT
Pt is a 70 y/o male presenting to the ED c/o hematuria. Pt states he was discharged from [facility] last night with a berry catheter for urinary retention, when the patient returned home he noticed new onset hematuria and white discharge at the catheter insertion point. Upon assessment, hematuria noted to be draining from berry, no clots present in bag, pt denies suprapubic & back pain, fever, chills, n/v. PMH DM. Pt is A&Ox3, follows commands, sensory/motor intact, VSS. Pt denies chest pain, SOB, abdominal pain, changes in bowel habits, headache, dizziness, numbness/tingling.

## 2022-03-09 ENCOUNTER — INPATIENT (INPATIENT)
Facility: HOSPITAL | Age: 72
LOS: 3 days | Discharge: HOME HEALTH | DRG: 684 | End: 2022-03-13
Attending: INTERNAL MEDICINE | Admitting: INTERNAL MEDICINE
Payer: COMMERCIAL

## 2022-03-09 VITALS
OXYGEN SATURATION: 99 % | TEMPERATURE: 97 F | SYSTOLIC BLOOD PRESSURE: 86 MMHG | RESPIRATION RATE: 18 BRPM | HEART RATE: 81 BPM | DIASTOLIC BLOOD PRESSURE: 54 MMHG | WEIGHT: 132.28 LBS | HEIGHT: 67 IN

## 2022-03-09 DIAGNOSIS — N17.9 ACUTE KIDNEY FAILURE, UNSPECIFIED: ICD-10-CM

## 2022-03-09 PROBLEM — E11.9 TYPE 2 DIABETES MELLITUS WITHOUT COMPLICATIONS: Chronic | Status: ACTIVE | Noted: 2022-03-08

## 2022-03-09 LAB
ALBUMIN SERPL ELPH-MCNC: 3.9 G/DL — SIGNIFICANT CHANGE UP (ref 3.3–5)
ALP SERPL-CCNC: 66 U/L — SIGNIFICANT CHANGE UP (ref 40–120)
ALT FLD-CCNC: 14 U/L — SIGNIFICANT CHANGE UP (ref 10–45)
ANION GAP SERPL CALC-SCNC: 16 MMOL/L — SIGNIFICANT CHANGE UP (ref 5–17)
APPEARANCE UR: ABNORMAL
APTT BLD: 36.7 SEC — HIGH (ref 27.5–35.5)
AST SERPL-CCNC: 20 U/L — SIGNIFICANT CHANGE UP (ref 10–40)
BACTERIA # UR AUTO: NEGATIVE — SIGNIFICANT CHANGE UP
BASE EXCESS BLDV CALC-SCNC: 0.1 MMOL/L — SIGNIFICANT CHANGE UP (ref -2–2)
BASE EXCESS BLDV CALC-SCNC: 2.9 MMOL/L — HIGH (ref -2–2)
BASOPHILS # BLD AUTO: 0.04 K/UL — SIGNIFICANT CHANGE UP (ref 0–0.2)
BASOPHILS NFR BLD AUTO: 0.4 % — SIGNIFICANT CHANGE UP (ref 0–2)
BILIRUB SERPL-MCNC: 1 MG/DL — SIGNIFICANT CHANGE UP (ref 0.2–1.2)
BILIRUB UR-MCNC: NEGATIVE — SIGNIFICANT CHANGE UP
BUN SERPL-MCNC: 38 MG/DL — HIGH (ref 7–23)
CA-I SERPL-SCNC: 1.1 MMOL/L — LOW (ref 1.15–1.33)
CA-I SERPL-SCNC: 1.14 MMOL/L — LOW (ref 1.15–1.33)
CALCIUM SERPL-MCNC: 9 MG/DL — SIGNIFICANT CHANGE UP (ref 8.4–10.5)
CHLORIDE BLDV-SCNC: 93 MMOL/L — LOW (ref 96–108)
CHLORIDE BLDV-SCNC: 98 MMOL/L — SIGNIFICANT CHANGE UP (ref 96–108)
CHLORIDE SERPL-SCNC: 95 MMOL/L — LOW (ref 96–108)
CO2 BLDV-SCNC: 27 MMOL/L — HIGH (ref 22–26)
CO2 BLDV-SCNC: 29 MMOL/L — HIGH (ref 22–26)
CO2 SERPL-SCNC: 21 MMOL/L — LOW (ref 22–31)
COLOR SPEC: ABNORMAL
CREAT SERPL-MCNC: 2.93 MG/DL — HIGH (ref 0.5–1.3)
CULTURE RESULTS: NO GROWTH — SIGNIFICANT CHANGE UP
DIFF PNL FLD: ABNORMAL
EGFR: 22 ML/MIN/1.73M2 — LOW
EOSINOPHIL # BLD AUTO: 0.15 K/UL — SIGNIFICANT CHANGE UP (ref 0–0.5)
EOSINOPHIL NFR BLD AUTO: 1.4 % — SIGNIFICANT CHANGE UP (ref 0–6)
EPI CELLS # UR: 6 /HPF — HIGH
GAS PNL BLDV: 124 MMOL/L — LOW (ref 136–145)
GAS PNL BLDV: 129 MMOL/L — LOW (ref 136–145)
GAS PNL BLDV: SIGNIFICANT CHANGE UP
GLUCOSE BLDC GLUCOMTR-MCNC: 102 MG/DL — HIGH (ref 70–99)
GLUCOSE BLDC GLUCOMTR-MCNC: 149 MG/DL — HIGH (ref 70–99)
GLUCOSE BLDC GLUCOMTR-MCNC: 76 MG/DL — SIGNIFICANT CHANGE UP (ref 70–99)
GLUCOSE BLDV-MCNC: 123 MG/DL — HIGH (ref 70–99)
GLUCOSE BLDV-MCNC: 90 MG/DL — SIGNIFICANT CHANGE UP (ref 70–99)
GLUCOSE SERPL-MCNC: 127 MG/DL — HIGH (ref 70–99)
GLUCOSE UR QL: NEGATIVE — SIGNIFICANT CHANGE UP
HCO3 BLDV-SCNC: 26 MMOL/L — SIGNIFICANT CHANGE UP (ref 22–29)
HCO3 BLDV-SCNC: 28 MMOL/L — SIGNIFICANT CHANGE UP (ref 22–29)
HCT VFR BLD CALC: 48.3 % — SIGNIFICANT CHANGE UP (ref 39–50)
HCT VFR BLDA CALC: 45 % — SIGNIFICANT CHANGE UP (ref 39–51)
HCT VFR BLDA CALC: 55 % — HIGH (ref 39–51)
HGB BLD CALC-MCNC: 14.9 G/DL — SIGNIFICANT CHANGE UP (ref 12.6–17.4)
HGB BLD CALC-MCNC: 18.2 G/DL — HIGH (ref 12.6–17.4)
HGB BLD-MCNC: 16.2 G/DL — SIGNIFICANT CHANGE UP (ref 13–17)
HYALINE CASTS # UR AUTO: 0 /LPF — SIGNIFICANT CHANGE UP (ref 0–7)
IMM GRANULOCYTES NFR BLD AUTO: 0.4 % — SIGNIFICANT CHANGE UP (ref 0–1.5)
INR BLD: 0.99 RATIO — SIGNIFICANT CHANGE UP (ref 0.88–1.16)
KETONES UR-MCNC: NEGATIVE — SIGNIFICANT CHANGE UP
LACTATE BLDV-MCNC: 2 MMOL/L — SIGNIFICANT CHANGE UP (ref 0.7–2)
LACTATE BLDV-MCNC: 2.3 MMOL/L — HIGH (ref 0.7–2)
LEUKOCYTE ESTERASE UR-ACNC: ABNORMAL
LYMPHOCYTES # BLD AUTO: 19.3 % — SIGNIFICANT CHANGE UP (ref 13–44)
LYMPHOCYTES # BLD AUTO: 2.06 K/UL — SIGNIFICANT CHANGE UP (ref 1–3.3)
MCHC RBC-ENTMCNC: 28.9 PG — SIGNIFICANT CHANGE UP (ref 27–34)
MCHC RBC-ENTMCNC: 33.5 GM/DL — SIGNIFICANT CHANGE UP (ref 32–36)
MCV RBC AUTO: 86.1 FL — SIGNIFICANT CHANGE UP (ref 80–100)
MONOCYTES # BLD AUTO: 0.73 K/UL — SIGNIFICANT CHANGE UP (ref 0–0.9)
MONOCYTES NFR BLD AUTO: 6.8 % — SIGNIFICANT CHANGE UP (ref 2–14)
NEUTROPHILS # BLD AUTO: 7.66 K/UL — HIGH (ref 1.8–7.4)
NEUTROPHILS NFR BLD AUTO: 71.7 % — SIGNIFICANT CHANGE UP (ref 43–77)
NITRITE UR-MCNC: NEGATIVE — SIGNIFICANT CHANGE UP
NRBC # BLD: 0 /100 WBCS — SIGNIFICANT CHANGE UP (ref 0–0)
PCO2 BLDV: 43 MMHG — SIGNIFICANT CHANGE UP (ref 42–55)
PCO2 BLDV: 45 MMHG — SIGNIFICANT CHANGE UP (ref 42–55)
PH BLDV: 7.37 — SIGNIFICANT CHANGE UP (ref 7.32–7.43)
PH BLDV: 7.42 — SIGNIFICANT CHANGE UP (ref 7.32–7.43)
PH UR: 6 — SIGNIFICANT CHANGE UP (ref 5–8)
PLATELET # BLD AUTO: 161 K/UL — SIGNIFICANT CHANGE UP (ref 150–400)
PO2 BLDV: 24 MMHG — LOW (ref 25–45)
PO2 BLDV: 39 MMHG — SIGNIFICANT CHANGE UP (ref 25–45)
POTASSIUM BLDV-SCNC: 3.8 MMOL/L — SIGNIFICANT CHANGE UP (ref 3.5–5.1)
POTASSIUM BLDV-SCNC: 5.7 MMOL/L — HIGH (ref 3.5–5.1)
POTASSIUM SERPL-MCNC: 3.9 MMOL/L — SIGNIFICANT CHANGE UP (ref 3.5–5.3)
POTASSIUM SERPL-SCNC: 3.9 MMOL/L — SIGNIFICANT CHANGE UP (ref 3.5–5.3)
PROT SERPL-MCNC: 6.9 G/DL — SIGNIFICANT CHANGE UP (ref 6–8.3)
PROT UR-MCNC: ABNORMAL
PROTHROM AB SERPL-ACNC: 11.5 SEC — SIGNIFICANT CHANGE UP (ref 10.5–13.4)
RBC # BLD: 5.61 M/UL — SIGNIFICANT CHANGE UP (ref 4.2–5.8)
RBC # FLD: 13.1 % — SIGNIFICANT CHANGE UP (ref 10.3–14.5)
RBC CASTS # UR COMP ASSIST: 135 /HPF — HIGH (ref 0–4)
SAO2 % BLDV: 36.1 % — LOW (ref 67–88)
SAO2 % BLDV: 69.7 % — SIGNIFICANT CHANGE UP (ref 67–88)
SARS-COV-2 RNA SPEC QL NAA+PROBE: SIGNIFICANT CHANGE UP
SODIUM SERPL-SCNC: 132 MMOL/L — LOW (ref 135–145)
SP GR SPEC: 1.02 — SIGNIFICANT CHANGE UP (ref 1.01–1.02)
SPECIMEN SOURCE: SIGNIFICANT CHANGE UP
UROBILINOGEN FLD QL: ABNORMAL
WBC # BLD: 10.68 K/UL — HIGH (ref 3.8–10.5)
WBC # FLD AUTO: 10.68 K/UL — HIGH (ref 3.8–10.5)
WBC UR QL: 69 /HPF — HIGH (ref 0–5)

## 2022-03-09 PROCEDURE — 71046 X-RAY EXAM CHEST 2 VIEWS: CPT | Mod: 26

## 2022-03-09 PROCEDURE — 71260 CT THORAX DX C+: CPT | Mod: MA

## 2022-03-09 PROCEDURE — 84100 ASSAY OF PHOSPHORUS: CPT

## 2022-03-09 PROCEDURE — 85027 COMPLETE CBC AUTOMATED: CPT

## 2022-03-09 PROCEDURE — 99285 EMERGENCY DEPT VISIT HI MDM: CPT | Mod: 25

## 2022-03-09 PROCEDURE — 84439 ASSAY OF FREE THYROXINE: CPT

## 2022-03-09 PROCEDURE — 82962 GLUCOSE BLOOD TEST: CPT

## 2022-03-09 PROCEDURE — 93010 ELECTROCARDIOGRAM REPORT: CPT

## 2022-03-09 PROCEDURE — U0005: CPT

## 2022-03-09 PROCEDURE — 84443 ASSAY THYROID STIM HORMONE: CPT

## 2022-03-09 PROCEDURE — 81001 URINALYSIS AUTO W/SCOPE: CPT

## 2022-03-09 PROCEDURE — 80053 COMPREHEN METABOLIC PANEL: CPT

## 2022-03-09 PROCEDURE — 84156 ASSAY OF PROTEIN URINE: CPT

## 2022-03-09 PROCEDURE — 83735 ASSAY OF MAGNESIUM: CPT

## 2022-03-09 PROCEDURE — 80048 BASIC METABOLIC PNL TOTAL CA: CPT

## 2022-03-09 PROCEDURE — 70450 CT HEAD/BRAIN W/O DYE: CPT | Mod: MA

## 2022-03-09 PROCEDURE — 93005 ELECTROCARDIOGRAM TRACING: CPT

## 2022-03-09 PROCEDURE — 74177 CT ABD & PELVIS W/CONTRAST: CPT | Mod: MA

## 2022-03-09 PROCEDURE — 82570 ASSAY OF URINE CREATININE: CPT

## 2022-03-09 PROCEDURE — 76770 US EXAM ABDO BACK WALL COMP: CPT | Mod: 26

## 2022-03-09 PROCEDURE — 36415 COLL VENOUS BLD VENIPUNCTURE: CPT

## 2022-03-09 PROCEDURE — 76775 US EXAM ABDO BACK WALL LIM: CPT

## 2022-03-09 PROCEDURE — 85025 COMPLETE CBC W/AUTO DIFF WBC: CPT

## 2022-03-09 PROCEDURE — U0003: CPT

## 2022-03-09 PROCEDURE — 99291 CRITICAL CARE FIRST HOUR: CPT | Mod: 25

## 2022-03-09 PROCEDURE — 87086 URINE CULTURE/COLONY COUNT: CPT

## 2022-03-09 RX ORDER — DEXTROSE 50 % IN WATER 50 %
12.5 SYRINGE (ML) INTRAVENOUS ONCE
Refills: 0 | Status: DISCONTINUED | OUTPATIENT
Start: 2022-03-09 | End: 2022-03-13

## 2022-03-09 RX ORDER — DEXTROSE 50 % IN WATER 50 %
25 SYRINGE (ML) INTRAVENOUS ONCE
Refills: 0 | Status: DISCONTINUED | OUTPATIENT
Start: 2022-03-09 | End: 2022-03-13

## 2022-03-09 RX ORDER — GLUCAGON INJECTION, SOLUTION 0.5 MG/.1ML
1 INJECTION, SOLUTION SUBCUTANEOUS ONCE
Refills: 0 | Status: DISCONTINUED | OUTPATIENT
Start: 2022-03-09 | End: 2022-03-13

## 2022-03-09 RX ORDER — SODIUM CHLORIDE 9 MG/ML
1000 INJECTION INTRAMUSCULAR; INTRAVENOUS; SUBCUTANEOUS
Refills: 0 | Status: DISCONTINUED | OUTPATIENT
Start: 2022-03-09 | End: 2022-03-11

## 2022-03-09 RX ORDER — CEFTRIAXONE 500 MG/1
1000 INJECTION, POWDER, FOR SOLUTION INTRAMUSCULAR; INTRAVENOUS EVERY 24 HOURS
Refills: 0 | Status: DISCONTINUED | OUTPATIENT
Start: 2022-03-09 | End: 2022-03-11

## 2022-03-09 RX ORDER — SODIUM CHLORIDE 9 MG/ML
500 INJECTION INTRAMUSCULAR; INTRAVENOUS; SUBCUTANEOUS ONCE
Refills: 0 | Status: COMPLETED | OUTPATIENT
Start: 2022-03-09 | End: 2022-03-09

## 2022-03-09 RX ORDER — SODIUM CHLORIDE 9 MG/ML
1000 INJECTION, SOLUTION INTRAVENOUS
Refills: 0 | Status: DISCONTINUED | OUTPATIENT
Start: 2022-03-09 | End: 2022-03-13

## 2022-03-09 RX ORDER — CEFEPIME 1 G/1
1000 INJECTION, POWDER, FOR SOLUTION INTRAMUSCULAR; INTRAVENOUS ONCE
Refills: 0 | Status: COMPLETED | OUTPATIENT
Start: 2022-03-09 | End: 2022-03-09

## 2022-03-09 RX ORDER — HEPARIN SODIUM 5000 [USP'U]/ML
5000 INJECTION INTRAVENOUS; SUBCUTANEOUS EVERY 12 HOURS
Refills: 0 | Status: DISCONTINUED | OUTPATIENT
Start: 2022-03-09 | End: 2022-03-13

## 2022-03-09 RX ORDER — INSULIN LISPRO 100/ML
VIAL (ML) SUBCUTANEOUS
Refills: 0 | Status: DISCONTINUED | OUTPATIENT
Start: 2022-03-09 | End: 2022-03-13

## 2022-03-09 RX ORDER — SODIUM CHLORIDE 9 MG/ML
1000 INJECTION, SOLUTION INTRAVENOUS ONCE
Refills: 0 | Status: COMPLETED | OUTPATIENT
Start: 2022-03-09 | End: 2022-03-09

## 2022-03-09 RX ORDER — DEXTROSE 50 % IN WATER 50 %
15 SYRINGE (ML) INTRAVENOUS ONCE
Refills: 0 | Status: DISCONTINUED | OUTPATIENT
Start: 2022-03-09 | End: 2022-03-13

## 2022-03-09 RX ADMIN — SODIUM CHLORIDE 80 MILLILITER(S): 9 INJECTION INTRAMUSCULAR; INTRAVENOUS; SUBCUTANEOUS at 18:33

## 2022-03-09 RX ADMIN — SODIUM CHLORIDE 1000 MILLILITER(S): 9 INJECTION, SOLUTION INTRAVENOUS at 14:30

## 2022-03-09 RX ADMIN — CEFTRIAXONE 100 MILLIGRAM(S): 500 INJECTION, POWDER, FOR SOLUTION INTRAMUSCULAR; INTRAVENOUS at 20:16

## 2022-03-09 RX ADMIN — SODIUM CHLORIDE 1000 MILLILITER(S): 9 INJECTION INTRAMUSCULAR; INTRAVENOUS; SUBCUTANEOUS at 17:04

## 2022-03-09 RX ADMIN — CEFEPIME 100 MILLIGRAM(S): 1 INJECTION, POWDER, FOR SOLUTION INTRAMUSCULAR; INTRAVENOUS at 14:12

## 2022-03-09 RX ADMIN — HEPARIN SODIUM 5000 UNIT(S): 5000 INJECTION INTRAVENOUS; SUBCUTANEOUS at 18:26

## 2022-03-09 NOTE — H&P ADULT - NSHPPHYSICALEXAM_GEN_ALL_CORE
PHYSICAL EXAMINATION:  Vital Signs Last 24 Hrs  T(C): 36.5 (09 Mar 2022 14:39), Max: 36.6 (09 Mar 2022 14:00)  T(F): 97.7 (09 Mar 2022 14:39), Max: 97.8 (09 Mar 2022 14:00)  HR: 69 (09 Mar 2022 15:20) (69 - 84)  BP: 101/59 (09 Mar 2022 15:20) (86/54 - 119/66)  BP(mean): 69 (09 Mar 2022 15:20) (67 - 69)  RR: 17 (09 Mar 2022 15:20) (14 - 18)  SpO2: 95% (09 Mar 2022 15:20) (95% - 100%)  CAPILLARY BLOOD GLUCOSE      POCT Blood Glucose.: 113 mg/dL (09 Mar 2022 13:06)        GENERAL: NAD, well-groomed,  HEAD:  atraumatic, normocephalic  EYES: sclera anicteric  ENMT: mucous membranes moist  NECK: supple, No JVD  CHEST/LUNG: clear to auscultation bilaterally;    no      rales   ,   no rhonchi,   HEART: normal S1, S2  ABDOMEN: BS+, soft, ND, NT   EXTREMITIES:    no    edema    b/l LEs  NEURO: awake, ,     moves all extremities  SKIN: no     rash  berry

## 2022-03-09 NOTE — H&P ADULT - NSHPLABSRESULTS_GEN_ALL_CORE
LABS:                        16.2   10.68 )-----------( 161      ( 09 Mar 2022 13:29 )             48.3         132<L>  |  95<L>  |  38<H>  ----------------------------<  127<H>  3.9   |  21<L>  |  2.93<H>    Ca    9.0      09 Mar 2022 13:29    TPro  6.9  /  Alb  3.9  /  TBili  1.0  /  DBili  x   /  AST  20  /  ALT  14  /  AlkPhos  66      PT/INR - ( 09 Mar 2022 13:29 )   PT: 11.5 sec;   INR: 0.99 ratio         PTT - ( 09 Mar 2022 13:29 )  PTT:36.7 sec      Urinalysis Basic - ( 09 Mar 2022 13:29 )    Color: Light Orange / Appearance: Turbid / S.021 / pH: x  Gluc: x / Ketone: Negative  / Bili: Negative / Urobili: 3 mg/dL   Blood: x / Protein: 100 mg/dL / Nitrite: Negative   Leuk Esterase: Large / RBC: 135 /hpf / WBC 69 /HPF   Sq Epi: x / Non Sq Epi: 6 /hpf / Bacteria: Negative           @ 13:28  5.7  24      Thyroid Stimulating Hormone, Serum: 0.94 uIU/mL ( @ 06:26)

## 2022-03-09 NOTE — CONSULT NOTE ADULT - SUBJECTIVE AND OBJECTIVE BOX
CHIEF COMPLAINT:Patient is a 71y old  Male who presents with a chief complaint of vickie (09 Mar 2022 15:19)      HPI:  71yr M h/o   DM ,  bph , had a  home foely, w recent ED visit for hematuria  sent today from office for persistent hypotension.   denies  fever chills/  cp, sob, abd pain, no diarrhea, no vomiting.  pt denies of any chest pain/ sob.  while in ER pt with decrease bp started on ivf, pt was on losartan and was dcd with no bp meds. pt with echo with  normal EF and was scheduled for stress test.       PAST MEDICAL & SURGICAL HISTORY:  Diabetes        MEDICATIONS  (STANDING):  dextrose 40% Gel 15 Gram(s) Oral once  dextrose 5%. 1000 milliLiter(s) (50 mL/Hr) IV Continuous <Continuous>  dextrose 5%. 1000 milliLiter(s) (100 mL/Hr) IV Continuous <Continuous>  dextrose 50% Injectable 25 Gram(s) IV Push once  dextrose 50% Injectable 12.5 Gram(s) IV Push once  dextrose 50% Injectable 25 Gram(s) IV Push once  glucagon  Injectable 1 milliGRAM(s) IntraMuscular once  heparin   Injectable 5000 Unit(s) SubCutaneous every 12 hours  insulin lispro (ADMELOG) corrective regimen sliding scale   SubCutaneous three times a day before meals  sodium chloride 0.9%. 1000 milliLiter(s) (80 mL/Hr) IV Continuous <Continuous>    MEDICATIONS  (PRN):      FAMILY HISTORY:      SOCIAL HISTORY:    [ x] Non-smoker  [ ] Smoker  [ ] Alcohol    Allergies    No Known Allergies    Intolerances    	    REVIEW OF SYSTEMS:  CONSTITUTIONAL: No fever, weight loss, or fatigue  EYES: No eye pain, visual disturbances, or discharge  ENT:  No difficulty hearing, tinnitus, vertigo; No sinus or throat pain  NECK: No pain or stiffness  RESPIRATORY: No cough, wheezing, chills or hemoptysis; No Shortness of Breath  CARDIOVASCULAR: No chest pain, palpitations, passing out, dizziness, or leg swelling  GASTROINTESTINAL: No abdominal or epigastric pain. No nausea, vomiting, or hematemesis; No diarrhea or constipation. No melena or hematochezia.  GENITOURINARY: No dysuria, frequency, hematuria, or incontinence  NEUROLOGICAL: No headaches, memory loss, loss of strength, numbness, or tremors  SKIN: No itching, burning, rashes, or lesions   LYMPH Nodes: No enlarged glands  ENDOCRINE: No heat or cold intolerance; No hair loss  MUSCULOSKELETAL: No joint pain or swelling; No muscle, back, or extremity pain  PSYCHIATRIC: No depression, anxiety, mood swings, or difficulty sleeping  HEME/LYMPH: No easy bruising, or bleeding gums  ALLERGY AND IMMUNOLOGIC: No hives or eczema	    [ ] All others negative	  [ ] Unable to obtain    PHYSICAL EXAM:  T(C): 36.5 (03-09-22 @ 18:01), Max: 36.6 (03-09-22 @ 14:00)  HR: 70 (03-09-22 @ 18:01) (69 - 84)  BP: 105/61 (03-09-22 @ 18:01) (86/54 - 119/66)  RR: 19 (03-09-22 @ 18:01) (14 - 19)  SpO2: 100% (03-09-22 @ 18:01) (95% - 100%)  Wt(kg): --  I&O's Summary      Appearance: Normal	  HEENT:   Normal oral mucosa, PERRL, EOMI	  Lymphatic: No lymphadenopathy  Cardiovascular: Normal S1 S2, No JVD, + murmurs, No edema  Respiratory: Lungs clear to auscultation	  Psychiatry: A & O x 3, Mood & affect appropriate  Gastrointestinal:  Soft, Non-tender, + BS	  Skin: No rashes, No ecchymoses, No cyanosis	  Neurologic: Non-focal  Extremities: Normal range of motion, No clubbing, cyanosis or edema  Vascular: Peripheral pulses palpable 2+ bilaterally    TELEMETRY: 	    ECG:  	  RADIOLOGY:  OTHER: 	  	  LABS:	 	    CARDIAC MARKERS:                              16.2   10.68 )-----------( 161      ( 09 Mar 2022 13:29 )             48.3     03-09    132<L>  |  95<L>  |  38<H>  ----------------------------<  127<H>  3.9   |  21<L>  |  2.93<H>    Ca    9.0      09 Mar 2022 13:29    TPro  6.9  /  Alb  3.9  /  TBili  1.0  /  DBili  x   /  AST  20  /  ALT  14  /  AlkPhos  66  03-09    proBNP:   Lipid Profile:   HgA1c:   TSH:   PT/INR - ( 09 Mar 2022 13:29 )   PT: 11.5 sec;   INR: 0.99 ratio         PTT - ( 09 Mar 2022 13:29 )  PTT:36.7 sec    PREVIOUS DIAGNOSTIC TESTING:    < from: 12 Lead ECG (03.03.22 @ 04:03) >  Diagnosis Line NORMAL SINUS RHYTHM  T WAVE ABNORMALITY, CONSIDER ANTERIOR ISCHEMIA  ABNORMAL ECG  NO PREVIOUS ECGS AVAILABLE    < from: Transthoracic Echocardiogram (02.22.22 @ 06:55) >  1. Normal left ventricular systolic function. LVEF using  biplane Marroquin's is 71%. No segmental wall motion  abnormalities. There is a sigmoid septum measuring  1.7cm.Normal diastolic function  2. Normal right atrium. Normal right ventricular size and  function.Normal tricuspid valve. Mild tricuspid  regurgitation.Estimated pulmonary artery systolic pressure  equals 28 mm Hg, assuming right atrial pressure equals 3  mm Hg, consistent with normal pulmonary pressures.  3. No pericardial effusion seen.    < from: CT Abdomen and Pelvis w/ IV Cont (03.03.22 @ 06:09) >  No evidence of malignancy in the chest, abdomen and pelvis.    Moderate bilateral hydroureteronephrosis to the level of the urinary   bladder, which is distended with urine likely secondary to bladder outlet   obstruction.

## 2022-03-09 NOTE — ED ADULT NURSE NOTE - NS ED NURSE RECORD ANOTHER HT AND WT
: Chelo Cecilia was seen in X-ray today for a lumbar epidural injection. Patient rated pain before procedure 4/10. Patient discharged home with her friend.   Yes

## 2022-03-09 NOTE — H&P ADULT - ASSESSMENT
70 yo M     h/o   DM, HLD,  BPH  sent home with berry from priro visit  No abdominal pain, no fever, no chest pain, no sob no dysuria.  COVID vaccinated no sick contacts.    card  cindy guevara  and  endo  cindy littlejohn,     CT,  on 3/3/22 bilateral hydro to level of bladder, consistent with bladder outlet obstruction.        *  now  sent to [facility] by his  pmd.  for   low  sbp and  with   GINA  on iv fuids/  keep berry    * bph  on flomax   *  DM,  known to cindy littlejohn    follow  fs    *  HLD, on asa, lipitor     stop cozaar    * on  dvt ppx       < from: CT Abdomen and Pelvis w/ IV Cont (03.03.22 @ 06:09) >  IMPRESSION:  No evidence of malignancy in the chest, abdomen and pelvis.  Moderate bilateral hydroureteronephrosis to the level of the urinary   bladder, which is distended with urine likely secondary to bladder outlet   obstruction.  --- End of Report ---  < end of copied text >       rad< from: Transthoracic Echocardiogram (02.22.22 @ 06:55) >  Conclusions:  1. Normal left ventricular systolic function. LVEF using  biplane Marroquin's is 71%. No segmental wall motion  abnormalities. There is a sigmoid septum measuring  1.7cm.Normal diastolic function  2. Normal right atrium. Normal right ventricular size and  function.Normal tricuspid valve. Mild tricuspid  regurgitation.Estimated pulmonary artery systolic pressure  equals 28 mm Hg, assuming right atrial pressure equals 3  mm Hg, consistent with normal pulmonary pressures.  3. No pericardial effusion seen.  < end of copied text >

## 2022-03-09 NOTE — PATIENT PROFILE ADULT - FALL HARM RISK - HARM RISK INTERVENTIONS

## 2022-03-09 NOTE — ED ADULT NURSE NOTE - OBJECTIVE STATEMENT
71 year old male pt presented to the ED stating sent by pmd for low bp, pt ambulatory A&OX3, states lightheadedness when standing, denies any recent falls or syncope, states dec PO x 2 weeks, pt with indwelling berry leg bag with clear yellow urine draining, pt denies any urinary symptoms, leg bag changed to berry bag, pt denies any fever no chills no nausea no vomiting denies any chest pain no shortness of breath, lung field cta abd soft non tender non distended, ecchymosis to right lower abd pt states its from the insulin

## 2022-03-09 NOTE — ED PROVIDER NOTE - ST/T WAVE
Pt's  informed and he voiced understanding. Advised that when he calls for the appt and they require an MRI prior to an appt. , to call our office and Dr. Dwain Conklin will order. Pt's  voiced understanding. TWI ant leads

## 2022-03-09 NOTE — ED PROVIDER NOTE - PROGRESS NOTE DETAILS
Ramírez checked, functioning well. Bladder empty Renal US done, hydro noted, final result pending, d/w bob waters, will admit patient

## 2022-03-09 NOTE — ED ADULT TRIAGE NOTE - CHIEF COMPLAINT QUOTE
sent from PMD (dr. quispe, [place]) for hypotension (recently started on losartan)  recent d/c from here for "high blood pressure and high sugar"

## 2022-03-09 NOTE — ED PROVIDER NOTE - PHYSICAL EXAMINATION
well appearing, no distress, initial hypotension resolved  clear lungs  S1-2  soft abd non tenderness  berry in place  no peripheral edema  well perfused

## 2022-03-09 NOTE — H&P ADULT - HISTORY OF PRESENT ILLNESS
: 71yr M     h/o   DM ,  bph , had a  home foely,     w recent ED visit for hematuria      sent today from office for persistent hypotension.   denies  fever chills/  cp, sob, abd pain, no diarrhea, no vomiting,   non smoker, no etoh

## 2022-03-09 NOTE — CONSULT NOTE ADULT - ASSESSMENT
71yr M h/o   DM ,  bph , had a  home foely, w recent ED visit for hematuria  sent today from office for persistent hypotension.   denies  fever chills/  cp, sob, abd pain, no diarrhea, no vomiting.  pt denies of any chest pain/ sob.  while in ER pt with decrease bp started on ivf, pt was on losartan and was dcd with no bp meds. pt with echo with  normal EF and was scheduled for stress test.   pt with urinary retention and bl hydronephrosis  post obstructive diuresis increase fluid  fu lytes  repeat ecg, pt with abnormal ecg but normal echo  r/o sepsis  dvt prophylaxis

## 2022-03-10 LAB
ANION GAP SERPL CALC-SCNC: 15 MMOL/L — SIGNIFICANT CHANGE UP (ref 5–17)
BUN SERPL-MCNC: 27 MG/DL — HIGH (ref 7–23)
CALCIUM SERPL-MCNC: 8.7 MG/DL — SIGNIFICANT CHANGE UP (ref 8.4–10.5)
CHLORIDE SERPL-SCNC: 99 MMOL/L — SIGNIFICANT CHANGE UP (ref 96–108)
CO2 SERPL-SCNC: 22 MMOL/L — SIGNIFICANT CHANGE UP (ref 22–31)
CREAT SERPL-MCNC: 1.72 MG/DL — HIGH (ref 0.5–1.3)
CULTURE RESULTS: NO GROWTH — SIGNIFICANT CHANGE UP
EGFR: 42 ML/MIN/1.73M2 — LOW
GLUCOSE BLDC GLUCOMTR-MCNC: 106 MG/DL — HIGH (ref 70–99)
GLUCOSE BLDC GLUCOMTR-MCNC: 106 MG/DL — HIGH (ref 70–99)
GLUCOSE BLDC GLUCOMTR-MCNC: 124 MG/DL — HIGH (ref 70–99)
GLUCOSE BLDC GLUCOMTR-MCNC: 126 MG/DL — HIGH (ref 70–99)
GLUCOSE SERPL-MCNC: 78 MG/DL — SIGNIFICANT CHANGE UP (ref 70–99)
HCT VFR BLD CALC: 44.9 % — SIGNIFICANT CHANGE UP (ref 39–50)
HGB BLD-MCNC: 14.9 G/DL — SIGNIFICANT CHANGE UP (ref 13–17)
MCHC RBC-ENTMCNC: 28.4 PG — SIGNIFICANT CHANGE UP (ref 27–34)
MCHC RBC-ENTMCNC: 33.2 GM/DL — SIGNIFICANT CHANGE UP (ref 32–36)
MCV RBC AUTO: 85.5 FL — SIGNIFICANT CHANGE UP (ref 80–100)
NRBC # BLD: 0 /100 WBCS — SIGNIFICANT CHANGE UP (ref 0–0)
PLATELET # BLD AUTO: 143 K/UL — LOW (ref 150–400)
POTASSIUM SERPL-MCNC: 3.8 MMOL/L — SIGNIFICANT CHANGE UP (ref 3.5–5.3)
POTASSIUM SERPL-SCNC: 3.8 MMOL/L — SIGNIFICANT CHANGE UP (ref 3.5–5.3)
RBC # BLD: 5.25 M/UL — SIGNIFICANT CHANGE UP (ref 4.2–5.8)
RBC # FLD: 13.2 % — SIGNIFICANT CHANGE UP (ref 10.3–14.5)
SODIUM SERPL-SCNC: 136 MMOL/L — SIGNIFICANT CHANGE UP (ref 135–145)
SPECIMEN SOURCE: SIGNIFICANT CHANGE UP
WBC # BLD: 6.55 K/UL — SIGNIFICANT CHANGE UP (ref 3.8–10.5)
WBC # FLD AUTO: 6.55 K/UL — SIGNIFICANT CHANGE UP (ref 3.8–10.5)

## 2022-03-10 RX ADMIN — HEPARIN SODIUM 5000 UNIT(S): 5000 INJECTION INTRAVENOUS; SUBCUTANEOUS at 05:12

## 2022-03-10 RX ADMIN — HEPARIN SODIUM 5000 UNIT(S): 5000 INJECTION INTRAVENOUS; SUBCUTANEOUS at 18:09

## 2022-03-10 RX ADMIN — CEFTRIAXONE 100 MILLIGRAM(S): 500 INJECTION, POWDER, FOR SOLUTION INTRAMUSCULAR; INTRAVENOUS at 20:27

## 2022-03-10 RX ADMIN — SODIUM CHLORIDE 80 MILLILITER(S): 9 INJECTION INTRAMUSCULAR; INTRAVENOUS; SUBCUTANEOUS at 21:59

## 2022-03-10 NOTE — PROGRESS NOTE ADULT - ASSESSMENT
72 yo M     h/o   DM, HLD,  BPH  sent home with berry from priro visit  No abdominal pain, no fever, no chest pain, no sob no dysuria.  COVID vaccinated no sick contacts.    card  cindy guevara  and  endo  cindy littlejohn,     CT,  on 3/3/22 bilateral hydro to level of bladder, consistent with bladder outlet obstruction.        *  now  sent to [facility] by his  pmd.  for   low  sbp and  with   GINA  on iv fuids/  keep berry    * bph  on flomax   *  DM,  known to cindy littlejohn    follow  fs    *  HLD, on asa, lipitor     stop cozaar    * on  dvt ppx  renal  u/s, b/l hydro/  urology eval   crt is dcerasing       < from: CT Abdomen and Pelvis w/ IV Cont (03.03.22 @ 06:09) >  IMPRESSION:  No evidence of malignancy in the chest, abdomen and pelvis.  Moderate bilateral hydroureteronephrosis to the level of the urinary   bladder, which is distended with urine likely secondary to bladder outlet   obstruction.  --- End of Report ---  < end of copied text >       rad< from: Transthoracic Echocardiogram (02.22.22 @ 06:55) >  Conclusions:  1. Normal left ventricular systolic function. LVEF using  biplane Marroquin's is 71%. No segmental wall motion  abnormalities. There is a sigmoid septum measuring  1.7cm.Normal diastolic function  2. Normal right atrium. Normal right ventricular size and  function.Normal tricuspid valve. Mild tricuspid  regurgitation.Estimated pulmonary artery systolic pressure  equals 28 mm Hg, assuming right atrial pressure equals 3  mm Hg, consistent with normal pulmonary pressures.  3. No pericardial effusion seen.  < end of copied text >         72 yo M     h/o   DM, HLD,  BPH  sent home with berry from priro visit  No abdominal pain, no fever, no chest pain, no sob no dysuria.  COVID vaccinated no sick contacts.    card  cindy guevara  and  endo  cindy littlejohn,     CT,  on 3/3/22 bilateral hydro to level of bladder, consistent with bladder outlet obstruction.        *  now  sent to [facility] by his  pmd.  for   low  sbp and  with   GINA  on iv fuids/  keep berry    * bph  on flomax   *  DM,  known to cindy littlejohn    follow  fs    *  HLD, on asa, lipitor     stop cozaar    * on  dvt ppx  renal  u/s, b/l hydro/  urology eval dr ballard   crt is dcerasing       < from: CT Abdomen and Pelvis w/ IV Cont (03.03.22 @ 06:09) >  IMPRESSION:  No evidence of malignancy in the chest, abdomen and pelvis.  Moderate bilateral hydroureteronephrosis to the level of the urinary   bladder, which is distended with urine likely secondary to bladder outlet   obstruction.  --- End of Report ---  < end of copied text >       rad< from: Transthoracic Echocardiogram (02.22.22 @ 06:55) >  Conclusions:  1. Normal left ventricular systolic function. LVEF using  biplane Marroquin's is 71%. No segmental wall motion  abnormalities. There is a sigmoid septum measuring  1.7cm.Normal diastolic function  2. Normal right atrium. Normal right ventricular size and  function.Normal tricuspid valve. Mild tricuspid  regurgitation.Estimated pulmonary artery systolic pressure  equals 28 mm Hg, assuming right atrial pressure equals 3  mm Hg, consistent with normal pulmonary pressures.  3. No pericardial effusion seen.  < end of copied text >

## 2022-03-11 LAB
ANION GAP SERPL CALC-SCNC: 14 MMOL/L — SIGNIFICANT CHANGE UP (ref 5–17)
BUN SERPL-MCNC: 16 MG/DL — SIGNIFICANT CHANGE UP (ref 7–23)
CALCIUM SERPL-MCNC: 8.7 MG/DL — SIGNIFICANT CHANGE UP (ref 8.4–10.5)
CHLORIDE SERPL-SCNC: 99 MMOL/L — SIGNIFICANT CHANGE UP (ref 96–108)
CO2 SERPL-SCNC: 24 MMOL/L — SIGNIFICANT CHANGE UP (ref 22–31)
CREAT SERPL-MCNC: 1.21 MG/DL — SIGNIFICANT CHANGE UP (ref 0.5–1.3)
EGFR: 64 ML/MIN/1.73M2 — SIGNIFICANT CHANGE UP
GLUCOSE BLDC GLUCOMTR-MCNC: 101 MG/DL — HIGH (ref 70–99)
GLUCOSE BLDC GLUCOMTR-MCNC: 112 MG/DL — HIGH (ref 70–99)
GLUCOSE BLDC GLUCOMTR-MCNC: 128 MG/DL — HIGH (ref 70–99)
GLUCOSE BLDC GLUCOMTR-MCNC: 136 MG/DL — HIGH (ref 70–99)
GLUCOSE SERPL-MCNC: 119 MG/DL — HIGH (ref 70–99)
POTASSIUM SERPL-MCNC: 3.4 MMOL/L — LOW (ref 3.5–5.3)
POTASSIUM SERPL-SCNC: 3.4 MMOL/L — LOW (ref 3.5–5.3)
SODIUM SERPL-SCNC: 137 MMOL/L — SIGNIFICANT CHANGE UP (ref 135–145)

## 2022-03-11 RX ORDER — ASPIRIN/CALCIUM CARB/MAGNESIUM 324 MG
81 TABLET ORAL DAILY
Refills: 0 | Status: DISCONTINUED | OUTPATIENT
Start: 2022-03-11 | End: 2022-03-13

## 2022-03-11 RX ORDER — ATORVASTATIN CALCIUM 80 MG/1
20 TABLET, FILM COATED ORAL AT BEDTIME
Refills: 0 | Status: DISCONTINUED | OUTPATIENT
Start: 2022-03-11 | End: 2022-03-13

## 2022-03-11 RX ORDER — ACETAMINOPHEN 500 MG
975 TABLET ORAL ONCE
Refills: 0 | Status: DISCONTINUED | OUTPATIENT
Start: 2022-03-11 | End: 2022-03-11

## 2022-03-11 RX ORDER — TAMSULOSIN HYDROCHLORIDE 0.4 MG/1
0.4 CAPSULE ORAL AT BEDTIME
Refills: 0 | Status: DISCONTINUED | OUTPATIENT
Start: 2022-03-11 | End: 2022-03-13

## 2022-03-11 RX ORDER — POTASSIUM CHLORIDE 20 MEQ
40 PACKET (EA) ORAL ONCE
Refills: 0 | Status: COMPLETED | OUTPATIENT
Start: 2022-03-11 | End: 2022-03-11

## 2022-03-11 RX ADMIN — Medication 40 MILLIEQUIVALENT(S): at 18:44

## 2022-03-11 RX ADMIN — HEPARIN SODIUM 5000 UNIT(S): 5000 INJECTION INTRAVENOUS; SUBCUTANEOUS at 05:40

## 2022-03-11 RX ADMIN — HEPARIN SODIUM 5000 UNIT(S): 5000 INJECTION INTRAVENOUS; SUBCUTANEOUS at 18:43

## 2022-03-11 NOTE — DISCHARGE NOTE PROVIDER - NPI NUMBER (FOR SYSADMIN USE ONLY) :
[[id number]] [[id number]],[[id number]] [[id number]],[[id number]],[[id number]],[[id number]],[[id number]]

## 2022-03-11 NOTE — DISCHARGE NOTE PROVIDER - PROVIDER TOKENS
PROVIDER:[TOKEN:[[id number]]] PROVIDER:[TOKEN:[[id number]]],PROVIDER:[TOKEN:[[id number]]] PROVIDER:[TOKEN:[[id number]]],PROVIDER:[TOKEN:[[id number]]],PROVIDER:[TOKEN:[[id number]],FOLLOWUP:[1 week]],PROVIDER:[TOKEN:[[id number]],FOLLOWUP:[1 week]],PROVIDER:[TOKEN:[[id number]],FOLLOWUP:[1 week]]

## 2022-03-11 NOTE — DISCHARGE NOTE PROVIDER - HOSPITAL COURSE
· Assessment    72 yo M     h/o   DM, HLD,  BPH  sent home with berry from priro visit  No abdominal pain, no fever, no chest pain, no sob no dysuria.  COVID vaccinated no sick contacts.    card  cindy guevara  and  endo  cindy littlejohn,     CT,  on 3/3/22 bilateral hydro to level of bladder, consistent with bladder outlet obstruction.        *  now  sent to [facility] by his  pmd.  for   low  sbp and  with   GINA  on iv fuids/  keep berry    * bph  on flomax   *  DM,  known to cindy littlejohn    follow  fs    *  HLD, on asa, lipitor     stop cozaar    * on  dvt ppx  renal  u/s, b/l hydro/  urology eval dr ballard   crt is dcerasing.  resolve d gina  urine  c/s, negative    per   urology,  d/c  with c/c  berry     · Assessment    70 yo M     h/o   DM, HLD,  BPH  sent home with berry from priro visit  No abdominal pain, no fever, no chest pain, no sob no dysuria.  COVID vaccinated no sick contacts.    card  cindy guevara  and  endo  cindy littlejohn,     CT,  on 3/3/22 bilateral hydro to level of bladder, consistent with bladder outlet obstruction.      *  now  sent to e r by his  pmd.  for   low  sbp and  with   GINA  on iv fuids/  keep berry    * bph  on flomax   *  DM,  known to cindy littlejohn    follow  fs    *  HLD, on asa, lipitor     stop cozaar    * on  dvt ppx  renal  u/s, b/l hydro/  urology dr ballard.  pt  to leave  with  berry  daughter  aware, .  but  states  she is unable to take  care of  pt. and care chelsean  is  seeking rehab     per   urology,  d/c  with c/c  berry    Pt is HDS and ready for discharge. Med rec to be reviewed prior to discharge.  Pt has no skilled needs.     · Assessment    70 yo M     h/o   DM, HLD,  BPH  sent home with berry from priro visit  No abdominal pain, no fever, no chest pain, no sob no dysuria.  COVID vaccinated no sick contacts.    card  cindy guevara  and  endo  cindy littlejohn,     CT,  on 3/3/22 bilateral hydro to level of bladder, consistent with bladder outlet obstruction.      *  now  sent to e r by his  pmd.  for   low  sbp and  with   GINA  on iv fuids/  keep berry    * bph  on flomax   *  DM,  known to cindy littlejohn    follow  fs    *  HLD, on asa, lipitor     stop cozaar    * on  dvt ppx  renal  u/s, b/l hydro/  urology dr ballard.  pt  to leave  with  berry  daughter  aware, .  but  states  she is unable to take  care of  pt. and jessica kwong  is  seeking rehab  pe r PT, ok for home   daughter  states, unable to take care of  pt. hence  pt keeps returning to hosp/  seen by  care cortn     per   urology,  d/c  with c/c  kristi    pmd  is dr brittaney rubalcava

## 2022-03-11 NOTE — DISCHARGE NOTE PROVIDER - CARE PROVIDER_API CALL
Increased latency Cameron Sandra)  Urology  [address] [place] [address]  Phone: [phone]  Fax: [phone]  Follow Up Time:    Cameron Sandra)  Urology  [address] [place] [address]  Phone: [phone]  Fax: [phone]  Follow Up Time:     Edwardo Eastman)  Internal Medicine  [address], [address] [place] [address]  Phone: [phone]  Fax: [phone]  Follow Up Time:    Cameron Sandra)  Urology  [address] [place] [address]  Phone: [phone]  Fax: [phone]  Follow Up Time:     Edwardo Eastman)  Internal Medicine  [address], [address] [place] [address]  Phone: [phone]  Fax: [phone]  Follow Up Time:     Nini Carpenter)  EndocrinologyMetabDiabetes; Internal Medicine  [address] [place] [address]  Phone: [phone]  Fax: [phone]  Follow Up Time: 1 week    Nirmal Byrd  CARDIOVASCULAR DISEASE  [address] [place] [address], [address]  [place] [address]  Phone: [phone]  Fax: [phone]  Follow Up Time: 1 week    Goldberg, Gary D D  UROLOGY  [address] [facility], [address]  [place] [address]  Phone: [phone]  Fax: [phone]  Follow Up Time: 1 week

## 2022-03-11 NOTE — DISCHARGE NOTE NURSING/CASE MANAGEMENT/SOCIAL WORK - NSDCPETBCESMAN_GEN_ALL_CORE
Interval/Overnight Events:  No acute overnight events.    VITAL SIGNS:  T(C): 37.2 (12-01-18 @ 02:00), Max: 37.7 (11-30-18 @ 20:00)  HR: 103 (12-01-18 @ 09:04) (86 - 126)  BP: 86/42 (12-01-18 @ 05:00) (86/42 - 120/84)  RR: 28 (12-01-18 @ 05:00) (27 - 47)  SpO2: 100% (12-01-18 @ 09:04) (97% - 100%)    RESPIRATORY:  [x] End-Tidal CO2: 28  [x] Mechanical Ventilation: Mode: SIMV with PS, RR (machine): 8, TV (machine): 240, FiO2: 21, PEEP: 6, PS: 15, ITime: 1, MAP: 11, PIP: 22    Respiratory Medications:  ALBUTerol  Intermittent Nebulization - Peds 2.5 milliGRAM(s) Nebulizer every 6 hours  sodium chloride 3% for Nebulization - Peds 3 milliLiter(s) Nebulizer every 6 hours    CARDIOVASCULAR  Cardiac Rhythm:	[x] NSR		[ ] Other:      HEMATOLOGIC/ONCOLOGIC:                                            8.8                   Neutrophils% (auto):   71.9   (12-01 @ 04:05):    18.32)-----------(452          Lymphocytes% (auto):  16.6                                          25.7                   Eosinophils% (auto):   1.9      INFECTIOUS DISEASE:  Antimicrobials/Immunologic Medications:  epoetin stephanie Injection - Peds 1000 Unit(s) SubCutaneous <User Schedule>  vancomycin 2 mG/mL - heparin  Lock 100 Units/mL - Peds 0.5 milliLiter(s) Catheter daily    FLUIDS/ELECTROLYTES/NUTRITION:  I&O's Summary    30 Nov 2018 07:01  -  01 Dec 2018 07:00  --------------------------------------------------------  IN: 2107.6 mL / OUT: 1508 mL / NET: 599.6 mL      Daily   12-01    138  |  102  |  22  ----------------------------<  875<HH>  5.5<H>   |  20<L>  |  0.30<L>    Ca    9.8      01 Dec 2018 04:05  Phos  6.1     12-01  Mg     2.9     12-01    TPro  6.8  /  Alb  2.6<L>  /  TBili  0.3  /  DBili  x   /  AST  80<H>  /  ALT  118<H>  /  AlkPhos  542<H>  12-01      Diet:	[ ] Regular	[ ] Soft		[ ] Clears	[x] NPO  .	[ ] Other:  .	[ ] NGT		[ ] NDT		[ ] GT		[ ] GJT    Gastrointestinal Medications:  famotidine IV Intermittent - Peds 13.6 milliGRAM(s) IV Intermittent every 12 hours  pantoprazole  IV Intermittent - Peds 20 milliGRAM(s) IV Intermittent every 12 hours  octreotide Infusion - Peds 2 MICROgram(s)/kG/Hr IV Continuous <Continuous>  Parenteral Nutrition - Pediatric 1 Each TPN Continuous <Continuous>  sodium chloride 0.9% lock flush - Peds 10 milliLiter(s) IV Push every 12 hours    NEUROLOGY:  [ ] SBS:		[ ] FILIPE-1:	[ ] BIS:  [x] Adequacy of sedation and pain control has been assessed and adjusted    Neurologic Medications:  morphine  IV Intermittent - Peds 1.4 milliGRAM(s) IV Intermittent every 4 hours PRN  PHENobarbital IV Intermittent - Peds 54 milliGRAM(s) IV Intermittent every 12 hours  scopolamine 1.5 mG Transdermal Patch - Peds 1 Patch Transdermal every 72 hours  cloNIDine 0.3 mG/24Hr(s) Transdermal Patch - Peds 1 Patch Transdermal every 7 days    OTHER MEDICATIONS:  Endocrine/Metabolic Medications:  insulin glargine SubCutaneous Injection (LANTUS) - Peds 11 Unit(s) SubCutaneous at bedtime    Topical/Other Medications:  chlorhexidine 0.12% Oral Liquid - Peds 15 milliLiter(s) Swish and Spit two times a day  polyvinyl alcohol 1.4%/povidone 0.6% Ophthalmic Solution - Peds 1 Drop(s) Both EYES every 8 hours      PATIENT CARE ACCESS DEVICES:  [x] Peripheral IV  [ ] Central Venous Line	[ ] R	[ ] L	[ ] IJ	[ ] Fem	[ ] SC			Placed:   [ ] Arterial Line		[ ] R	[ ] L	[ ] PT	[ ] DP	[ ] Fem	[ ] Rad	[ ] Ax	Placed:   [x] PICC:	 Left brachial		[ ] Broviac		[ ] Mediport  [ ] Urinary Catheter, Date Placed:   [x] Necessity of urinary, arterial, and venous catheters discussed    PHYSICAL EXAM:  Respiratory: [ ] Normal  .	Breath Sounds:		[ ] Normal  .	Rhonchi		[ ] Right		[ ] Left  .	Wheezing		[ ] Right		[ ] Left  .	Diminished		[ ] Right		[ ] Left  .	Crackles		[ ] Right		[ ] Left  .	Effort:			[x] Even unlabored	[ ] Nasal Flaring		[ ] Grunting  .				[ ] Stridor		[ ] Retractions  .				[x] Ventilator assisted  .	Comments: Tracheostomy in place    Cardiovascular:	[x] Normal  .	Murmur:		[ ] None		[ ] Present:  .	Capillary Refill		[ ] Brisk, less than 2 seconds	[ ] Prolonged:  .	Pulses:			[ ] Equal and strong		[ ] Other:  .	Comments:    Abdominal: [ ] Normal  .	Characteristics:	[ ] Soft	[ ] Distended	[ ] Tender	[ ] Taut	[ ] Rigid	[ ] BS Absent  .	Comments: G-tube in place; soft, non-tender, non-distended    Skin: [x] Normal  .	Edema:		[ ] None		[ ] Generalized	[ ] 1+	[ ] 2+	[ ] 3+	[ ] 4+  .	Rash:		[ ] None		[ ] Present:  .	Comments:    Neurologic: [ ] Normal  .	Characteristics:	[ ] Alert		[ ] Sedated	[x] No acute change from baseline  .	Comments: Spastic quadriplegia    Parent/Guardian is at the bedside:	[x] Yes	[ ] No  Patient and Parent/Guardian updated as to the progress/plan of care:	[x] Yes	[ ] No    [x] The patient remains in critical and unstable condition, and requires ICU care and monitoring  [ ] The patient is improving but requires continued monitoring and adjustment of therapy    [x] Total critical care time spent by attending physician with patient was _45_ minutes, excluding procedure time If you are a smoker, it is important for your health to stop smoking. Please be aware that second hand smoke is also harmful.

## 2022-03-11 NOTE — PROGRESS NOTE ADULT - ASSESSMENT
72 yo M     h/o   DM, HLD,  BPH  sent home with berry from priro visit  No abdominal pain, no fever, no chest pain, no sob no dysuria.  COVID vaccinated no sick contacts.    card  cindy guveara  and  endo  cindy littlejohn,     CT,  on 3/3/22 bilateral hydro to level of bladder, consistent with bladder outlet obstruction.        *  now  sent to e r by his  pmd.  for   low  sbp and  with   GINA  on iv fuids/  keep berry    * bph  on flomax   *  DM,  known to cindy littlejohn    follow  fs    *  HLD, on asa, lipitor     stop cozaar    * on  dvt ppx  renal  u/s, b/l hydro/  urology eval dr ballard   crt is dcerasing.  resolve d gina    [facility]  eval/ hypokalemia       < from: CT Abdomen and Pelvis w/ IV Cont (03.03.22 @ 06:09) >  IMPRESSION:  No evidence of malignancy in the chest, abdomen and pelvis.  Moderate bilateral hydroureteronephrosis to the level of the urinary   bladder, which is distended with urine likely secondary to bladder outlet   obstruction.  --- End of Report ---  < end of copied text >       rad< from: Transthoracic Echocardiogram (02.22.22 @ 06:55) >  Conclusions:  1. Normal left ventricular systolic function. LVEF using  biplane Marroquin's is 71%. No segmental wall motion  abnormalities. There is a sigmoid septum measuring  1.7cm.Normal diastolic function  2. Normal right atrium. Normal right ventricular size and  function.Normal tricuspid valve. Mild tricuspid  regurgitation.Estimated pulmonary artery systolic pressure  equals 28 mm Hg, assuming right atrial pressure equals 3  mm Hg, consistent with normal pulmonary pressures.  3. No pericardial effusion seen.  < end of copied text >         70 yo M     h/o   DM, HLD,  BPH  sent home with berry from priro visit  No abdominal pain, no fever, no chest pain, no sob no dysuria.  COVID vaccinated no sick contacts.    card  cindy guevara  and  endo  cindy littlejohn,     CT,  on 3/3/22 bilateral hydro to level of bladder, consistent with bladder outlet obstruction.        *  now  sent to e r by his  pmd.  for   low  sbp and  with   GINA  on iv fuids/  keep berry    * bph  on flomax   *  DM,  known to cindy littlejohn    follow  fs    *  HLD, on asa, lipitor     stop cozaar    * on  dvt ppx  renal  u/s, b/l hydro/  urology eval dr ballard   crt is dcerasing.  resolve d gina  urine  c/s, negative    per   urology,  d/c  with c/c  berry       < from: CT Abdomen and Pelvis w/ IV Cont (03.03.22 @ 06:09) >  IMPRESSION:  No evidence of malignancy in the chest, abdomen and pelvis.  Moderate bilateral hydroureteronephrosis to the level of the urinary   bladder, which is distended with urine likely secondary to bladder outlet   obstruction.  --- End of Report ---  < end of copied text >       rad< from: Transthoracic Echocardiogram (02.22.22 @ 06:55) >  Conclusions:  1. Normal left ventricular systolic function. LVEF using  biplane Marroquin's is 71%. No segmental wall motion  abnormalities. There is a sigmoid septum measuring  1.7cm.Normal diastolic function  2. Normal right atrium. Normal right ventricular size and  function.Normal tricuspid valve. Mild tricuspid  regurgitation.Estimated pulmonary artery systolic pressure  equals 28 mm Hg, assuming right atrial pressure equals 3  mm Hg, consistent with normal pulmonary pressures.  3. No pericardial effusion seen.  < end of copied text >

## 2022-03-11 NOTE — DISCHARGE NOTE PROVIDER - NSDCCPCAREPLAN_GEN_ALL_CORE_FT
PRINCIPAL DISCHARGE DIAGNOSIS  Diagnosis: GINA (acute kidney injury)  Assessment and Plan of Treatment:   Imaging showing improvement in hydronephrosis.  - maintain Ramírez  - should go home with Ramírez to leg bag  - continue Flomax  - outpatient urology follow up for retention workup with:  Cameron Sandra MD  [facility] [place] [address]  [phone]      SECONDARY DISCHARGE DIAGNOSES  Diagnosis: Hyperlipidemia  Assessment and Plan of Treatment: Continue with your cholesterol medications. Eat a heart healthy diet that is low in saturated fats and salt, and includes whole grains, fruits, vegetables and lean protein; exercise regularly (consult with your physician or cardiologist first); maintain a heart healthy weight; if you are on medication to help you quit smoking, be sure to take it as prescribed. Find healthy ways to deal with stress, such as exercise (check with your healthcare provider first), deep breathing, meditation, or enjoyable healthy hobbies. Continue to follow with your primary physician or cardiologist.      Diagnosis: DM (diabetes mellitus)  Assessment and Plan of Treatment: Make sure you get your HgA1c checked every three months.  If you take oral diabetes medications, check your blood glucose two times a day.  If you take insulin, check your blood glucose before meals and at bedtime.  You should call their doctor when glucose <70 or above >400 and/or consistently above 200s as changes in the regimen will need to be made.  It is important not to skip any meals.  Keep a log of your blood glucose results and always take it with you to your doctor appointments.  Keep a list of your current medications including injectables and over the counter medications and bring this medication list with you to all your doctor appointments.  If you have not seen your ophthalmologist this year, call for appointment.  Check your feet daily for redness, sores, or openings. Do not self treat. If no improvement in two days call your primary care physician for an appointment.  Low blood sugar (hypoglycemia) is a blood sugar below 70mg/dl. Check your blood sugar if you feel signs/symptoms of hypoglycemia. If your blood sugar is below 70, take 15 grams of carbohydrates (ex 4 oz of apple juice, 3-4 glucose tablets, or 4-6 oz of regular soda), wait 15 minutes and repeat blood sugar to make sure it comes up above 70.  If your blood sugar is above 70 and you are due for a meal, have a meal.  If you are not due for a meal, have a snack.  This snack helps keeps your blood sugar at a safe range.       PRINCIPAL DISCHARGE DIAGNOSIS  Diagnosis: GINA (acute kidney injury)  Assessment and Plan of Treatment:   Imaging showing improvement in hydronephrosis.  - maintain Ramírez  - should go home with Ramírez to leg bag  - continue Flomax  - outpatient urology follow up for retention workup with:  Cmaeron Sandra MD  [facility] [place] [address]  [phone]      SECONDARY DISCHARGE DIAGNOSES  Diagnosis: Hyperlipidemia  Assessment and Plan of Treatment: Continue with your cholesterol medications. Eat a heart healthy diet that is low in saturated fats and salt, and includes whole grains, fruits, vegetables and lean protein; exercise regularly (consult with your physician or cardiologist first); maintain a heart healthy weight; if you are on medication to help you quit smoking, be sure to take it as prescribed. Find healthy ways to deal with stress, such as exercise (check with your healthcare provider first), deep breathing, meditation, or enjoyable healthy hobbies. Continue to follow with your primary physician or cardiologist.      Diagnosis: DM (diabetes mellitus)  Assessment and Plan of Treatment: Make sure you get your HgA1c checked every three months.  If you take oral diabetes medications, check your blood glucose two times a day.  If you take insulin, check your blood glucose before meals and at bedtime.  You should call their doctor when glucose <70 or above >400 and/or consistently above 200s as changes in the regimen will need to be made.  It is important not to skip any meals.  Keep a log of your blood glucose results and always take it with you to your doctor appointments.  Keep a list of your current medications including injectables and over the counter medications and bring this medication list with you to all your doctor appointments.  If you have not seen your ophthalmologist this year, call for appointment.  Check your feet daily for redness, sores, or openings. Do not self treat. If no improvement in two days call your primary care physician for an appointment.  Low blood sugar (hypoglycemia) is a blood sugar below 70mg/dl. Check your blood sugar if you feel signs/symptoms of hypoglycemia. If your blood sugar is below 70, take 15 grams of carbohydrates (ex 4 oz of apple juice, 3-4 glucose tablets, or 4-6 oz of regular soda), wait 15 minutes and repeat blood sugar to make sure it comes up above 70.  If your blood sugar is above 70 and you are due for a meal, have a meal.  If you are not due for a meal, have a snack.  This snack helps keeps your blood sugar at a safe range.  Folow up with Dr. Carpenter    Diagnosis: Hypotension  Assessment and Plan of Treatment: Cozaar on hold  Follow up with cardiologist, Dr. Byrd  Discuss stress test as outpatient     PRINCIPAL DISCHARGE DIAGNOSIS  Diagnosis: GINA (acute kidney injury)  Assessment and Plan of Treatment:   Imaging showing improvement in hydronephrosis.  - maintain Ramírez  - should go home with Ramírez to leg bag  - continue Flomax  - outpatient urology follow up for retention workup with:  Cameron Sandra MD  [facility] [place] [address]  [phone]      SECONDARY DISCHARGE DIAGNOSES  Diagnosis: Hyperlipidemia  Assessment and Plan of Treatment: Continue with your cholesterol medications. Eat a heart healthy diet that is low in saturated fats and salt, and includes whole grains, fruits, vegetables and lean protein; exercise regularly (consult with your physician or cardiologist first); maintain a heart healthy weight; if you are on medication to help you quit smoking, be sure to take it as prescribed. Find healthy ways to deal with stress, such as exercise (check with your healthcare provider first), deep breathing, meditation, or enjoyable healthy hobbies. Continue to follow with your primary physician or cardiologist.      Diagnosis: DM (diabetes mellitus)  Assessment and Plan of Treatment: Make sure you get your HgA1c checked every three months.  If you take oral diabetes medications, check your blood glucose two times a day.  If you take insulin, check your blood glucose before meals and at bedtime.  You should call their doctor when glucose <70 or above >400 and/or consistently above 200s as changes in the regimen will need to be made.  It is important not to skip any meals.  Keep a log of your blood glucose results and always take it with you to your doctor appointments.  Keep a list of your current medications including injectables and over the counter medications and bring this medication list with you to all your doctor appointments.  If you have not seen your ophthalmologist this year, call for appointment.  Check your feet daily for redness, sores, or openings. Do not self treat. If no improvement in two days call your primary care physician for an appointment.  Low blood sugar (hypoglycemia) is a blood sugar below 70mg/dl. Check your blood sugar if you feel signs/symptoms of hypoglycemia. If your blood sugar is below 70, take 15 grams of carbohydrates (ex 4 oz of apple juice, 3-4 glucose tablets, or 4-6 oz of regular soda), wait 15 minutes and repeat blood sugar to make sure it comes up above 70.  If your blood sugar is above 70 and you are due for a meal, have a meal.  If you are not due for a meal, have a snack.  This snack helps keeps your blood sugar at a safe range.  HbA1c 11  Please ensure low carb diet  Folow up with Dr. Carpenter    Diagnosis: Hypotension  Assessment and Plan of Treatment: Cozaar on hold  Follow up with cardiologist, Dr. Byrd  Discuss stress test as outpatient

## 2022-03-11 NOTE — CONSULT NOTE ADULT - ASSESSMENT
71yr M DM on ins w recent ED visit for hematuria and UTI with GINA and hydro presents with hypotension. Imaging showing improvement in hydronephrosis  - maintain berry  - should go home with berry to leg bag  - fu urine culture, empiric abx  - flomax if medically acceptable  - outpatient  follow up for retention workup    Cameron Sandra MD  [facility] [address] [address] [phone]

## 2022-03-11 NOTE — DISCHARGE NOTE NURSING/CASE MANAGEMENT/SOCIAL WORK - PATIENT PORTAL LINK FT
You can access the FollowMyHealth Patient Portal offered by [facility] by registering at the following website: [email or web address]. By joining [email or web address]’s FollowMyHealth portal, you will also be able to view your health information using other applications (apps) compatible with our system.

## 2022-03-11 NOTE — CONSULT NOTE ADULT - SUBJECTIVE AND OBJECTIVE BOX
Urology Consult Note    Chief Complaint: hypotension      History of Present Illness: 71yr M DM on ins w recent ED visit for hematuria and UTI had berry placed presenting with hypotension. CT imaging had showed bilateral hydro to level of distended bladder. Renal sono during this admission shows berry in place with mild left hydro and resolved right hydro. WBc is normal and cr is 1.2. UA with alrge LE however with berry in place. Afebrile and hemodynamically stable.       PAST MEDICAL & SURGICAL HISTORY:  Diabetes        FAMILY HISTORY:      Allergies    No Known Allergies    Intolerances        Social History:  Denies tobacco or alcohol use    Review of Systems:   Constitutional: No weight loss, no weakness  HEENT: No visual loss, no hearing loss, no sneezing  Skin: No rash or itching  CV: No chest pain, no chest pressure  Pulm: No shortness of breath, cough, or sputum  GI: No melena, no constipation  : Per HPI  Neuro: No headache, dizziness  MSK: No muscle pain, no joint pain  Heme: No anemia, bruising, or bleeding  Lymphatics: No enlarged nodes, no history of splenectomy  Psych: No depression of anxiety  Endo: No cold or heat intolerance  Allergies: No asthma, hives    Physical Exam:  Vital signs  T(C): 36.6 (22 @ 04:36), Max: 36.8 (03-10-22 @ 20:11)  HR: 67 (22 @ 04:36)  BP: 117/71 (22 @ 04:36)  SpO2: 97% (22 @ 04:36)  Wt(kg): --    Gen: No acute distress. Normal mood  HEENT: Normocephalic, neck supple  CV: Hemodynamically stable  Pulm: No increased work of breathing  Abd: soft, non-tender, non-distended  Back: No CVA tenderness, no midline pain  Extremities: No significant deformity or joint abnormality  Neuro: Alert & oriented x3, No focal deficits  Skin: Skin normal color, normal texture  Psych: Normal affect, normal behavior  : Nonpalpable bladder berry in place yellow urine      Labs:       @ 06:58    WBC --    / Hct --    / SCr 1.21     03-10 @ 07:18    WBC 6.55  / Hct 44.9  / SCr 1.72         137  |  99  |  16  ----------------------------<  119<H>  3.4<L>   |  24  |  1.21    Ca    8.7      11 Mar 2022 06:58    TPro  6.9  /  Alb  3.9  /  TBili  1.0  /  DBili  x   /  AST  20  /  ALT  14  /  AlkPhos  66  03-09    PT/INR - ( 09 Mar 2022 13:29 )   PT: 11.5 sec;   INR: 0.99 ratio         PTT - ( 09 Mar 2022 13:29 )  PTT:36.7 sec  Urinalysis Basic - ( 09 Mar 2022 13:29 )    Color: Light Orange / Appearance: Turbid / S.021 / pH: x  Gluc: x / Ketone: Negative  / Bili: Negative / Urobili: 3 mg/dL   Blood: x / Protein: 100 mg/dL / Nitrite: Negative   Leuk Esterase: Large / RBC: 135 /hpf / WBC 69 /HPF   Sq Epi: x / Non Sq Epi: 6 /hpf / Bacteria: Negative

## 2022-03-11 NOTE — DISCHARGE NOTE PROVIDER - CARE PROVIDERS DIRECT ADDRESSES
,DirectAddress_Unknown ,DirectAddress_Unknown,DirectAddress_Unknown ,DirectAddress_Unknown,DirectAddress_Unknown,DirectAddress_Unknown,DirectAddress_Unknown,[email or web address]

## 2022-03-11 NOTE — DISCHARGE NOTE PROVIDER - NSDCMRMEDTOKEN_GEN_ALL_CORE_FT
aspirin 81 mg oral delayed release tablet: 1 tab(s) orally once a day  atorvastatin 20 mg oral tablet: 1 tab(s) orally once a day (at bedtime)  Basaglar KwikPen 100 units/mL subcutaneous solution: 8 unit(s) subcutaneous once a day (at bedtime)   cephalexin 500 mg oral capsule: 1 cap(s) orally 4 times a day   dronabinol 2.5 mg oral capsule: 1 cap(s) orally 2 times a day for appetite MDD:5 mg  losartan 50 mg oral tablet: 1 tab(s) orally once a day  metFORMIN 1000 mg oral tablet: 1 tab(s) orally 2 times a day   tamsulosin 0.4 mg oral capsule: 1 cap(s) orally once a day (at bedtime)   Cindyaglroberto Fournier 100 units/mL subcutaneous solution: 8 unit(s) subcutaneous once a day (at bedtime)   metFORMIN 1000 mg oral tablet: 1 tab(s) orally 2 times a day    metFORMIN 1000 mg oral tablet: 1 tab(s) orally 2 times a day    aspirin 81 mg oral delayed release tablet: 1 tab(s) orally once a day  atorvastatin 20 mg oral tablet: 1 tab(s) orally once a day (at bedtime)  Basaglar KwikPen 100 units/mL subcutaneous solution: 6 unit(s) subcutaneous once a day (at bedtime)   metFORMIN 1000 mg oral tablet: 1 tab(s) orally 2 times a day   tamsulosin 0.4 mg oral capsule: 1 cap(s) orally once a day (at bedtime)

## 2022-03-11 NOTE — DISCHARGE NOTE NURSING/CASE MANAGEMENT/SOCIAL WORK - NSDCPEFALRISK_GEN_ALL_CORE
For information on Fall & Injury Prevention, visit: [email or web address] OR  [email or web address] OR  https://www.cdc.gov/steadi/patient.html

## 2022-03-12 LAB
GLUCOSE BLDC GLUCOMTR-MCNC: 114 MG/DL — HIGH (ref 70–99)
GLUCOSE BLDC GLUCOMTR-MCNC: 127 MG/DL — HIGH (ref 70–99)
GLUCOSE BLDC GLUCOMTR-MCNC: 136 MG/DL — HIGH (ref 70–99)
GLUCOSE BLDC GLUCOMTR-MCNC: 156 MG/DL — HIGH (ref 70–99)

## 2022-03-12 RX ADMIN — Medication 81 MILLIGRAM(S): at 11:42

## 2022-03-12 RX ADMIN — TAMSULOSIN HYDROCHLORIDE 0.4 MILLIGRAM(S): 0.4 CAPSULE ORAL at 22:06

## 2022-03-12 RX ADMIN — ATORVASTATIN CALCIUM 20 MILLIGRAM(S): 80 TABLET, FILM COATED ORAL at 22:06

## 2022-03-12 RX ADMIN — HEPARIN SODIUM 5000 UNIT(S): 5000 INJECTION INTRAVENOUS; SUBCUTANEOUS at 17:39

## 2022-03-12 RX ADMIN — Medication 1: at 17:37

## 2022-03-12 NOTE — PHYSICAL THERAPY INITIAL EVALUATION ADULT - PRECAUTIONS/LIMITATIONS, REHAB EVAL
US KIDNEY & BLADDER: Interval placement of a Ramírez catheter which is in place. Small amount of urine is noted within the urinary bladder. Comparison to ultrasound from 3/6/2022, there is redemonstration of  bilateral hydronephrosis, stable in the left kidney and slightly increased in the right kidney. Echogenic kidneys suggesting medical renal disease. Prostatomegaly. US KIDNEY & BLADDER: Interval placement of a Ramírez catheter which is in place. Small amount of urine is noted within the urinary bladder. Comparison to ultrasound from 3/6/2022, there is redemonstration of  bilateral hydronephrosis, stable in the left kidney and slightly increased in the right kidney. Echogenic kidneys suggesting medical renal disease. Prostatomegaly./no known precautions/limitations

## 2022-03-12 NOTE — PHYSICAL THERAPY INITIAL EVALUATION ADULT - PERTINENT HX OF CURRENT PROBLEM, REHAB EVAL
Pt is 71M PMHx DM, bph, had a  home berry, recent ED visit for hematuria, sent today from office for persistent hypotension.

## 2022-03-12 NOTE — PROGRESS NOTE ADULT - ASSESSMENT
72 yo M     h/o   DM, HLD,  BPH  sent home with berry from priro visit  No abdominal pain, no fever, no chest pain, no sob no dysuria.  COVID vaccinated no sick contacts.    card  cindy guevara  and  endo  cindy littlejohn,     CT,  on 3/3/22 bilateral hydro to level of bladder, consistent with bladder outlet obstruction.        *  now  sent to e r by his  pmd.  for   low  sbp and  with   GINA  on iv fuids/  keep berry    * bph  on flomax   *  DM,  known to cindy littlejohn    follow  fs    *  HLD, on asa, lipitor     stop cozaar    * on  dvt ppx  renal  u/s, b/l hydro/  urology dr ballard.  pt  to leave  with  berry  daughter  aware, .  but  states  she is unable to take  care of  pt. and care varghese  is  seeking rehab     per   urology,  d/c  with c/c  berry       < from: CT Abdomen and Pelvis w/ IV Cont (03.03.22 @ 06:09) >  IMPRESSION:  No evidence of malignancy in the chest, abdomen and pelvis.  Moderate bilateral hydroureteronephrosis to the level of the urinary   bladder, which is distended with urine likely secondary to bladder outlet   obstruction.  --- End of Report ---  < end of copied text >       rad< from: Transthoracic Echocardiogram (02.22.22 @ 06:55) >  Conclusions:  1. Normal left ventricular systolic function. LVEF using  biplane Marroquin's is 71%. No segmental wall motion  abnormalities. There is a sigmoid septum measuring  1.7cm.Normal diastolic function  2. Normal right atrium. Normal right ventricular size and  function.Normal tricuspid valve. Mild tricuspid  regurgitation.Estimated pulmonary artery systolic pressure  equals 28 mm Hg, assuming right atrial pressure equals 3  mm Hg, consistent with normal pulmonary pressures.  3. No pericardial effusion seen.  < end of copied text >

## 2022-03-12 NOTE — PHYSICAL THERAPY INITIAL EVALUATION ADULT - ADDITIONAL COMMENTS
Pt resides in private home with dtr, 7 steps to enter, one level within. PTA Independent with mobility and ADL's, owns no DME.

## 2022-03-12 NOTE — PHYSICAL THERAPY INITIAL EVALUATION ADULT - GENERAL OBSERVATIONS, REHAB EVAL
received semisupine in bed, A&OX4, following all commands, pleasant & eager to participate, a/w hypotension, BS Reviewed

## 2022-03-13 ENCOUNTER — FORM ENCOUNTER (OUTPATIENT)
Age: 72
End: 2022-03-13

## 2022-03-13 VITALS
HEART RATE: 66 BPM | SYSTOLIC BLOOD PRESSURE: 115 MMHG | OXYGEN SATURATION: 98 % | RESPIRATION RATE: 18 BRPM | TEMPERATURE: 98 F | DIASTOLIC BLOOD PRESSURE: 72 MMHG

## 2022-03-13 DIAGNOSIS — E11.9 TYPE 2 DIABETES MELLITUS WITHOUT COMPLICATIONS: ICD-10-CM

## 2022-03-13 DIAGNOSIS — N13.9 OBSTRUCTIVE AND REFLUX UROPATHY, UNSPECIFIED: ICD-10-CM

## 2022-03-13 DIAGNOSIS — E78.5 HYPERLIPIDEMIA, UNSPECIFIED: ICD-10-CM

## 2022-03-13 LAB
A1C WITH ESTIMATED AVERAGE GLUCOSE RESULT: 10.6 % — HIGH (ref 4–5.6)
ESTIMATED AVERAGE GLUCOSE: 258 MG/DL — HIGH (ref 68–114)
GLUCOSE BLDC GLUCOMTR-MCNC: 121 MG/DL — HIGH (ref 70–99)
GLUCOSE BLDC GLUCOMTR-MCNC: 159 MG/DL — HIGH (ref 70–99)

## 2022-03-13 PROCEDURE — 82435 ASSAY OF BLOOD CHLORIDE: CPT

## 2022-03-13 PROCEDURE — 85730 THROMBOPLASTIN TIME PARTIAL: CPT

## 2022-03-13 PROCEDURE — 87040 BLOOD CULTURE FOR BACTERIA: CPT

## 2022-03-13 PROCEDURE — 84295 ASSAY OF SERUM SODIUM: CPT

## 2022-03-13 PROCEDURE — 82803 BLOOD GASES ANY COMBINATION: CPT

## 2022-03-13 PROCEDURE — 99285 EMERGENCY DEPT VISIT HI MDM: CPT | Mod: 25

## 2022-03-13 PROCEDURE — 96374 THER/PROPH/DIAG INJ IV PUSH: CPT

## 2022-03-13 PROCEDURE — 82330 ASSAY OF CALCIUM: CPT

## 2022-03-13 PROCEDURE — 97161 PT EVAL LOW COMPLEX 20 MIN: CPT

## 2022-03-13 PROCEDURE — 96361 HYDRATE IV INFUSION ADD-ON: CPT

## 2022-03-13 PROCEDURE — 82947 ASSAY GLUCOSE BLOOD QUANT: CPT

## 2022-03-13 PROCEDURE — 83036 HEMOGLOBIN GLYCOSYLATED A1C: CPT

## 2022-03-13 PROCEDURE — U0005: CPT

## 2022-03-13 PROCEDURE — 85027 COMPLETE CBC AUTOMATED: CPT

## 2022-03-13 PROCEDURE — 87086 URINE CULTURE/COLONY COUNT: CPT

## 2022-03-13 PROCEDURE — U0003: CPT

## 2022-03-13 PROCEDURE — 80053 COMPREHEN METABOLIC PANEL: CPT

## 2022-03-13 PROCEDURE — 81001 URINALYSIS AUTO W/SCOPE: CPT

## 2022-03-13 PROCEDURE — 83605 ASSAY OF LACTIC ACID: CPT

## 2022-03-13 PROCEDURE — 85018 HEMOGLOBIN: CPT

## 2022-03-13 PROCEDURE — 85610 PROTHROMBIN TIME: CPT

## 2022-03-13 PROCEDURE — 80048 BASIC METABOLIC PNL TOTAL CA: CPT

## 2022-03-13 PROCEDURE — 85014 HEMATOCRIT: CPT

## 2022-03-13 PROCEDURE — 71046 X-RAY EXAM CHEST 2 VIEWS: CPT

## 2022-03-13 PROCEDURE — 82962 GLUCOSE BLOOD TEST: CPT

## 2022-03-13 PROCEDURE — 84132 ASSAY OF SERUM POTASSIUM: CPT

## 2022-03-13 PROCEDURE — 85025 COMPLETE CBC W/AUTO DIFF WBC: CPT

## 2022-03-13 PROCEDURE — 76770 US EXAM ABDO BACK WALL COMP: CPT

## 2022-03-13 RX ORDER — INSULIN GLARGINE 100 [IU]/ML
6 INJECTION, SOLUTION SUBCUTANEOUS
Qty: 1 | Refills: 2
Start: 2022-03-13 | End: 2022-06-10

## 2022-03-13 RX ORDER — ASPIRIN/CALCIUM CARB/MAGNESIUM 324 MG
1 TABLET ORAL
Qty: 30 | Refills: 0
Start: 2022-03-13 | End: 2022-04-11

## 2022-03-13 RX ORDER — TAMSULOSIN HYDROCHLORIDE 0.4 MG/1
1 CAPSULE ORAL
Qty: 30 | Refills: 0
Start: 2022-03-13 | End: 2022-04-11

## 2022-03-13 RX ORDER — ATORVASTATIN CALCIUM 80 MG/1
1 TABLET, FILM COATED ORAL
Qty: 30 | Refills: 0
Start: 2022-03-13 | End: 2022-04-11

## 2022-03-13 RX ADMIN — Medication 81 MILLIGRAM(S): at 11:40

## 2022-03-13 RX ADMIN — HEPARIN SODIUM 5000 UNIT(S): 5000 INJECTION INTRAVENOUS; SUBCUTANEOUS at 06:03

## 2022-03-13 RX ADMIN — Medication 1: at 12:36

## 2022-03-13 NOTE — CONSULT NOTE ADULT - PROBLEM SELECTOR RECOMMENDATION 9
Will continue current insulin regimen for now. Will continue monitoring FS, log, and FU.  Patient with obstruction, GINA, recent weight loss, low insulin requirement at this time. Suggest DC on the following DM regimen:  -Lantus 6u at bedtime ONLY if blood sugars > 150  -Discontinue all hypoglycemic agents, Metformin not recommended for him  -Endo FU 4 weeks  Discussed plan with patient and daughter at bedside. Patient counseled for compliance with consistent low carb diet.

## 2022-03-13 NOTE — PROGRESS NOTE ADULT - SUBJECTIVE AND OBJECTIVE BOX
CARDIOLOGY     PROGRESS  NOTE   ________________________________________________    CHIEF COMPLAINT:Patient is a 71y old  Male who presents with a chief complaint of vickie (11 Mar 2022 07:59)  doing better, bp is improving.  	  REVIEW OF SYSTEMS:  CONSTITUTIONAL: No fever, weight loss, or fatigue  EYES: No eye pain, visual disturbances, or discharge  ENT:  No difficulty hearing, tinnitus, vertigo; No sinus or throat pain  NECK: No pain or stiffness  RESPIRATORY: No cough, wheezing, chills or hemoptysis; No Shortness of Breath  CARDIOVASCULAR: No chest pain, palpitations, passing out, dizziness, or leg swelling  GASTROINTESTINAL: No abdominal or epigastric pain. No nausea, vomiting, or hematemesis; No diarrhea or constipation. No melena or hematochezia.  GENITOURINARY: No dysuria, frequency, hematuria, or incontinence  NEUROLOGICAL: No headaches, memory loss, loss of strength, numbness, or tremors  SKIN: No itching, burning, rashes, or lesions   LYMPH Nodes: No enlarged glands  ENDOCRINE: No heat or cold intolerance; No hair loss  MUSCULOSKELETAL: No joint pain or swelling; No muscle, back, or extremity pain  PSYCHIATRIC: No depression, anxiety, mood swings, or difficulty sleeping  HEME/LYMPH: No easy bruising, or bleeding gums  ALLERGY AND IMMUNOLOGIC: No hives or eczema	    [ ] All others negative	  [ ] Unable to obtain    PHYSICAL EXAM:  T(C): 37.1 (03-11-22 @ 08:12), Max: 37.1 (03-11-22 @ 08:12)  HR: 71 (03-11-22 @ 08:12) (65 - 72)  BP: 123/75 (03-11-22 @ 08:12) (110/71 - 123/75)  RR: 18 (03-11-22 @ 08:12) (18 - 20)  SpO2: 98% (03-11-22 @ 08:12) (97% - 99%)  Wt(kg): --  I&O's Summary    10 Mar 2022 07:01  -  11 Mar 2022 07:00  --------------------------------------------------------  IN: 1490 mL / OUT: 2800 mL / NET: -1310 mL    11 Mar 2022 07:01  -  11 Mar 2022 09:08  --------------------------------------------------------  IN: 240 mL / OUT: 0 mL / NET: 240 mL        Appearance: Normal	  HEENT:   Normal oral mucosa, PERRL, EOMI	  Lymphatic: No lymphadenopathy  Cardiovascular: Normal S1 S2, No JVD, + murmurs, No edema  Respiratory: Lungs clear to auscultation	  Psychiatry: A & O x 3, Mood & affect appropriate  Gastrointestinal:  Soft, Non-tender, + BS	  Skin: No rashes, No ecchymoses, No cyanosis	  Neurologic: Non-focal  Extremities: Normal range of motion, No clubbing, cyanosis or edema  Vascular: Peripheral pulses palpable 2+ bilaterally    MEDICATIONS  (STANDING):  cefTRIAXone   IVPB 1000 milliGRAM(s) IV Intermittent every 24 hours  dextrose 40% Gel 15 Gram(s) Oral once  dextrose 5%. 1000 milliLiter(s) (50 mL/Hr) IV Continuous <Continuous>  dextrose 5%. 1000 milliLiter(s) (100 mL/Hr) IV Continuous <Continuous>  dextrose 50% Injectable 25 Gram(s) IV Push once  dextrose 50% Injectable 12.5 Gram(s) IV Push once  dextrose 50% Injectable 25 Gram(s) IV Push once  glucagon  Injectable 1 milliGRAM(s) IntraMuscular once  heparin   Injectable 5000 Unit(s) SubCutaneous every 12 hours  insulin lispro (ADMELOG) corrective regimen sliding scale   SubCutaneous three times a day before meals  potassium chloride    Tablet ER 40 milliEquivalent(s) Oral once  sodium chloride 0.9%. 1000 milliLiter(s) (80 mL/Hr) IV Continuous <Continuous>      TELEMETRY: 	    ECG:  	  RADIOLOGY:  OTHER: 	  	  LABS:	 	    CARDIAC MARKERS:                                14.9   6.55  )-----------( 143      ( 10 Mar 2022 07:18 )             44.9     03-11    137  |  99  |  16  ----------------------------<  119<H>  3.4<L>   |  24  |  1.21    Ca    8.7      11 Mar 2022 06:58    TPro  6.9  /  Alb  3.9  /  TBili  1.0  /  DBili  x   /  AST  20  /  ALT  14  /  AlkPhos  66  03-09    proBNP:   Lipid Profile: Cholesterol 196  LDL --  HDL 69  TG 84  Cholesterol 217  LDL --  HDL 76  TG 92    HgA1c:   TSH: Thyroid Stimulating Hormone, Serum: 0.94 uIU/mL (03-03 @ 06:26)  Thyroid Stimulating Hormone, Serum: 0.76 uIU/mL (02-21 @ 09:06)    PT/INR - ( 09 Mar 2022 13:29 )   PT: 11.5 sec;   INR: 0.99 ratio         PTT - ( 09 Mar 2022 13:29 )  PTT:36.7 sec      Assessment and plan  ---------------------------  71yr M h/o   DM ,  bph , had a  home [facility] recent ED visit for hematuria  sent today from office for persistent hypotension.   denies  fever chills/  cp, sob, abd pain, no diarrhea, no vomiting.  pt denies of any chest pain/ sob.  while in ER pt with decrease bp started on ivf, pt was on losartan and was dcd with no bp meds. pt with echo with  normal EF and was scheduled for stress test.   pt with urinary retention and bl hydronephrosis  post obstructive diuresis increase fluid  fu lytes  repeat ecg, pt with abnormal ecg but normal echo  r/o sepsis  dvt prophylaxis  bp has improved  oob to chair  awaiting urine  cultures    	        
           CARDIOLOGY     PROGRESS  NOTE   ________________________________________________    CHIEF COMPLAINT:Patient is a 71y old  Male who presents with a chief complaint of vickie (13 Mar 2022 11:26)  doing well.  	  REVIEW OF SYSTEMS:  CONSTITUTIONAL: No fever, weight loss, or fatigue  EYES: No eye pain, visual disturbances, or discharge  ENT:  No difficulty hearing, tinnitus, vertigo; No sinus or throat pain  NECK: No pain or stiffness  RESPIRATORY: No cough, wheezing, chills or hemoptysis; No Shortness of Breath  CARDIOVASCULAR: No chest pain, palpitations, passing out, dizziness, or leg swelling  GASTROINTESTINAL: No abdominal or epigastric pain. No nausea, vomiting, or hematemesis; No diarrhea or constipation. No melena or hematochezia.  GENITOURINARY: No dysuria, frequency, hematuria, or incontinence  NEUROLOGICAL: No headaches, memory loss, loss of strength, numbness, or tremors  SKIN: No itching, burning, rashes, or lesions   LYMPH Nodes: No enlarged glands  ENDOCRINE: No heat or cold intolerance; No hair loss  MUSCULOSKELETAL: No joint pain or swelling; No muscle, back, or extremity pain  PSYCHIATRIC: No depression, anxiety, mood swings, or difficulty sleeping  HEME/LYMPH: No easy bruising, or bleeding gums  ALLERGY AND IMMUNOLOGIC: No hives or eczema	    [ ] All others negative	  [ ] Unable to obtain    PHYSICAL EXAM:  T(C): 36.8 (03-13-22 @ 05:41), Max: 36.8 (03-13-22 @ 05:41)  HR: 66 (03-13-22 @ 05:41) (66 - 69)  BP: 112/72 (03-13-22 @ 05:41) (106/66 - 113/72)  RR: 18 (03-13-22 @ 05:41) (18 - 18)  SpO2: 98% (03-13-22 @ 05:41) (96% - 98%)  Wt(kg): --  I&O's Summary    13 Mar 2022 07:01  -  13 Mar 2022 12:43  --------------------------------------------------------  IN: 0 mL / OUT: 1700 mL / NET: -1700 mL        Appearance: Normal	  HEENT:   Normal oral mucosa, PERRL, EOMI	  Lymphatic: No lymphadenopathy  Cardiovascular: Normal S1 S2, No JVD, No murmurs, No edema  Respiratory: Lungs clear to auscultation	  Psychiatry: A & O x 3, Mood & affect appropriate  Gastrointestinal:  Soft, Non-tender, + BS	  Skin: No rashes, No ecchymoses, No cyanosis	  Neurologic: Non-focal  Extremities: Normal range of motion, No clubbing, cyanosis or edema  Vascular: Peripheral pulses palpable 2+ bilaterally    MEDICATIONS  (STANDING):  aspirin enteric coated 81 milliGRAM(s) Oral daily  atorvastatin 20 milliGRAM(s) Oral at bedtime  dextrose 40% Gel 15 Gram(s) Oral once  dextrose 5%. 1000 milliLiter(s) (50 mL/Hr) IV Continuous <Continuous>  dextrose 5%. 1000 milliLiter(s) (100 mL/Hr) IV Continuous <Continuous>  dextrose 50% Injectable 25 Gram(s) IV Push once  dextrose 50% Injectable 12.5 Gram(s) IV Push once  dextrose 50% Injectable 25 Gram(s) IV Push once  glucagon  Injectable 1 milliGRAM(s) IntraMuscular once  heparin   Injectable 5000 Unit(s) SubCutaneous every 12 hours  insulin lispro (ADMELOG) corrective regimen sliding scale   SubCutaneous three times a day before meals  tamsulosin 0.4 milliGRAM(s) Oral at bedtime      TELEMETRY: 	    ECG:  	  RADIOLOGY:  OTHER: 	  	  LABS:	 	    CARDIAC MARKERS:                  proBNP:   Lipid Profile: Cholesterol 196  LDL --  HDL 69  TG 84  Cholesterol 217  LDL --  HDL 76  TG 92    HgA1c:   TSH: Thyroid Stimulating Hormone, Serum: 0.94 uIU/mL (03-03 @ 06:26)  Thyroid Stimulating Hormone, Serum: 0.76 uIU/mL (02-21 @ 09:06)          Assessment and plan  ---------------------------  71yr M h/o   DM ,  bph , had a  home foely, w recent ED visit for hematuria  sent today from office for persistent hypotension.   denies  fever chills/  cp, sob, abd pain, no diarrhea, no vomiting.  pt denies of any chest pain/ sob.  while in ER pt with decrease bp started on ivf, pt was on losartan and was dcd with no bp meds. pt with echo with  normal EF and was scheduled for stress test.   pt with urinary retention and bl hydronephrosis  post obstructive diuresis increase fluid  fu lytes  repeat ecg, pt with abnormal ecg but normal echo  r/o sepsis  dvt prophylaxis  bp has improved  oob to chair  awaiting urine  cultures  stress test as out pt  awaiting dc planninng    	        
     afberile  REVIEW OF SYSTEMS:  GEN: no fever,    no chills  RESP: no SOB,   no cough  CVS: no chest pain,   no palpitations  GI: no abdominal pain,   no nausea,   no vomiting,   no constipation,   no diarrhea  : no dysuria,   no frequency  NEURO: no headache,   no dizziness  PSYCH: no depression,   not anxious  Derm : no rash    MEDICATIONS  (STANDING):  aspirin enteric coated 81 milliGRAM(s) Oral daily  atorvastatin 20 milliGRAM(s) Oral at bedtime  dextrose 40% Gel 15 Gram(s) Oral once  dextrose 5%. 1000 milliLiter(s) (50 mL/Hr) IV Continuous <Continuous>  dextrose 5%. 1000 milliLiter(s) (100 mL/Hr) IV Continuous <Continuous>  dextrose 50% Injectable 25 Gram(s) IV Push once  dextrose 50% Injectable 12.5 Gram(s) IV Push once  dextrose 50% Injectable 25 Gram(s) IV Push once  glucagon  Injectable 1 milliGRAM(s) IntraMuscular once  heparin   Injectable 5000 Unit(s) SubCutaneous every 12 hours  insulin lispro (ADMELOG) corrective regimen sliding scale   SubCutaneous three times a day before meals  tamsulosin 0.4 milliGRAM(s) Oral at bedtime    MEDICATIONS  (PRN):      Vital Signs Last 24 Hrs  T(C): 36.8 (13 Mar 2022 05:41), Max: 36.8 (13 Mar 2022 05:41)  T(F): 98.2 (13 Mar 2022 05:41), Max: 98.2 (13 Mar 2022 05:41)  HR: 66 (13 Mar 2022 05:41) (66 - 70)  BP: 112/72 (13 Mar 2022 05:41) (106/66 - 116/78)  BP(mean): --  RR: 18 (13 Mar 2022 05:41) (18 - 18)  SpO2: 98% (13 Mar 2022 05:41) (96% - 99%)  CAPILLARY BLOOD GLUCOSE      POCT Blood Glucose.: 121 mg/dL (13 Mar 2022 08:18)  POCT Blood Glucose.: 114 mg/dL (12 Mar 2022 22:39)  POCT Blood Glucose.: 156 mg/dL (12 Mar 2022 17:09)  POCT Blood Glucose.: 136 mg/dL (12 Mar 2022 11:43)    I&O's Summary    13 Mar 2022 07:01  -  13 Mar 2022 11:26  --------------------------------------------------------  IN: 0 mL / OUT: 1700 mL / NET: -1700 mL        PHYSICAL EXAM:  HEAD:  Atraumatic, Normocephalic  NECK: Supple, No   JVD  CHEST/LUNG:   no     rales,     no,    rhonchi  HEART: Regular rate and rhythm;         murmur  ABDOMEN: Soft, Nontender, ;   EXTREMITIES:       no edema  NEUROLOGY:  alert    LABS:                          Thyroid Stimulating Hormone, Serum: 0.94 uIU/mL (03-03 @ 06:26)          Consultant(s) Notes Reviewed:      Care Discussed with Consultants/Other Providers:    
           CARDIOLOGY     PROGRESS  NOTE   ________________________________________________    CHIEF COMPLAINT:Patient is a 71y old  Male who presents with a chief complaint of vickie (12 Mar 2022 08:21)  no complain.  	  REVIEW OF SYSTEMS:  CONSTITUTIONAL: No fever, weight loss, or fatigue  EYES: No eye pain, visual disturbances, or discharge  ENT:  No difficulty hearing, tinnitus, vertigo; No sinus or throat pain  NECK: No pain or stiffness  RESPIRATORY: No cough, wheezing, chills or hemoptysis; No Shortness of Breath  CARDIOVASCULAR: No chest pain, palpitations, passing out, dizziness, or leg swelling  GASTROINTESTINAL: No abdominal or epigastric pain. No nausea, vomiting, or hematemesis; No diarrhea or constipation. No melena or hematochezia.  GENITOURINARY: No dysuria, frequency, hematuria, or incontinence  NEUROLOGICAL: No headaches, memory loss, loss of strength, numbness, or tremors  SKIN: No itching, burning, rashes, or lesions   LYMPH Nodes: No enlarged glands  ENDOCRINE: No heat or cold intolerance; No hair loss  MUSCULOSKELETAL: No joint pain or swelling; No muscle, back, or extremity pain  PSYCHIATRIC: No depression, anxiety, mood swings, or difficulty sleeping  HEME/LYMPH: No easy bruising, or bleeding gums  ALLERGY AND IMMUNOLOGIC: No hives or eczema	    [ ] All others negative	  [ ] Unable to obtain    PHYSICAL EXAM:  T(C): 36.8 (03-11-22 @ 20:23), Max: 36.8 (03-11-22 @ 12:09)  HR: 76 (03-11-22 @ 20:23) (65 - 83)  BP: 110/72 (03-11-22 @ 20:23) (110/72 - 155/90)  RR: 18 (03-11-22 @ 20:23) (18 - 18)  SpO2: 96% (03-11-22 @ 20:23) (96% - 99%)  Wt(kg): --  I&O's Summary    11 Mar 2022 07:01  -  12 Mar 2022 07:00  --------------------------------------------------------  IN: 480 mL / OUT: 1200 mL / NET: -720 mL        Appearance: Normal	  HEENT:   Normal oral mucosa, PERRL, EOMI	  Lymphatic: No lymphadenopathy  Cardiovascular: Normal S1 S2, No JVD, + murmurs, No edema  Respiratory: Lungs clear to auscultation	  Psychiatry: A & O x 3, Mood & affect appropriate  Gastrointestinal:  Soft, Non-tender, + BS	  Skin: No rashes, No ecchymoses, No cyanosis	  Neurologic: Non-focal  Extremities: Normal range of motion, No clubbing, cyanosis or edema  Vascular: Peripheral pulses palpable 2+ bilaterally    MEDICATIONS  (STANDING):  aspirin enteric coated 81 milliGRAM(s) Oral daily  atorvastatin 20 milliGRAM(s) Oral at bedtime  dextrose 40% Gel 15 Gram(s) Oral once  dextrose 5%. 1000 milliLiter(s) (50 mL/Hr) IV Continuous <Continuous>  dextrose 5%. 1000 milliLiter(s) (100 mL/Hr) IV Continuous <Continuous>  dextrose 50% Injectable 25 Gram(s) IV Push once  dextrose 50% Injectable 12.5 Gram(s) IV Push once  dextrose 50% Injectable 25 Gram(s) IV Push once  glucagon  Injectable 1 milliGRAM(s) IntraMuscular once  heparin   Injectable 5000 Unit(s) SubCutaneous every 12 hours  insulin lispro (ADMELOG) corrective regimen sliding scale   SubCutaneous three times a day before meals  tamsulosin 0.4 milliGRAM(s) Oral at bedtime      TELEMETRY: 	    ECG:  	  RADIOLOGY:  OTHER: 	  	  LABS:	 	    CARDIAC MARKERS:            03-11    137  |  99  |  16  ----------------------------<  119<H>  3.4<L>   |  24  |  1.21    Ca    8.7      11 Mar 2022 06:58      proBNP:   Lipid Profile: Cholesterol 196  LDL --  HDL 69  TG 84  Cholesterol 217  LDL --  HDL 76  TG 92    HgA1c:   TSH: Thyroid Stimulating Hormone, Serum: 0.94 uIU/mL (03-03 @ 06:26)  Thyroid Stimulating Hormone, Serum: 0.76 uIU/mL (02-21 @ 09:06)          Assessment and plan  ---------------------------  71yr M h/o   DM ,  bph , had a  home foely, w recent ED visit for hematuria  sent today from office for persistent hypotension.   denies  fever chills/  cp, sob, abd pain, no diarrhea, no vomiting.  pt denies of any chest pain/ sob.  while in ER pt with decrease bp started on ivf, pt was on losartan and was dcd with no bp meds. pt with echo with  normal EF and was scheduled for stress test.   pt with urinary retention and bl hydronephrosis  post obstructive diuresis increase fluid  fu lytes  repeat ecg, pt with abnormal ecg but normal echo  r/o sepsis  dvt prophylaxis  bp has improved  oob to chair  awaiting urine  cultures  stress test as out pt    	        
           CARDIOLOGY     PROGRESS  NOTE   ________________________________________________    CHIEF COMPLAINT:Patient is a 71y old  Male who presents with a chief complaint of vickie (09 Mar 2022 19:20)  doing better.  	  REVIEW OF SYSTEMS:  CONSTITUTIONAL: No fever, weight loss, or fatigue  EYES: No eye pain, visual disturbances, or discharge  ENT:  No difficulty hearing, tinnitus, vertigo; No sinus or throat pain  NECK: No pain or stiffness  RESPIRATORY: No cough, wheezing, chills or hemoptysis; No Shortness of Breath  CARDIOVASCULAR: No chest pain, palpitations, passing out, dizziness, or leg swelling  GASTROINTESTINAL: No abdominal or epigastric pain. No nausea, vomiting, or hematemesis; No diarrhea or constipation. No melena or hematochezia.  GENITOURINARY: No dysuria, frequency, hematuria, or incontinence  NEUROLOGICAL: No headaches, memory loss, loss of strength, numbness, or tremors  SKIN: No itching, burning, rashes, or lesions   LYMPH Nodes: No enlarged glands  ENDOCRINE: No heat or cold intolerance; No hair loss  MUSCULOSKELETAL: No joint pain or swelling; No muscle, back, or extremity pain  PSYCHIATRIC: No depression, anxiety, mood swings, or difficulty sleeping  HEME/LYMPH: No easy bruising, or bleeding gums  ALLERGY AND IMMUNOLOGIC: No hives or eczema	    [ ] All others negative	  [ ] Unable to obtain    PHYSICAL EXAM:  T(C): 36.4 (03-10-22 @ 08:43), Max: 36.7 (03-09-22 @ 21:22)  HR: 90 (03-10-22 @ 08:43) (65 - 90)  BP: 112/71 (03-10-22 @ 08:43) (86/54 - 119/66)  RR: 18 (03-10-22 @ 08:43) (14 - 19)  SpO2: 98% (03-10-22 @ 08:43) (95% - 100%)  Wt(kg): --  I&O's Summary    09 Mar 2022 07:01  -  10 Mar 2022 07:00  --------------------------------------------------------  IN: 370 mL / OUT: 3100 mL / NET: -2730 mL        Appearance: Normal	  HEENT:   Normal oral mucosa, PERRL, EOMI	  Lymphatic: No lymphadenopathy  Cardiovascular: Normal S1 S2, No JVD, +murmurs, No edema  Respiratory: Lungs clear to auscultation	  Psychiatry: A & O x 3, Mood & affect appropriate  Gastrointestinal:  Soft, Non-tender, + BS	  Skin: No rashes, No ecchymoses, No cyanosis	  Neurologic: Non-focal  Extremities: Normal range of motion, No clubbing, cyanosis or edema  Vascular: Peripheral pulses palpable 2+ bilaterally    MEDICATIONS  (STANDING):  cefTRIAXone   IVPB 1000 milliGRAM(s) IV Intermittent every 24 hours  dextrose 40% Gel 15 Gram(s) Oral once  dextrose 5%. 1000 milliLiter(s) (50 mL/Hr) IV Continuous <Continuous>  dextrose 5%. 1000 milliLiter(s) (100 mL/Hr) IV Continuous <Continuous>  dextrose 50% Injectable 25 Gram(s) IV Push once  dextrose 50% Injectable 12.5 Gram(s) IV Push once  dextrose 50% Injectable 25 Gram(s) IV Push once  glucagon  Injectable 1 milliGRAM(s) IntraMuscular once  heparin   Injectable 5000 Unit(s) SubCutaneous every 12 hours  insulin lispro (ADMELOG) corrective regimen sliding scale   SubCutaneous three times a day before meals  sodium chloride 0.9%. 1000 milliLiter(s) (80 mL/Hr) IV Continuous <Continuous>      TELEMETRY: 	    ECG:  	  RADIOLOGY:  OTHER: 	  	  LABS:	 	    CARDIAC MARKERS:                                14.9   6.55  )-----------( 143      ( 10 Mar 2022 07:18 )             44.9     03-10    136  |  99  |  27<H>  ----------------------------<  78  3.8   |  22  |  1.72<H>    Ca    8.7      10 Mar 2022 07:18    TPro  6.9  /  Alb  3.9  /  TBili  1.0  /  DBili  x   /  AST  20  /  ALT  14  /  AlkPhos  66  03-09    proBNP:   Lipid Profile: Cholesterol 196  LDL --  HDL 69  TG 84  Cholesterol 217  LDL --  HDL 76  TG 92    HgA1c:   TSH: Thyroid Stimulating Hormone, Serum: 0.94 uIU/mL (03-03 @ 06:26)  Thyroid Stimulating Hormone, Serum: 0.76 uIU/mL (02-21 @ 09:06)    PT/INR - ( 09 Mar 2022 13:29 )   PT: 11.5 sec;   INR: 0.99 ratio         PTT - ( 09 Mar 2022 13:29 )  PTT:36.7 sec  Culture - Urine (03.08.22 @ 08:18)    Specimen Source: Clean Catch Clean Catch (Midstream)    Culture Results:   No growth        Assessment and plan  ---------------------------  71yr M h/o   DM ,  bph , had a  home enricoely w recent ED visit for hematuria  sent today from office for persistent hypotension.   denies  fever chills/  cp, sob, abd pain, no diarrhea, no vomiting.  pt denies of any chest pain/ sob.  while in ER pt with decrease bp started on ivf, pt was on losartan and was dcd with no bp meds. pt with echo with  normal EF and was scheduled for stress test.   pt with urinary retention and bl hydronephrosis  post obstructive diuresis increase fluid  fu lytes  repeat ecg, pt with abnormal ecg but normal echo  r/o sepsis  dvt prophylaxis  bp has improved  urology follow up    	        
    afberile  REVIEW OF SYSTEMS:  GEN: no fever,    no chills  RESP: no SOB,   no cough  CVS: no chest pain,   no palpitations  GI: no abdominal pain,   no nausea,   no vomiting,   no constipation,   no diarrhea  : no dysuria,   no frequency  NEURO: no headache,   no dizziness  PSYCH: no depression,   not anxious  Derm : no rash    MEDICATIONS  (STANDING):  aspirin enteric coated 81 milliGRAM(s) Oral daily  atorvastatin 20 milliGRAM(s) Oral at bedtime  dextrose 40% Gel 15 Gram(s) Oral once  dextrose 5%. 1000 milliLiter(s) (50 mL/Hr) IV Continuous <Continuous>  dextrose 5%. 1000 milliLiter(s) (100 mL/Hr) IV Continuous <Continuous>  dextrose 50% Injectable 25 Gram(s) IV Push once  dextrose 50% Injectable 12.5 Gram(s) IV Push once  dextrose 50% Injectable 25 Gram(s) IV Push once  glucagon  Injectable 1 milliGRAM(s) IntraMuscular once  heparin   Injectable 5000 Unit(s) SubCutaneous every 12 hours  insulin lispro (ADMELOG) corrective regimen sliding scale   SubCutaneous three times a day before meals  tamsulosin 0.4 milliGRAM(s) Oral at bedtime    MEDICATIONS  (PRN):      Vital Signs Last 24 Hrs  T(C): 36.8 (11 Mar 2022 20:23), Max: 36.8 (11 Mar 2022 12:09)  T(F): 98.2 (11 Mar 2022 20:23), Max: 98.3 (11 Mar 2022 12:09)  HR: 76 (11 Mar 2022 20:23) (65 - 83)  BP: 110/72 (11 Mar 2022 20:23) (110/72 - 155/90)  BP(mean): --  RR: 18 (11 Mar 2022 20:23) (18 - 18)  SpO2: 96% (11 Mar 2022 20:23) (96% - 99%)  CAPILLARY BLOOD GLUCOSE      POCT Blood Glucose.: 127 mg/dL (12 Mar 2022 08:00)  POCT Blood Glucose.: 128 mg/dL (11 Mar 2022 22:24)  POCT Blood Glucose.: 136 mg/dL (11 Mar 2022 17:18)  POCT Blood Glucose.: 112 mg/dL (11 Mar 2022 11:44)  POCT Blood Glucose.: 101 mg/dL (11 Mar 2022 08:30)    I&O's Summary    11 Mar 2022 07:01  -  12 Mar 2022 07:00  --------------------------------------------------------  IN: 480 mL / OUT: 1200 mL / NET: -720 mL        PHYSICAL EXAM:  HEAD:  Atraumatic, Normocephalic  NECK: Supple, No   JVD  CHEST/LUNG:   no     rales,     no,    rhonchi  HEART: Regular rate and rhythm;         murmur  ABDOMEN: Soft, Nontender, ;   EXTREMITIES:    no    edema  NEUROLOGY:  alert    LABS:    03-11    137  |  99  |  16  ----------------------------<  119<H>  3.4<L>   |  24  |  1.21    Ca    8.7      11 Mar 2022 06:58                      Thyroid Stimulating Hormone, Serum: 0.94 uIU/mL (03-03 @ 06:26)          Consultant(s) Notes Reviewed:      Care Discussed with Consultants/Other Providers:    
    afberile  REVIEW OF SYSTEMS:  GEN: no fever,    no chills  RESP: no SOB,   no cough  CVS: no chest pain,   no palpitations  GI: no abdominal pain,   no nausea,   no vomiting,   no constipation,   no diarrhea  : no dysuria,   no frequency  NEURO: no headache,   no dizziness  PSYCH: no depression,   not anxious  Derm : no rash    MEDICATIONS  (STANDING):  cefTRIAXone   IVPB 1000 milliGRAM(s) IV Intermittent every 24 hours  dextrose 40% Gel 15 Gram(s) Oral once  dextrose 5%. 1000 milliLiter(s) (50 mL/Hr) IV Continuous <Continuous>  dextrose 5%. 1000 milliLiter(s) (100 mL/Hr) IV Continuous <Continuous>  dextrose 50% Injectable 25 Gram(s) IV Push once  dextrose 50% Injectable 12.5 Gram(s) IV Push once  dextrose 50% Injectable 25 Gram(s) IV Push once  glucagon  Injectable 1 milliGRAM(s) IntraMuscular once  heparin   Injectable 5000 Unit(s) SubCutaneous every 12 hours  insulin lispro (ADMELOG) corrective regimen sliding scale   SubCutaneous three times a day before meals  sodium chloride 0.9%. 1000 milliLiter(s) (80 mL/Hr) IV Continuous <Continuous>    MEDICATIONS  (PRN):      Vital Signs Last 24 Hrs  T(C): 36.4 (10 Mar 2022 08:43), Max: 36.7 (09 Mar 2022 21:22)  T(F): 97.5 (10 Mar 2022 08:43), Max: 98.1 (09 Mar 2022 21:22)  HR: 90 (10 Mar 2022 08:43) (65 - 90)  BP: 112/71 (10 Mar 2022 08:43) (86/54 - 119/66)  BP(mean): 75 (09 Mar 2022 17:29) (67 - 75)  RR: 18 (10 Mar 2022 08:43) (14 - 19)  SpO2: 98% (10 Mar 2022 08:43) (95% - 100%)  CAPILLARY BLOOD GLUCOSE      POCT Blood Glucose.: 106 mg/dL (10 Mar 2022 08:33)  POCT Blood Glucose.: 149 mg/dL (09 Mar 2022 21:14)  POCT Blood Glucose.: 102 mg/dL (09 Mar 2022 18:26)  POCT Blood Glucose.: 76 mg/dL (09 Mar 2022 17:27)  POCT Blood Glucose.: 113 mg/dL (09 Mar 2022 13:06)    I&O's Summary    09 Mar 2022 07:01  -  10 Mar 2022 07:00  --------------------------------------------------------  IN: 370 mL / OUT: 3100 mL / NET: -2730 mL        PHYSICAL EXAM:  HEAD:  Atraumatic, Normocephalic  NECK: Supple, No   JVD  CHEST/LUNG:   no     rales,     no,    rhonchi  HEART: Regular rate and rhythm;         murmur  ABDOMEN: Soft, Nontender, ;   EXTREMITIES:    no    edema  NEUROLOGY:  alert    LABS:                        14.9   6.55  )-----------( 143      ( 10 Mar 2022 07:18 )             44.9     03-10    136  |  99  |  27<H>  ----------------------------<  78  3.8   |  22  |  1.72<H>    Ca    8.7      10 Mar 2022 07:18    TPro  6.9  /  Alb  3.9  /  TBili  1.0  /  DBili  x   /  AST  20  /  ALT  14  /  AlkPhos  66  03-09    PT/INR - ( 09 Mar 2022 13:29 )   PT: 11.5 sec;   INR: 0.99 ratio         PTT - ( 09 Mar 2022 13:29 )  PTT:36.7 sec      Urinalysis Basic - ( 09 Mar 2022 13:29 )    Color: Light Orange / Appearance: Turbid / S.021 / pH: x  Gluc: x / Ketone: Negative  / Bili: Negative / Urobili: 3 mg/dL   Blood: x / Protein: 100 mg/dL / Nitrite: Negative   Leuk Esterase: Large / RBC: 135 /hpf / WBC 69 /HPF   Sq Epi: x / Non Sq Epi: 6 /hpf / Bacteria: Negative           @ 17:49  3.8  39      Thyroid Stimulating Hormone, Serum: 0.94 uIU/mL ( @ 06:26)          Consultant(s) Notes Reviewed:      Care Discussed with Consultants/Other Providers:    
  afberile    REVIEW OF SYSTEMS:  GEN: no fever,    no chills  RESP: no SOB,   no cough  CVS: no chest pain,   no palpitations  GI: no abdominal pain,   no nausea,   no vomiting,   no constipation,   no diarrhea  : no dysuria,   no frequency  NEURO: no headache,   no dizziness  PSYCH: no depression,   not anxious  Derm : no rash    MEDICATIONS  (STANDING):  cefTRIAXone   IVPB 1000 milliGRAM(s) IV Intermittent every 24 hours  dextrose 40% Gel 15 Gram(s) Oral once  dextrose 5%. 1000 milliLiter(s) (50 mL/Hr) IV Continuous <Continuous>  dextrose 5%. 1000 milliLiter(s) (100 mL/Hr) IV Continuous <Continuous>  dextrose 50% Injectable 25 Gram(s) IV Push once  dextrose 50% Injectable 12.5 Gram(s) IV Push once  dextrose 50% Injectable 25 Gram(s) IV Push once  glucagon  Injectable 1 milliGRAM(s) IntraMuscular once  heparin   Injectable 5000 Unit(s) SubCutaneous every 12 hours  insulin lispro (ADMELOG) corrective regimen sliding scale   SubCutaneous three times a day before meals  potassium chloride    Tablet ER 40 milliEquivalent(s) Oral once  sodium chloride 0.9%. 1000 milliLiter(s) (80 mL/Hr) IV Continuous <Continuous>    MEDICATIONS  (PRN):      Vital Signs Last 24 Hrs  T(C): 36.6 (11 Mar 2022 04:36), Max: 36.8 (10 Mar 2022 20:11)  T(F): 97.9 (11 Mar 2022 04:36), Max: 98.2 (10 Mar 2022 20:11)  HR: 67 (11 Mar 2022 04:36) (65 - 90)  BP: 117/71 (11 Mar 2022 04:36) (110/71 - 121/68)  BP(mean): --  RR: 20 (11 Mar 2022 04:36) (18 - 20)  SpO2: 97% (11 Mar 2022 04:36) (97% - 99%)  CAPILLARY BLOOD GLUCOSE      POCT Blood Glucose.: 126 mg/dL (10 Mar 2022 21:38)  POCT Blood Glucose.: 124 mg/dL (10 Mar 2022 17:15)  POCT Blood Glucose.: 106 mg/dL (10 Mar 2022 12:13)  POCT Blood Glucose.: 106 mg/dL (10 Mar 2022 08:33)    I&O's Summary    10 Mar 2022 07:01  -  11 Mar 2022 07:00  --------------------------------------------------------  IN: 1490 mL / OUT: 2800 mL / NET: -1310 mL        PHYSICAL EXAM:  HEAD:  Atraumatic, Normocephalic  NECK: Supple, No   JVD  CHEST/LUNG:   no     rales,     no,    rhonchi  HEART: Regular rate and rhythm;         murmur  ABDOMEN: Soft, Nontender, ;   EXTREMITIES:   no    edema  NEUROLOGY:  alert    LABS:                        14.9   6.55  )-----------( 143      ( 10 Mar 2022 07:18 )             44.9     03-11    137  |  99  |  16  ----------------------------<  119<H>  3.4<L>   |  24  |  1.21    Ca    8.7      11 Mar 2022 06:58    TPro  6.9  /  Alb  3.9  /  TBili  1.0  /  DBili  x   /  AST  20  /  ALT  14  /  AlkPhos  66  03-09    PT/INR - ( 09 Mar 2022 13:29 )   PT: 11.5 sec;   INR: 0.99 ratio         PTT - ( 09 Mar 2022 13:29 )  PTT:36.7 sec      Urinalysis Basic - ( 09 Mar 2022 13:29 )    Color: Light Orange / Appearance: Turbid / S.021 / pH: x  Gluc: x / Ketone: Negative  / Bili: Negative / Urobili: 3 mg/dL   Blood: x / Protein: 100 mg/dL / Nitrite: Negative   Leuk Esterase: Large / RBC: 135 /hpf / WBC 69 /HPF   Sq Epi: x / Non Sq Epi: 6 /hpf / Bacteria: Negative           @ 17:49  3.8  39      Thyroid Stimulating Hormone, Serum: 0.94 uIU/mL ( @ 06:26)          Consultant(s) Notes Reviewed:      Care Discussed with Consultants/Other Providers:

## 2022-03-13 NOTE — PROGRESS NOTE ADULT - ASSESSMENT
72 yo M     h/o   DM, HLD,  BPH  [facility]  No abdominal pain, no fever, no chest pain, no sob no dysuria.  COVID vaccinated no sick contacts.    card  cindy guevara  and  endo  cindy littlejohn,     CT,  on 3/3/22 bilateral hydro to level of bladder, consistent with bladder outlet obstruction.        *  now  sent to e r by his  pmd.  for   low  sbp and  with   GINA  on iv fuids/  keep berry    * bph  on flomax   *  DM,  known to cindy littlejohn    follow  fs    *  HLD, on asa, lipitor     stop cozaar    * on  dvt ppx  renal  u/s, b/l hydro/  urology dr ballard.  pt  to leave  with  berry  daughter  aware, .  but  states  she is unable to take  care of  pt. and care varghese  is  seeking rehab     per   urology,  d/c  with c/c  berry  daughter  wants rehab       < from: CT Abdomen and Pelvis w/ IV Cont (03.03.22 @ 06:09) >  IMPRESSION:  No evidence of malignancy in the chest, abdomen and pelvis.  Moderate bilateral hydroureteronephrosis to the level of the urinary   bladder, which is distended with urine likely secondary to bladder outlet   obstruction.  --- End of Report ---  < end of copied text >       rad< from: Transthoracic Echocardiogram (02.22.22 @ 06:55) >  Conclusions:  1. Normal left ventricular systolic function. LVEF using  biplane Marroquin's is 71%. No segmental wall motion  abnormalities. There is a sigmoid septum measuring  1.7cm.Normal diastolic function  2. Normal right atrium. Normal right ventricular size and  function.Normal tricuspid valve. Mild tricuspid  regurgitation.Estimated pulmonary artery systolic pressure  equals 28 mm Hg, assuming right atrial pressure equals 3  mm Hg, consistent with normal pulmonary pressures.  3. No pericardial effusion seen.  < end of copied text >         72 yo M     h/o   DM, HLD,  BPH  [facility]  No abdominal pain, no fever, no chest pain, no sob no dysuria.  COVID vaccinated no sick contacts.    card  cindy guevara  and  endo  cindy littlejohn,     CT,  on 3/3/22 bilateral hydro to level of bladder, consistent with bladder outlet obstruction.        *  now  sent to e r by his  pmd.  for   low  sbp and  with   GINA  on iv fuids/  keep berry    * bph  on flomax   *  DM,  known to cindy littlejohn    follow  fs    *  HLD, on asa, lipitor     stop cozaar    * on  dvt ppx  renal  u/s, b/l hydro/  urology dr ballard.  pt  to leave  with  kristi  daughter  aware, .  but  states  she is unable to take  care of  pt. and care varghese  is  seeking rehab     per   urology,  d/c  with c/c  berry  daughter  wants rehab/  per pT, no  needs/    endo has  discussed  with daughter, , regarding  dm rx. no need  for meds,  but   family follows  a different  plan at home   clerked   for   d/c  . with  kristi,  as  per   urology  given  family's inability  to care   fr  pt, re  admission  appea rs likely  care cortn  following   pt       < from: CT Abdomen and Pelvis w/ IV Cont (03.03.22 @ 06:09) >  IMPRESSION:  No evidence of malignancy in the chest, abdomen and pelvis.  Moderate bilateral hydroureteronephrosis to the level of the urinary   bladder, which is distended with urine likely secondary to bladder outlet   obstruction.  --- End of Report ---  < end of copied text >       rad< from: Transthoracic Echocardiogram (02.22.22 @ 06:55) >  Conclusions:  1. Normal left ventricular systolic function. LVEF using  biplane Marroquin's is 71%. No segmental wall motion  abnormalities. There is a sigmoid septum measuring  1.7cm.Normal diastolic function  2. Normal right atrium. Normal right ventricular size and  function.Normal tricuspid valve. Mild tricuspid  regurgitation.Estimated pulmonary artery systolic pressure  equals 28 mm Hg, assuming right atrial pressure equals 3  mm Hg, consistent with normal pulmonary pressures.  3. No pericardial effusion seen.  < end of copied text >         70 yo M     h/o   DM, HLD,  BPH  [facility]  No abdominal pain, no fever, no chest pain, no sob no dysuria.  COVID vaccinated no sick contacts.    card  cindy guevara  and  endo  cindy littlejohn,     CT,  on 3/3/22 bilateral hydro to level of bladder, consistent with bladder outlet obstruction.        *  now  sent to e r by his  pmd.  for   low  sbp and  with   GINA  on iv fuids/  keep berry    * bph  on flomax   *  DM,  known to cindy littlejohn    follow  fs    *  HLD, on asa, lipitor     stop cozaar    * on  dvt ppx  renal  u/s, b/l hydro/  urology dr ballard.  pt  to leave  with  berry  daughter  aware, .  but  states  she is unable to take  care of  pt. and care varghese  is  seeking rehab     per   urology,  d/c  with c/c  berry  daughter  wants rehab/  per pT, no  needs/    endo has  discussed  with daughter, , regarding  dm rx. no need  for meds,  but   family follows  a different  plan at home   clerked   for   d/c  . with  berry,  as  per   urology  given  family's inability  to care   fr  pt, re  admission  appea rs likely  care cortn  following   pt    pmd  dr brittaney rubalcava       < from: CT Abdomen and Pelvis w/ IV Cont (03.03.22 @ 06:09) >  IMPRESSION:  No evidence of malignancy in the chest, abdomen and pelvis.  Moderate bilateral hydroureteronephrosis to the level of the urinary   bladder, which is distended with urine likely secondary to bladder outlet   obstruction.  --- End of Report ---  < end of copied text >       rad< from: Transthoracic Echocardiogram (02.22.22 @ 06:55) >  Conclusions:  1. Normal left ventricular systolic function. LVEF using  biplane Marroquin's is 71%. No segmental wall motion  abnormalities. There is a sigmoid septum measuring  1.7cm.Normal diastolic function  2. Normal right atrium. Normal right ventricular size and  function.Normal tricuspid valve. Mild tricuspid  regurgitation.Estimated pulmonary artery systolic pressure  equals 28 mm Hg, assuming right atrial pressure equals 3  mm Hg, consistent with normal pulmonary pressures.  3. No pericardial effusion seen.  < end of copied text >

## 2022-03-13 NOTE — CONSULT NOTE ADULT - SUBJECTIVE AND OBJECTIVE BOX
HPI:  : 71yr M     h/o   DM ,  bph , had a  home foely,     w recent ED visit for hematuria      sent today from office for persistent hypotension.   denies  fever chills/  cp, sob, abd pain, no diarrhea, no vomiting,   non smoker, no etoh (09 Mar 2022 15:19)    Daughter at bedside helped with Endocrine history.  Patient known to Service from recent hospital admissions, has recently diagnosed diabetes, A1C 10.6% (improved from 11.5% on 2/21), on oral meds and insulin at home, weight loss, Endo was consulted for glycemic control.    PAST MEDICAL & SURGICAL HISTORY:  Diabetes        FAMILY HISTORY:      Social History:    Outpatient Medications:    MEDICATIONS  (STANDING):  aspirin enteric coated 81 milliGRAM(s) Oral daily  atorvastatin 20 milliGRAM(s) Oral at bedtime  dextrose 40% Gel 15 Gram(s) Oral once  dextrose 5%. 1000 milliLiter(s) (50 mL/Hr) IV Continuous <Continuous>  dextrose 5%. 1000 milliLiter(s) (100 mL/Hr) IV Continuous <Continuous>  dextrose 50% Injectable 25 Gram(s) IV Push once  dextrose 50% Injectable 12.5 Gram(s) IV Push once  dextrose 50% Injectable 25 Gram(s) IV Push once  glucagon  Injectable 1 milliGRAM(s) IntraMuscular once  heparin   Injectable 5000 Unit(s) SubCutaneous every 12 hours  insulin lispro (ADMELOG) corrective regimen sliding scale   SubCutaneous three times a day before meals  tamsulosin 0.4 milliGRAM(s) Oral at bedtime    MEDICATIONS  (PRN):      Allergies    No Known Allergies    Intolerances      Review of Systems:  Constitutional: No fever, no chills  Eyes: No blurry vision  Neuro: No tremors  HEENT: No pain, no neck swelling  Cardiovascular: No chest pain, no palpitations  Respiratory: Has SOB, no cough  GI: No nausea, vomiting, abdominal pain  : No dysuria  Skin: no rash  MSK: Has leg swelling.  Psych: no depression  Endocrine: no polyuria, polydipsia    ALL OTHER SYSTEMS REVIEWED AND NEGATIVE    UNABLE TO OBTAIN    PHYSICAL EXAM:  VITALS: T(C): 36.8 (03-13-22 @ 05:41)  T(F): 98.2 (03-13-22 @ 05:41), Max: 98.2 (03-13-22 @ 05:41)  HR: 66 (03-13-22 @ 05:41) (66 - 69)  BP: 112/72 (03-13-22 @ 05:41) (106/66 - 113/72)  RR:  (18 - 18)  SpO2:  (96% - 98%)  Wt(kg): --  GENERAL: NAD, well-groomed, well-developed  EYES: No proptosis, no lid lag  HEENT:  Atraumatic, Normocephalic  THYROID: Normal size, no palpable nodules  RESPIRATORY: Clear to auscultation bilaterally; No rales, rhonchi, wheezing  CARDIOVASCULAR: Si S2, No murmurs;  GI: Soft, non distended, normal bowel sounds  SKIN: Dry, intact, No rashes or lesions  MUSCULOSKELETAL: Has BL lower extremity edema.  NEURO:  no tremor, sensation decreased in feet BL,    POCT Blood Glucose.: 159 mg/dL (03-13-22 @ 12:15)  POCT Blood Glucose.: 121 mg/dL (03-13-22 @ 08:18)  POCT Blood Glucose.: 114 mg/dL (03-12-22 @ 22:39)  POCT Blood Glucose.: 156 mg/dL (03-12-22 @ 17:09)  POCT Blood Glucose.: 136 mg/dL (03-12-22 @ 11:43)  POCT Blood Glucose.: 127 mg/dL (03-12-22 @ 08:00)  POCT Blood Glucose.: 128 mg/dL (03-11-22 @ 22:24)  POCT Blood Glucose.: 136 mg/dL (03-11-22 @ 17:18)  POCT Blood Glucose.: 112 mg/dL (03-11-22 @ 11:44)  POCT Blood Glucose.: 101 mg/dL (03-11-22 @ 08:30)  POCT Blood Glucose.: 126 mg/dL (03-10-22 @ 21:38)  POCT Blood Glucose.: 124 mg/dL (03-10-22 @ 17:15)          03-11    137  |  99  |  16  ----------------------------<  119<H>  3.4<L>   |  24  |  1.21    EGFR if : x   EGFR if non : x     Ca    8.7      03-11        Thyroid Function Tests:  03-03 @ 06:26 TSH 0.94 FreeT4 1.2 T3 -- Anti TPO -- Anti Thyroglobulin Ab -- TSI --  02-21 @ 09:06 TSH 0.76 FreeT4 -- T3 -- Anti TPO -- Anti Thyroglobulin Ab -- TSI --          02-22 Chol 196 Direct LDL -- LDL calculated 111<H> HDL 69 Trig 84, 02-21 Chol 217<H> Direct LDL -- LDL calculated 123<H> HDL 76 Trig 92    Radiology:                    HPI:  : 71yr M     h/o   DM ,  bph , had a  home foely,     w recent ED visit for hematuria      sent today from office for persistent hypotension.   denies  fever chills/  cp, sob, abd pain, no diarrhea, no vomiting,   non smoker, no etoh (09 Mar 2022 15:19)    Daughter at bedside helped with Endocrine history.  Patient known to Service from recent hospital admissions, has recently diagnosed diabetes, A1C 10.6% (improved from 11.5% on 2/21), on oral meds and insulin at home, weight loss, Endo was consulted for glycemic control.    PAST MEDICAL & SURGICAL HISTORY:  Diabetes        FAMILY HISTORY:      Social History:    Outpatient Medications:    MEDICATIONS  (STANDING):  aspirin enteric coated 81 milliGRAM(s) Oral daily  atorvastatin 20 milliGRAM(s) Oral at bedtime  dextrose 40% Gel 15 Gram(s) Oral once  dextrose 5%. 1000 milliLiter(s) (50 mL/Hr) IV Continuous <Continuous>  dextrose 5%. 1000 milliLiter(s) (100 mL/Hr) IV Continuous <Continuous>  dextrose 50% Injectable 25 Gram(s) IV Push once  dextrose 50% Injectable 12.5 Gram(s) IV Push once  dextrose 50% Injectable 25 Gram(s) IV Push once  glucagon  Injectable 1 milliGRAM(s) IntraMuscular once  heparin   Injectable 5000 Unit(s) SubCutaneous every 12 hours  insulin lispro (ADMELOG) corrective regimen sliding scale   SubCutaneous three times a day before meals  tamsulosin 0.4 milliGRAM(s) Oral at bedtime    MEDICATIONS  (PRN):      Allergies    No Known  Allergies    Intolerances      Review of Systems:  Constitutional: No fever, no chills  Eyes: No blurry vision  Neuro: No tremors  HEENT: No pain, no neck swelling  Cardiovascular: No chest pain, no palpitations  Respiratory: Has SOB, no cough  GI: No nausea, vomiting, abdominal pain  : No dysuria  Skin: no rash  MSK: Has leg swelling.  Psych: no depression  Endocrine: no polyuria, polydipsia    ALL OTHER SYSTEMS REVIEWED AND NEGATIVE    UNABLE TO OBTAIN    PHYSICAL EXAM:  VITALS: T(C): 36.8 (03-13-22 @ 05:41)  T(F): 98.2 (03-13-22 @ 05:41), Max: 98.2 (03-13-22 @ 05:41)  HR: 66 (03-13-22 @ 05:41) (66 - 69)  BP: 112/72 (03-13-22 @ 05:41) (106/66 - 113/72)  RR:  (18 - 18)  SpO2:  (96% - 98%)  Wt(kg): --  GENERAL: NAD, well-groomed, well-developed  EYES: No proptosis, no lid lag  HEENT:  Atraumatic, Normocephalic  THYROID: Normal size, no palpable nodules  RESPIRATORY: Clear to auscultation bilaterally; No rales, rhonchi, wheezing  CARDIOVASCULAR: Si S2, No murmurs;  GI: Soft, non distended, normal bowel sounds  SKIN: Dry, intact, No rashes or lesions  MUSCULOSKELETAL: Has BL lower extremity edema.  NEURO:  no tremor, sensation decreased in feet BL,    POCT Blood Glucose.: 159 mg/dL (03-13-22 @ 12:15)  POCT Blood Glucose.: 121 mg/dL (03-13-22 @ 08:18)  POCT Blood Glucose.: 114 mg/dL (03-12-22 @ 22:39)  POCT Blood Glucose.: 156 mg/dL (03-12-22 @ 17:09)  POCT Blood Glucose.: 136 mg/dL (03-12-22 @ 11:43)  POCT Blood Glucose.: 127 mg/dL (03-12-22 @ 08:00)  POCT Blood Glucose.: 128 mg/dL (03-11-22 @ 22:24)  POCT Blood Glucose.: 136 mg/dL (03-11-22 @ 17:18)  POCT Blood Glucose.: 112 mg/dL (03-11-22 @ 11:44)  POCT Blood Glucose.: 101 mg/dL (03-11-22 @ 08:30)  POCT Blood Glucose.: 126 mg/dL (03-10-22 @ 21:38)  POCT Blood Glucose.: 124 mg/dL (03-10-22 @ 17:15)          03-11    137  |  99  |  16  ----------------------------<  119<H>  3.4<L>   |  24  |  1.21    EGFR if : x   EGFR if non : x     Ca    8.7      03-11        Thyroid Function Tests:  03-03 @ 06:26 TSH 0.94 FreeT4 1.2 T3 -- Anti TPO -- Anti Thyroglobulin Ab -- TSI --  02-21 @ 09:06 TSH 0.76 FreeT4 -- T3 -- Anti TPO -- Anti Thyroglobulin Ab -- TSI --          02-22 Chol 196 Direct LDL -- LDL calculated 111<H> HDL 69 Trig 84, 02-21 Chol 217<H> Direct LDL -- LDL calculated 123<H> HDL 76 Trig 92    Radiology:

## 2022-03-13 NOTE — CONSULT NOTE ADULT - ASSESSMENT
Assessment  DMT2: 71y Male with DM T2 with hyperglycemia, A1C 10.6%, was on oral meds and insulin at home, patient has recent weight loss and low insulin requirement, blood sugars trending within overall acceptable range on insulin coverage only, no hypoglycemic episodes, NAD, daughter at bedside.  Hydro/Obstruction: on medications, stable, monitored, FU urology.  HLD: On statin.        Nini Carpenter MD  Cell: [phone]  Office: [phone]         Assessment  DMT2: 71y Male with DM T2 with hyperglycemia, A1C 10.6%, was on oral meds and insulin at home, patient has recent weight  loss and low insulin requirement, blood sugars trending within overall acceptable range on insulin coverage only, no hypoglycemic episodes, NAD, daughter at bedside.  Hydro/Obstruction: on medications, stable, monitored, FU urology.  HLD: On statin.        Nini Carpenter MD  Cell: [phone]  Office: [phone]

## 2022-03-14 LAB
CULTURE RESULTS: SIGNIFICANT CHANGE UP
CULTURE RESULTS: SIGNIFICANT CHANGE UP
SPECIMEN SOURCE: SIGNIFICANT CHANGE UP
SPECIMEN SOURCE: SIGNIFICANT CHANGE UP

## 2022-05-02 PROBLEM — Z00.00 ENCOUNTER FOR PREVENTIVE HEALTH EXAMINATION: Status: ACTIVE | Noted: 2022-05-02

## 2022-07-25 NOTE — PATIENT PROFILE ADULT - NSPROPOAURINARYCATHETER_GEN_A_NUR
2022     RE: Lizzette Chanel  [address]  [facility] [address]     Dear Colleague,    Thank you for referring your patient, Lizzette Chanel, to the [facility] NEUROS[facility] at [facility]. Please see a copy of my visit note below.    Patient was seen for neuropsychological evaluation at the request of Dr. Mohinder Lyles, for the purposes of diagnostic clarification and treatment planning.  3 hrs 11 min of test administration and scoring were provided by this writer, Deirdre Gustafson.  Please see Dr. Franck Mccauley's report for a full interpretation of the findings.      Adult Neuropsychology Clinic  [facility]      NEUROPSYCHOLOGICAL EVALUATION    RELEVANT HISTORY AND REASON FOR REFERRAL    This is a report of neuropsychological consultation regarding Lizzette Chanel (Judy), an 82-year-old, right-handed woman with 16 years of formal education. She presents with concerns about slowly progressive memory problems over the last couple of years, such as misplacing items, being forgetful, and getting lost. She saw Dr. Mohinder Lyles for a neurological evaluation in  and had trouble on memory items on bedside cognitive screening. There have been increasing issues with fatigue and weakness, and her mood has been lower than usual. She had polio in high school and has had persistent left-sided weakness since then. She was diagnosed with post-polio syndrome in 2018, and it is currently seen as stable. A right frontal meningioma was discovered on brain MRI in , along with mild small vessel ischemic disease and mild-to-moderate parenchymal volume loss. She lives alone and remains independent for all ADLs. Family history includes Alzheimer s disease and small strokes for her mother, who  at 93 in skilled nursing care. Her father and brother had heart disease and  at 63 and 49, respectively. Additional medical concerns include hypertension,  hyperlipidemia, obstructive sleep apnea, cervical spinal stenosis, neuropathy, and osteoporosis. Her current medication list includes alendronate, apoaequorin, 81 mg aspirin, calcium carbonate-vitamin D, coenzyme Q10, hydrochlorothiazide, losartan, rosuvastatin, and vitamin B-12.     In today s interview, Ms. Chanel confirms that her primary concerns are short-term memory lapses that have gradually worsened over the last couple of years. She is here with her longtime friend, Fabiola, who describes the short-term memory troubles as very prominent. She says that Ms. Chanel catches herself and notices the lapses, provoking frustration. She was doing things like losing track of her mobile phone, but it has been helpful to keep it in a fixed location at home. She has been lost while driving a few times, and Fabiola is a little concerned about driving safety. Ms. Chanel has not felt any particular need to change or limit her driving habits. She manages her medications independently and reports no problems. She manages her finances independently and reports no problems. She has her own home, and she lives alone with her dog. She also has a cabin [place] and keeps it up on her own.     She has never been  and does not have children. She does not have any family members in the area. She does have many friends, and she tries to see them as often as possible. The COVID-19 pandemic has substantially reduced her socialization frequency and quality.    She is experiencing more challenges with her mood than is typical for her. Living with her dog helps, and she still enjoys time with her friends. She went to see a counselor about 2 or 3 years ago and found it helpful, but for most of her life she has enjoyed exceptional emotional adjustment. She has never taken psychiatric medications or had a psychiatric hospitalization. She reports some fleeting thoughts of suicide within the last year, which she actively talks  herself away from. She denies any lasting desires, preparations, or ever taking any actions to harm herself. She has not had any hallucinatory experiences. I get no indications in the report today of fundamental shifts in personality, temperament, or management of social behaviors. She is described as socially slowed down and less engaged than usual, seemingly for a mix of lower mood, chronic fatigue, and the pandemic.    She has treated her sleep apnea with CPAP for many years. Her sleep is usually good overnight. Records indicate a history of insomnia, but she reports no such concerns currently. There have been no indications of REM sleep behaviors. She is chronically exhausted during the day. She would prefer to take a nap but cannot always do so. She says there have been some instances of unintentional sleeping during the day. Fabiola notes that Ms. Chanel used to have really great levels of energy, but in the last year she has frequently talked about how exhausted she is. She is bothered by declining physical abilities, particularly with left leg troubles, and the lack of diagnostic clarity around it.     As noted, a possible right frontal meningioma was just discovered on neuroimaging studies last month. It was described as an extra-axial mass of dural origin having a mild degree of regional mass-effect. There is no history of stroke, seizure, TBI, or migraine. She reports no abnormal changes to her senses of smell, taste, or vision. She had her hearing tested and plans to get hearing aids but has not yet obtained them. She notices abnormal sensation in her fingers and feet. She has been more careful than usual when walking, to keep her balance. She does not use any sort of assistive device, and she reports no falls.    She has not had a COVID-19 infection.    She reports minimal alcohol use and no history of problematic drinking. She used to use tobacco but quit in 1974. She does not use any illicit  drugs.    There is no history of early academic delays. She was always a very good student. She was driven to achieve and also had a high level of involvement in extracurricular activities. Her polio infection came when she was in high school. After high school, she went on to earn a bachelor s degree in physical therapy.    Her career was in physical therapy. 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 She retired at 64.    BEHAVIORAL OBSERVATIONS    Ms. Chanel was polite and cooperative with the evaluation. She did appear tired, but she was not somnolent. A high-frequency, low amplitude head and neck tremor were visible throughout the visit. She was open and candid in the interview, and she demonstrated appropriate levels of insight into the referral concerns. She was highly conversant and did not show indications of gross aphasia. Thought processes were linear and goal-directed. Affective display was congruent with mood and conversational content. During the testing session, she was notably slow in her approach to tasks that did not have set time limits. She was alert and attentive. Her test engagement was good, and she persisted well with all requested procedures. The data are seen as valid estimations of her cognitive capacity.     MEASURES ADMINISTERED    The following measures were administered by a trained psychometrist, under my supervision:    Orientation: Time, Place, Basic Personal Information, Recent US Presidents; Wide Range Achievement Test 5: Word Reading; Wechsler Adult Intelligence Scale-IV: Similarities, Matrix Reasoning, Digit Span, Coding; Controlled Oral Word Association Test; Animal Naming Test; Cordova Naming Test; Marlo Visual Acuity Screen; Clock Drawing; Finger Tapping; Trail Making Test; Stroop Test; [place] Card Sorting Test; Emiliano-Osterrieth Complex Figure Test; Emiliano Auditory  Verbal Learning Test; Wechsler Memory Scale-IV: Logical Memory; Geriatric Depression Scale.    RESULTS AND INTERPRETATION    Orientation: Orientation was normal for time and basic personal information. She was not able to demonstrate orientation to place. She was able to name 2 of the 6 most recent [place] presidents.     Language & Related Skills: Basic reading and pronunciation skills were average. Abstract verbal analogical reasoning was average. Letter-based verbal fluency was average for total correct items but notable for 6 errors. Category-based verbal fluency was below average. Confrontation naming was below average.    Visual Perceptual & Constructional Skills: Binocular, corrected, near-point visual acuity was 20/20 on Marlo screening. Visual reasoning through pattern identification was high average. Clock drawing was normal. Copying a complex geometric figure was average for total accuracy but very slow to complete.     Motor Skills: Speeded finger tapping was average with the dominant right hand and exceptionally low with the nondominant left hand.     Mental Speed & Executive Functioning: As noted above, speeded verbal fluency performances were mildly abnormal. Cognitive processing speed was average on a timed transcription task. Visual scanning and graphomotor sequencing under simple conditions was average. Scanning and sequencing under greater executive demands to control divided attention was average. Speeded word reading was low average and speeded color naming was average. Color naming under demands to inhibit reflexive responding was average. Conceptualization, inductive reasoning, and using continuous feedback to overcome errors were average on an ambiguous card-sorting task.     Attention & Working Memory: Immediate auditory attention and working memory were average for repeating and rearranging digit strings.     Learning & Anterograde Memory: Immediate verbal memory for short stories was low  average, and delayed story recall was below average. Delayed recognition of story details was low average. Learning a word list over repeated readings was low average. Delayed free recall of the list was exceptionally low, and delayed recognition of the list was low average. A few minutes after the initial copy, incidental free recall of the complex figure was low average. After 30 minutes, recall of the figure was below average, and recognition of individual figure elements was exceptionally low.     Emotional Functioning: On a brief self-report inventory, she endorsed minimal to borderline symptoms related to depression and anxiety (GDS = 8/30).     IMPRESSIONS    The neuropsychological results are mildly abnormal. Cognitive testing shows a deficit in naming/lexical retrieval and anterograde memory formation, and some aspects of orientation are lower than expected. There is also significant fine-motor slowing for the left hand. She can be very slow to complete tasks when they are devoid of time limits, but on those tasks with explicit instructions to go as quickly as possible, she does well. The remainder of today s performances are within normal ranges.     The concerns about low mood and chronic fatigue are common in post-polio syndrome. I do not suspect a primary psychiatric disorder.     She reports that left-sided weakness and motor trouble has been present since having polio in high school. There could be an additional contribution from the right frontal meningioma recently seen on imaging. However, the right frontal meningioma would not be expected to produce the cognitive issues observed today. Those would involve the medial temporal lobes, basal forebrain, and left anterior temporal regions. The neuropsychological profile looks like the early effects of Alzheimer s disease with a superimposed focal motor problem. Ms. Chanel is not in a state of dementia at this time. She retains many significant cognitive  strengths and is apparently living alone without serious problems. However, there could be problems she is not noticing or has forgotten about. Nonetheless, a diagnosis of mild cognitive impairment (MCI) is appropriate at this time.     RECOMMENDATIONS    I met with Ms. Chanel and her friend, Fabiola, to discuss preliminary results and recommendations after the testing session.     1. Continued neurological care and monitoring are needed. I defer to Dr. Lyles on any additional workups or treatment options.   2. She should continue to work with her medical team on pharmacological treatments for fatigue and low mood.   3. She should endeavor to stay physically active, while minimizing fall risk.   4. Caution with driving would be appropriate, but I do not have evidence to suggest driving needs to stop.   5. Her memory deficit puts her at risk for errors in managing medications and finances. She does not have family in the area. It would be ideal to have trusted friends, or possibly hired professionals, providing occasional checks on these matters. An Occupational Therapy referral is warranted, to get objective data on risks for ADL management.   6. I encourage making a connection to the Alzheimer s Association (www.alz.org). They are a great resource for education, guidance, and support in planning for the years to come.   7. Longitudinal monitoring of cognition is advised. I would like to see her again in about 12 months, to check stability versus change in her cognitive abilities.     Franck Mccauley, PhD, LP, ABPP-CN  Board Certified in Clinical Neuropsychology  Licensed Psychologist [id number]      Time spent: One unit psychiatric evaluation including records review, interview, and clinical assessment licensed and board-certified neuropsychologist (CPT 03570). 132 minutes neuropsychological testing evaluation by licensed and board-certified neuropsychologist, including integration of patient data, interpretation  of standardized test results and clinical data, clinical decision-making, treatment planning, report, and interactive feedback to the patient (CPT 81550, [id number]). 191 minutes of psychological and neuropsychological test administration and scoring by technician (CPT 71050, 88886). Diagnoses: G31.84, G14, D32.0     no

## 2022-08-05 NOTE — ED ADULT TRIAGE NOTE - TEMPERATURE IN CELSIUS (DEGREES C)
What Type Of Note Output Would You Prefer (Optional)?: Standard Output How Severe Are Your Spot(S)?: mild Have Your Spot(S) Been Treated In The Past?: has not been treated Hpi Title: Evaluation of Skin Lesions 36.7

## 2023-03-07 NOTE — CHART NOTE - NSCHARTNOTEFT_GEN_A_CORE
Left  message for patient in regards to follow up care with callback information.
Left 1 message for the patient in regards to follow up care with callback information.
MEDICINE NP    SILVIO BRYANT  71y Male    Patient is a 71y old  Male who presents with a chief complaint of weakness (23 Feb 2022 14:15)         > Event Summary:   Notified by RN, Patient with BG 87, due for Lantus 18Units @HS; reports poor intake of dinner.  Patient seen at bedside, denies lightheadedness, shakiness, sweating, hunger.  Patient now s/p snack meal with repeat   -Will give Lantus 14Units and evaluate  -F/u Endocrine in AM  -Will endorse to Day Provider in AM and Attending to follow        CAPILLARY BLOOD GLUCOSE  POCT Blood Glucose.: 128 mg/dL (24 Feb 2022 00:26)  POCT Blood Glucose.: 87 mg/dL (23 Feb 2022 22:18)  POCT Blood Glucose.: 103 mg/dL (23 Feb 2022 16:41)  POCT Blood Glucose.: 106 mg/dL (23 Feb 2022 11:34)  POCT Blood Glucose.: 218 mg/dL (23 Feb 2022 07:57)      EBONY Galdamez-BC  Medicine Department  #[id number]
Patient requested call back on 02/26/2022.
Left 1 message for the patient in regards to follow up care with callback information.
SILVIO BRYANT    Notified by RN patient with SBP >160's, no c/o cp or sob      Interventions taken       Additional Losartan 25mg po x1 to keep SBP <160  cont assess and monitor  f/u with am team.                  Kishor Garcias ANP-BC  Spectralink #[id number]
X Size Of Lesion In Cm (Optional): 0.4

## 2023-03-15 NOTE — ED PROVIDER NOTE - NS ED MD EM SELECTION
Please schedule more treatment for the patient, same time and day schedule  Please schedule follow up with Dr Taisha Felder prior to appointment on 04/26/2023, at [facility] location  [id number] Critical Care - 30 to 74 minutes

## 2023-06-21 NOTE — DISCHARGE NOTE PROVIDER - NSDCHHHOMEBOUND_GEN_ALL_CORE
Other, specify...
decreased ability to use arms for pushing/pulling/decreased ability to use legs for bridging/pushing/impaired ability to control trunk for mobility

## 2024-01-23 NOTE — PATIENT PROFILE ADULT - HAVE YOU HAD A FIRST COVID-19 BOOSTER?
Be mindful of diet.      Exercise regularly and stay hydrated.      Continue NovoLog at current dose.     Continue morning Lantus 44 units and 32 units in the evening.     When traveling and eating larger dinners, please utilize a 1:6 insulin to carbohydrate ratio.     Make sure you are injecting insulin consistently.     Continue to utilize the freestyle jeremy.     Contact the office with any consistent hypoglycemia.     Continue lisinopril.     Continue atorvastatin.     Obtain lab work prior to next visit.              
No

## 2024-03-07 NOTE — PHYSICAL THERAPY INITIAL EVALUATION ADULT - LEVEL OF INDEPENDENCE: STAIR NEGOTIATION, REHAB EVAL
Date of service: 03-07-24 @ 19:26      Patient is a 37y old  Male who presents with a chief complaint of fall (07 Mar 2024 10:23)                                                               INTERVAL HPI/OVERNIGHT EVENTS:    REVIEW OF SYSTEMS:    in pacu   recovering from ansthesia                                                                                                                                                                                                                                                                                    Medications:  MEDICATIONS  (STANDING):  acetaminophen     Tablet .. 975 milliGRAM(s) Oral every 8 hours  aspirin enteric coated 81 milliGRAM(s) Oral two times a day  ceFAZolin   IVPB 1000 milliGRAM(s) IV Intermittent every 12 hours  cinacalcet 30 milliGRAM(s) Oral daily  hydrALAZINE 10 milliGRAM(s) Oral every 12 hours  metoprolol tartrate 25 milliGRAM(s) Oral two times a day  polyethylene glycol 3350 17 Gram(s) Oral daily  senna 2 Tablet(s) Oral at bedtime  sevelamer carbonate 1600 milliGRAM(s) Oral three times a day    MEDICATIONS  (PRN):  ondansetron Injectable 4 milliGRAM(s) IV Push every 6 hours PRN Nausea and/or Vomiting  oxyCODONE    IR 15 milliGRAM(s) Oral every 4 hours PRN Severe Pain (7 - 10)  oxyCODONE    IR 10 milliGRAM(s) Oral every 4 hours PRN Moderate Pain (4 - 6)       Allergies    No Known Drug Allergies  shellfish (Hives)    Intolerances      Vital Signs Last 24 Hrs  T(C): 37.1 (07 Mar 2024 18:30), Max: 37.5 (07 Mar 2024 05:05)  T(F): 98.7 (07 Mar 2024 18:30), Max: 99.5 (07 Mar 2024 05:05)  HR: 88 (07 Mar 2024 18:30) (76 - 100)  BP: 148/84 (07 Mar 2024 18:30) (129/89 - 166/84)  BP(mean): 96 (07 Mar 2024 14:30) (96 - 116)  RR: 18 (07 Mar 2024 18:30) (15 - 18)  SpO2: 100% (07 Mar 2024 18:30) (96% - 100%)    Parameters below as of 07 Mar 2024 18:30  Patient On (Oxygen Delivery Method): room air      CAPILLARY BLOOD GLUCOSE      POCT Blood Glucose.: 111 mg/dL (07 Mar 2024 18:00)  POCT Blood Glucose.: 128 mg/dL (07 Mar 2024 13:46)  POCT Blood Glucose.: 125 mg/dL (07 Mar 2024 12:29)  POCT Blood Glucose.: 174 mg/dL (07 Mar 2024 11:43)  POCT Blood Glucose.: 91 mg/dL (07 Mar 2024 11:21)  POCT Blood Glucose.: 138 mg/dL (07 Mar 2024 10:57)      03-07 @ 07:01  -  03-07 @ 19:26  --------------------------------------------------------  IN: 240 mL / OUT: 0 mL / NET: 240 mL      Physical Exam:    Daily Height in cm: 187.96 (07 Mar 2024 10:07)    Daily   General:  Well appearing, NAD, not cachetic  HEENT:  Nonicteric, PERRLA  CV:  RRR, S1S2   Lungs:  CTA B/L, no wheezes, rales, rhonchi  Abdomen:  Soft, non-tender, no distended, positive BS  Extremities: no e4dema                                                                                                                                                                                                                                                                                     LABS:                               8.3    8.63  )-----------( 221      ( 07 Mar 2024 12:06 )             26.1                      03-07    136  |  95<L>  |  74<H>  ----------------------------<  149<H>  5.3   |  25  |  8.39<H>    Ca    8.7      07 Mar 2024 12:06    TPro  6.9  /  Alb  3.6  /  TBili  0.4  /  DBili  x   /  AST  10  /  ALT  <5<L>  /  AlkPhos  1052<H>  03-06                       RADIOLOGY & ADDITIONAL TESTS         I personally reviewed: [  ]EKG   [  ]CXR    [  ] CT      A/P:         Discussed with :     Jen consultants' Notes   Time spent :   pt demonstrates appropriate strength to negotiate steps

## 2024-08-22 NOTE — DISCHARGE NOTE NURSING/CASE MANAGEMENT/SOCIAL WORK - CASE MANAGER'S NAME
Problem: Discharge Planning  Goal: Discharge to home or other facility with appropriate resources  Outcome: Progressing  Flowsheets (Taken 8/21/2024 2039)  Discharge to home or other facility with appropriate resources: Identify barriers to discharge with patient and caregiver     Problem: Safety - Adult  Goal: Free from fall injury  Outcome: Progressing  Flowsheets (Taken 8/21/2024 2145)  Free From Fall Injury: Instruct family/caregiver on patient safety     Problem: ABCDS Injury Assessment  Goal: Absence of physical injury  Outcome: Progressing  Flowsheets (Taken 8/21/2024 2145)  Absence of Physical Injury: Implement safety measures based on patient assessment     Problem: Chronic Conditions and Co-morbidities  Goal: Patient's chronic conditions and co-morbidity symptoms are monitored and maintained or improved  Outcome: Progressing  Flowsheets (Taken 8/21/2024 2039)  Care Plan - Patient's Chronic Conditions and Co-Morbidity Symptoms are Monitored and Maintained or Improved: Monitor and assess patient's chronic conditions and comorbid symptoms for stability, deterioration, or improvement      Elham Warren R.N [phone]

## 2025-01-15 NOTE — ED ADULT TRIAGE NOTE - IDEAL BODY WEIGHT(KG)
65 Patient attended session 12 of outpatient Phase 2 cardiac rehab. See scanned in exercise session report in media tab.

## 2025-01-21 NOTE — ED PROVIDER NOTE - CLINICAL SUMMARY MEDICAL DECISION MAKING FREE TEXT BOX
[age]yr M hx of DM on ins seen yesterday for u retention and hematuria and have a berry placed. sent home on kelfex, today returned from doctor office for repeated hypotensive episodes. exam as above  concern for UTI vs pyelo, vs occult infections viral or bacterial. plan for fluids, labs, CXR and cultures, and cefepime.   will need to be admitted.   has GINA w significant worsening, will check berry for patency.